# Patient Record
Sex: MALE | Race: WHITE | NOT HISPANIC OR LATINO | Employment: OTHER | URBAN - METROPOLITAN AREA
[De-identification: names, ages, dates, MRNs, and addresses within clinical notes are randomized per-mention and may not be internally consistent; named-entity substitution may affect disease eponyms.]

---

## 2018-01-22 RX ORDER — DEXTROSE 4 G
1 TABLET,CHEWABLE ORAL DAILY
Qty: 1 EACH | Refills: 0 | OUTPATIENT
Start: 2018-01-22 | End: 2019-01-22

## 2018-01-22 NOTE — TELEPHONE ENCOUNTER
----- Message from Kassy Doty sent at 1/22/2018 11:02 AM CST -----  Contact: spouse   Patient would like to request Rx for a diabetic machine and supplies. Please call back  If completed or can not issue rx at [phone].    PT USES:  [facility] [address] [place] [id number]  Phone: [phone] Fax: [phone]      Thanks,  Kassy Doty

## 2018-01-26 ENCOUNTER — OFFICE VISIT (OUTPATIENT)
Dept: INTERNAL MEDICINE | Facility: CLINIC | Age: 78
End: 2018-01-26
Payer: MEDICARE

## 2018-01-26 VITALS
OXYGEN SATURATION: 97 % | SYSTOLIC BLOOD PRESSURE: 140 MMHG | BODY MASS INDEX: 31.54 KG/M2 | RESPIRATION RATE: 16 BRPM | WEIGHT: 225.31 LBS | HEIGHT: 71 IN | TEMPERATURE: 96 F | HEART RATE: 72 BPM | DIASTOLIC BLOOD PRESSURE: 80 MMHG

## 2018-01-26 DIAGNOSIS — E11.59 HYPERTENSION ASSOCIATED WITH DIABETES: ICD-10-CM

## 2018-01-26 DIAGNOSIS — I15.2 HYPERTENSION ASSOCIATED WITH DIABETES: ICD-10-CM

## 2018-01-26 DIAGNOSIS — E78.5 HYPERLIPIDEMIA ASSOCIATED WITH TYPE 2 DIABETES MELLITUS: ICD-10-CM

## 2018-01-26 DIAGNOSIS — E11.69 HYPERLIPIDEMIA ASSOCIATED WITH TYPE 2 DIABETES MELLITUS: ICD-10-CM

## 2018-01-26 DIAGNOSIS — E11.9 DIABETES MELLITUS WITH NO COMPLICATION: Primary | ICD-10-CM

## 2018-01-26 DIAGNOSIS — N18.30 CKD (CHRONIC KIDNEY DISEASE), STAGE III: ICD-10-CM

## 2018-01-26 PROCEDURE — 99213 OFFICE O/P EST LOW 20 MIN: CPT | Mod: S$GLB,,, | Performed by: INTERNAL MEDICINE

## 2018-01-26 PROCEDURE — 99999 PR PBB SHADOW E&M-EST. PATIENT-LVL III: CPT | Mod: PBBFAC,,, | Performed by: INTERNAL MEDICINE

## 2018-01-26 NOTE — PROGRESS NOTES
"HPI:  Patient is a 77-year-old man who I occasionally intermittently see for acute care issues who comes in today for "physical."  [facility] and he tends to continue getting [facility].  He just wanted to make sure the relationship with me is time to be seen or acute care issues.  I told him that his perfectly fine with me.  I also offered that anytime that he wants to make me.  His primary doctor for all of his healthcare issues.  I would be glad to do that.  I told him that having to record faxed really doesn't work very well and if he wants to get [facility] care through the [facility], he should continue to do that.  Patient at this time is doing fine and has no acute care issues.    Current meds have been verified and updated per the EMR  Exam:BP (!) 140/80   Pulse 72   Temp 96.2 °F (35.7 °C)   Resp 16   Ht 5' 11" (1.803 m)   Wt 102.2 kg (225 lb 5 oz)   SpO2 97%   BMI 31.42 kg/m²   Exam deferred        Impression:  Multiple medical problems below, unknown status as he gets [facility]  Patient Active Problem List   Diagnosis    Diabetes mellitus with no complication    Hypertension associated with diabetes    Hyperlipidemia associated with type 2 diabetes mellitus    CKD (chronic kidney disease), stage III    GERD (gastroesophageal reflux disease)    Carotid stenosis       Plan:     Patient will see me on an as-needed basis.    "

## 2018-03-02 ENCOUNTER — PES CALL (OUTPATIENT)
Dept: ADMINISTRATIVE | Facility: CLINIC | Age: 78
End: 2018-03-02

## 2018-08-15 ENCOUNTER — TELEPHONE (OUTPATIENT)
Dept: INTERNAL MEDICINE | Facility: CLINIC | Age: 78
End: 2018-08-15

## 2018-08-15 NOTE — TELEPHONE ENCOUNTER
----- Message from Zenia Roman sent at 8/15/2018  9:39 AM CDT -----  Contact: pt  States he would like to see if Dr Uribe would write a prescription for the Lybra for testing his blood sugar. Please call pt at [phone]. Thank you

## 2019-04-12 ENCOUNTER — HOSPITAL ENCOUNTER (OUTPATIENT)
Dept: RADIOLOGY | Facility: HOSPITAL | Age: 79
Discharge: HOME | End: 2019-04-12
Attending: INTERNAL MEDICINE
Payer: MEDICARE

## 2019-04-12 ENCOUNTER — OFFICE VISIT (OUTPATIENT)
Dept: INTERNAL MEDICINE | Facility: CLINIC | Age: 79
End: 2019-04-12
Payer: MEDICARE

## 2019-04-12 VITALS
BODY MASS INDEX: 29.02 KG/M2 | SYSTOLIC BLOOD PRESSURE: 150 MMHG | HEART RATE: 70 BPM | OXYGEN SATURATION: 95 % | WEIGHT: 207.25 LBS | DIASTOLIC BLOOD PRESSURE: 70 MMHG | TEMPERATURE: 97 F | HEIGHT: 71 IN

## 2019-04-12 DIAGNOSIS — K21.9 GASTROESOPHAGEAL REFLUX DISEASE, ESOPHAGITIS PRESENCE NOT SPECIFIED: ICD-10-CM

## 2019-04-12 DIAGNOSIS — E11.59 HYPERTENSION ASSOCIATED WITH DIABETES: ICD-10-CM

## 2019-04-12 DIAGNOSIS — Z86.73 HISTORY OF CVA (CEREBROVASCULAR ACCIDENT): ICD-10-CM

## 2019-04-12 DIAGNOSIS — E11.69 HYPERLIPIDEMIA ASSOCIATED WITH TYPE 2 DIABETES MELLITUS: ICD-10-CM

## 2019-04-12 DIAGNOSIS — I15.2 HYPERTENSION ASSOCIATED WITH DIABETES: ICD-10-CM

## 2019-04-12 DIAGNOSIS — I65.23 BILATERAL CAROTID ARTERY STENOSIS: ICD-10-CM

## 2019-04-12 DIAGNOSIS — I50.9 CHRONIC CONGESTIVE HEART FAILURE, UNSPECIFIED HEART FAILURE TYPE: ICD-10-CM

## 2019-04-12 DIAGNOSIS — I25.10 CORONARY ARTERY DISEASE, ANGINA PRESENCE UNSPECIFIED, UNSPECIFIED VESSEL OR LESION TYPE, UNSPECIFIED WHETHER NATIVE OR TRANSPLANTED HEART: ICD-10-CM

## 2019-04-12 DIAGNOSIS — E11.9 DIABETES MELLITUS WITH NO COMPLICATION: ICD-10-CM

## 2019-04-12 DIAGNOSIS — E78.5 HYPERLIPIDEMIA ASSOCIATED WITH TYPE 2 DIABETES MELLITUS: ICD-10-CM

## 2019-04-12 DIAGNOSIS — Z00.00 ROUTINE GENERAL MEDICAL EXAMINATION AT A HEALTH CARE FACILITY: Primary | ICD-10-CM

## 2019-04-12 DIAGNOSIS — N18.30 CKD (CHRONIC KIDNEY DISEASE), STAGE III: ICD-10-CM

## 2019-04-12 PROCEDURE — 71046 XR CHEST PA AND LATERAL: ICD-10-PCS | Mod: 26,HCNC,, | Performed by: RADIOLOGY

## 2019-04-12 PROCEDURE — 71046 X-RAY EXAM CHEST 2 VIEWS: CPT | Mod: 26,HCNC,, | Performed by: RADIOLOGY

## 2019-04-12 PROCEDURE — 3077F PR MOST RECENT SYSTOLIC BLOOD PRESSURE >= 140 MM HG: ICD-10-PCS | Mod: HCNC,CPTII,S$GLB, | Performed by: INTERNAL MEDICINE

## 2019-04-12 PROCEDURE — 99999 PR PBB SHADOW E&M-EST. PATIENT-LVL V: CPT | Mod: PBBFAC,HCNC,, | Performed by: INTERNAL MEDICINE

## 2019-04-12 PROCEDURE — 99499 RISK ADDL DX/OHS AUDIT: ICD-10-PCS | Mod: HCNC,S$GLB,, | Performed by: INTERNAL MEDICINE

## 2019-04-12 PROCEDURE — 99397 PR PREVENTIVE VISIT,EST,65 & OVER: ICD-10-PCS | Mod: HCNC,S$GLB,, | Performed by: INTERNAL MEDICINE

## 2019-04-12 PROCEDURE — 3077F SYST BP >= 140 MM HG: CPT | Mod: HCNC,CPTII,S$GLB, | Performed by: INTERNAL MEDICINE

## 2019-04-12 PROCEDURE — 99397 PER PM REEVAL EST PAT 65+ YR: CPT | Mod: HCNC,S$GLB,, | Performed by: INTERNAL MEDICINE

## 2019-04-12 PROCEDURE — 71046 X-RAY EXAM CHEST 2 VIEWS: CPT | Mod: TC,HCNC,PO

## 2019-04-12 PROCEDURE — 3078F PR MOST RECENT DIASTOLIC BLOOD PRESSURE < 80 MM HG: ICD-10-PCS | Mod: HCNC,CPTII,S$GLB, | Performed by: INTERNAL MEDICINE

## 2019-04-12 PROCEDURE — 3078F DIAST BP <80 MM HG: CPT | Mod: HCNC,CPTII,S$GLB, | Performed by: INTERNAL MEDICINE

## 2019-04-12 PROCEDURE — 99499 UNLISTED E&M SERVICE: CPT | Mod: HCNC,S$GLB,, | Performed by: INTERNAL MEDICINE

## 2019-04-12 PROCEDURE — 99999 PR PBB SHADOW E&M-EST. PATIENT-LVL V: ICD-10-PCS | Mod: PBBFAC,HCNC,, | Performed by: INTERNAL MEDICINE

## 2019-04-12 RX ORDER — SERTRALINE HYDROCHLORIDE 50 MG/1
25 TABLET, FILM COATED ORAL
COMMUNITY
End: 2019-05-28

## 2019-04-12 NOTE — PROGRESS NOTES
HPI:  Patient is a 78-year-old man who comes today in order to transfer care.  Patient has seen me a occasionally in the past but primarily [facility].  Patient last October and November was admitted according to the wife 2 times to [facility] for strokes.  I am only able to see 1 admission in the Care everywhere section of epic.  He there is confusion as to exactly were lesion was.  The MRI and CT scan suggested a right parietal lobe infarct.  The consult from the neurologist's states he had a left parietal lobe infarct.  Patient states in both times that he had problems with speech and primarily weakness in his right leg.  The wife states now he is weak all over both legs equally.  He has been known to have bilateral carotid disease. The ultrasound and MRA angiogram suggest he has primarily 50% lesion in the right carotid and nothing was mentioned about the left.  He had a transesophageal echo that revealed no evidence of clot.  That was felt that his stroke was due to embolism.    Patient has had really no change since last November 4 months ago.  He is just wanting all of his care to be transferred to [facility].  Wife mentions that there has been some discussion about him having a stent put in an order for potential dialysis.  The last time I saw him he had stage 3 chronic kidney disease. I suspect the dye he has received last November probably made things worse.    Current MEDS: medcard review, verified and update  Allergies: Per the electronic medical record    Past Medical History:   Diagnosis Date    Carotid stenosis     50% by ultrasound[facility]    CKD (chronic kidney disease), stage III     Coronary artery disease     Diabetes mellitus with no complication     GERD (gastroesophageal reflux disease)     History of CVA (cerebrovascular accident) 11/2018    MRI  reveals right parietal lobe infarct    Hyperlipidemia associated with type  "2 diabetes mellitus     Hypertension associated with diabetes        Past Surgical History:   Procedure Laterality Date    CORONARY ARTERY BYPASS GRAFT      CORONARY STENT PLACEMENT      KIDNEY STONE SURGERY         SHx: per the electronic medical record    FHx: recorded in the electronic medical record    ROS:    denies any chest pains or shortness of breath. Denies any nausea, vomiting or diarrhea. Denies any fever, chills or sweats. Denies any change in weight, voice, stool, skin or hair. Denies any dysuria, dyspepsia or dysphagia. Denies any change in vision, hearing or headaches. Denies any swollen lymph nodes or loss of memory.    PE:  BP (!) 150/70   Pulse 70   Temp 96.5 °F (35.8 °C) (Tympanic)   Ht 5' 11" (1.803 m)   Wt 94 kg (207 lb 3.7 oz)   SpO2 95%   BMI 28.90 kg/m²   Gen: Well-developed, well-nourished, male, in no acute distress, oriented x3.  He is in a wheelchair  HEENT: neck is supple, no adenopathy, carotids 2+ equal with bilateral bruits, thyroid exam normal size without nodules.  CHEST: clear to auscultation and percussion  CVS: regular rate and rhythm without significant murmur, gallop, or rubs  ABD: soft, benign, no rebound no guarding, no distention.  Bowel sounds are normal.     nontender.  No palpable masses.  No organomegaly and no audible bruits.  RECTAL:  Deferred  EXT: no clubbing, cyanosis, he does have bilateral 1 to 2+ edema  LYMPH: no cervical, inguinal, or axillary adenopathy  FEET: no loss of sensation.  No ulcers or pressure sores.  .        Impression:  Numerous medical problems below  Patient Active Problem List   Diagnosis    Diabetes mellitus with no complication    Hypertension associated with diabetes    Hyperlipidemia associated with type 2 diabetes mellitus    CKD (chronic kidney disease), stage III    GERD (gastroesophageal reflux disease)    Carotid stenosis    History of CVA (cerebrovascular accident)       Plan:   Orders Placed This Encounter    US " Carotid Bilateral    X-Ray Chest PA And Lateral    CBC auto differential    Comprehensive metabolic panel    Lipid panel    TSH    Hemoglobin A1c    Protein / creatinine ratio, urine    Ambulatory consult to Nephrology    Ambulatory consult to Cardiology    2D echo with color flow doppler     We will need to establish the database.  He will have above lab work, echo, chest x-ray and consult.  He will have a carotid ultrasound done.  I want to see him back in 3 weeks with all the above.    This note is generated with speech recognition software and is subject to transcription error and sound alike phrases that may be missed by proofreading.

## 2019-04-30 ENCOUNTER — LAB VISIT (OUTPATIENT)
Dept: LAB | Facility: HOSPITAL | Age: 79
End: 2019-04-30
Attending: INTERNAL MEDICINE
Payer: MEDICARE

## 2019-04-30 ENCOUNTER — CLINICAL SUPPORT (OUTPATIENT)
Dept: CARDIOLOGY | Facility: CLINIC | Age: 79
End: 2019-04-30
Attending: INTERNAL MEDICINE
Payer: MEDICARE

## 2019-04-30 ENCOUNTER — TELEPHONE (OUTPATIENT)
Dept: INTERNAL MEDICINE | Facility: CLINIC | Age: 79
End: 2019-04-30

## 2019-04-30 DIAGNOSIS — E11.9 DIABETES MELLITUS WITH NO COMPLICATION: ICD-10-CM

## 2019-04-30 DIAGNOSIS — I50.9 CHRONIC CONGESTIVE HEART FAILURE, UNSPECIFIED HEART FAILURE TYPE: ICD-10-CM

## 2019-04-30 LAB
ALBUMIN SERPL BCP-MCNC: 3.1 G/DL (ref 3.5–5.2)
ALP SERPL-CCNC: 68 U/L (ref 55–135)
ALT SERPL W/O P-5'-P-CCNC: 8 U/L (ref 10–44)
ANION GAP SERPL CALC-SCNC: 10 MMOL/L (ref 8–16)
AORTIC VALVE STENOSIS: ABNORMAL
AST SERPL-CCNC: 11 U/L (ref 10–40)
BASOPHILS # BLD AUTO: 0.04 K/UL (ref 0–0.2)
BASOPHILS NFR BLD: 0.5 % (ref 0–1.9)
BILIRUB SERPL-MCNC: 0.4 MG/DL (ref 0.1–1)
BUN SERPL-MCNC: 22 MG/DL (ref 8–23)
CALCIUM SERPL-MCNC: 9.1 MG/DL (ref 8.7–10.5)
CHLORIDE SERPL-SCNC: 109 MMOL/L (ref 95–110)
CHOLEST SERPL-MCNC: 249 MG/DL (ref 120–199)
CHOLEST/HDLC SERPL: 7.3 {RATIO} (ref 2–5)
CO2 SERPL-SCNC: 22 MMOL/L (ref 23–29)
CREAT SERPL-MCNC: 2.4 MG/DL (ref 0.5–1.4)
DIFFERENTIAL METHOD: ABNORMAL
EOSINOPHIL # BLD AUTO: 0.2 K/UL (ref 0–0.5)
EOSINOPHIL NFR BLD: 2.4 % (ref 0–8)
ERYTHROCYTE [DISTWIDTH] IN BLOOD BY AUTOMATED COUNT: 15.7 % (ref 11.5–14.5)
EST. GFR  (AFRICAN AMERICAN): 28.8 ML/MIN/1.73 M^2
EST. GFR  (NON AFRICAN AMERICAN): 24.9 ML/MIN/1.73 M^2
ESTIMATED AVG GLUCOSE: 206 MG/DL (ref 68–131)
ESTIMATED PA SYSTOLIC PRESSURE: 39.48
GLUCOSE SERPL-MCNC: 140 MG/DL (ref 70–110)
HBA1C MFR BLD HPLC: 8.8 % (ref 4–5.6)
HCT VFR BLD AUTO: 41.3 % (ref 40–54)
HDLC SERPL-MCNC: 34 MG/DL (ref 40–75)
HDLC SERPL: 13.7 % (ref 20–50)
HGB BLD-MCNC: 12.6 G/DL (ref 14–18)
IMM GRANULOCYTES # BLD AUTO: 0.02 K/UL (ref 0–0.04)
IMM GRANULOCYTES NFR BLD AUTO: 0.3 % (ref 0–0.5)
LDLC SERPL CALC-MCNC: 191.4 MG/DL (ref 63–159)
LYMPHOCYTES # BLD AUTO: 0.8 K/UL (ref 1–4.8)
LYMPHOCYTES NFR BLD: 10.4 % (ref 18–48)
MCH RBC QN AUTO: 29.1 PG (ref 27–31)
MCHC RBC AUTO-ENTMCNC: 30.5 G/DL (ref 32–36)
MCV RBC AUTO: 95 FL (ref 82–98)
MITRAL VALVE REGURGITATION: ABNORMAL
MONOCYTES # BLD AUTO: 0.9 K/UL (ref 0.3–1)
MONOCYTES NFR BLD: 11.9 % (ref 4–15)
NEUTROPHILS # BLD AUTO: 5.5 K/UL (ref 1.8–7.7)
NEUTROPHILS NFR BLD: 74.5 % (ref 38–73)
NONHDLC SERPL-MCNC: 215 MG/DL
NRBC BLD-RTO: 0 /100 WBC
PLATELET # BLD AUTO: 262 K/UL (ref 150–350)
PMV BLD AUTO: 10.5 FL (ref 9.2–12.9)
POTASSIUM SERPL-SCNC: 4.6 MMOL/L (ref 3.5–5.1)
PROT SERPL-MCNC: 6.4 G/DL (ref 6–8.4)
RBC # BLD AUTO: 4.33 M/UL (ref 4.6–6.2)
RETIRED EF AND QEF - SEE NOTES: 25 (ref 55–65)
SODIUM SERPL-SCNC: 141 MMOL/L (ref 136–145)
TRICUSPID VALVE REGURGITATION: ABNORMAL
TRIGL SERPL-MCNC: 118 MG/DL (ref 30–150)
TSH SERPL DL<=0.005 MIU/L-ACNC: 1.02 UIU/ML (ref 0.4–4)
WBC # BLD AUTO: 7.42 K/UL (ref 3.9–12.7)

## 2019-04-30 PROCEDURE — 80061 LIPID PANEL: CPT | Mod: HCNC

## 2019-04-30 PROCEDURE — 93306 TTE W/DOPPLER COMPLETE: CPT | Mod: HCNC,S$GLB,, | Performed by: INTERNAL MEDICINE

## 2019-04-30 PROCEDURE — 84443 ASSAY THYROID STIM HORMONE: CPT | Mod: HCNC

## 2019-04-30 PROCEDURE — 83036 HEMOGLOBIN GLYCOSYLATED A1C: CPT | Mod: HCNC

## 2019-04-30 PROCEDURE — 93306 2D ECHO WITH COLOR FLOW DOPPLER: ICD-10-PCS | Mod: HCNC,S$GLB,, | Performed by: INTERNAL MEDICINE

## 2019-04-30 PROCEDURE — 36415 COLL VENOUS BLD VENIPUNCTURE: CPT | Mod: HCNC

## 2019-04-30 PROCEDURE — 80053 COMPREHEN METABOLIC PANEL: CPT | Mod: HCNC

## 2019-04-30 PROCEDURE — 85025 COMPLETE CBC W/AUTO DIFF WBC: CPT | Mod: HCNC

## 2019-05-01 ENCOUNTER — TELEPHONE (OUTPATIENT)
Dept: INTERNAL MEDICINE | Facility: CLINIC | Age: 79
End: 2019-05-01

## 2019-05-10 ENCOUNTER — OFFICE VISIT (OUTPATIENT)
Dept: INTERNAL MEDICINE | Facility: CLINIC | Age: 79
End: 2019-05-10
Payer: MEDICARE

## 2019-05-10 VITALS
SYSTOLIC BLOOD PRESSURE: 142 MMHG | HEIGHT: 71 IN | TEMPERATURE: 98 F | OXYGEN SATURATION: 95 % | BODY MASS INDEX: 29.32 KG/M2 | WEIGHT: 209.44 LBS | HEART RATE: 68 BPM | DIASTOLIC BLOOD PRESSURE: 90 MMHG

## 2019-05-10 DIAGNOSIS — Z79.4 TYPE 2 DIABETES MELLITUS WITH STAGE 4 CHRONIC KIDNEY DISEASE, WITH LONG-TERM CURRENT USE OF INSULIN: ICD-10-CM

## 2019-05-10 DIAGNOSIS — E11.22 TYPE 2 DIABETES MELLITUS WITH STAGE 4 CHRONIC KIDNEY DISEASE, WITH LONG-TERM CURRENT USE OF INSULIN: ICD-10-CM

## 2019-05-10 DIAGNOSIS — I50.22: ICD-10-CM

## 2019-05-10 DIAGNOSIS — N18.4 TYPE 2 DIABETES MELLITUS WITH STAGE 4 CHRONIC KIDNEY DISEASE, WITH LONG-TERM CURRENT USE OF INSULIN: ICD-10-CM

## 2019-05-10 DIAGNOSIS — N18.4 CHRONIC KIDNEY DISEASE, STAGE IV (SEVERE): ICD-10-CM

## 2019-05-10 DIAGNOSIS — I35.1 MILD AI (AORTIC INSUFFICIENCY): ICD-10-CM

## 2019-05-10 DIAGNOSIS — I34.0 MODERATE MITRAL REGURGITATION: ICD-10-CM

## 2019-05-10 PROCEDURE — 99214 PR OFFICE/OUTPT VISIT, EST, LEVL IV, 30-39 MIN: ICD-10-PCS | Mod: HCNC,S$GLB,, | Performed by: INTERNAL MEDICINE

## 2019-05-10 PROCEDURE — 3080F PR MOST RECENT DIASTOLIC BLOOD PRESSURE >= 90 MM HG: ICD-10-PCS | Mod: HCNC,CPTII,S$GLB, | Performed by: INTERNAL MEDICINE

## 2019-05-10 PROCEDURE — 1101F PT FALLS ASSESS-DOCD LE1/YR: CPT | Mod: HCNC,CPTII,S$GLB, | Performed by: INTERNAL MEDICINE

## 2019-05-10 PROCEDURE — 99214 OFFICE O/P EST MOD 30 MIN: CPT | Mod: HCNC,S$GLB,, | Performed by: INTERNAL MEDICINE

## 2019-05-10 PROCEDURE — 1101F PR PT FALLS ASSESS DOC 0-1 FALLS W/OUT INJ PAST YR: ICD-10-PCS | Mod: HCNC,CPTII,S$GLB, | Performed by: INTERNAL MEDICINE

## 2019-05-10 PROCEDURE — 3080F DIAST BP >= 90 MM HG: CPT | Mod: HCNC,CPTII,S$GLB, | Performed by: INTERNAL MEDICINE

## 2019-05-10 PROCEDURE — 99999 PR PBB SHADOW E&M-EST. PATIENT-LVL III: ICD-10-PCS | Mod: PBBFAC,HCNC,, | Performed by: INTERNAL MEDICINE

## 2019-05-10 PROCEDURE — 3077F SYST BP >= 140 MM HG: CPT | Mod: HCNC,CPTII,S$GLB, | Performed by: INTERNAL MEDICINE

## 2019-05-10 PROCEDURE — 99999 PR PBB SHADOW E&M-EST. PATIENT-LVL III: CPT | Mod: PBBFAC,HCNC,, | Performed by: INTERNAL MEDICINE

## 2019-05-10 PROCEDURE — 3077F PR MOST RECENT SYSTOLIC BLOOD PRESSURE >= 140 MM HG: ICD-10-PCS | Mod: HCNC,CPTII,S$GLB, | Performed by: INTERNAL MEDICINE

## 2019-05-10 RX ORDER — ROSUVASTATIN CALCIUM 40 MG/1
40 TABLET, COATED ORAL NIGHTLY
Qty: 90 TABLET | Refills: 3 | Status: ON HOLD | OUTPATIENT
Start: 2019-05-10 | End: 2019-08-06 | Stop reason: HOSPADM

## 2019-05-10 RX ORDER — HYDRALAZINE HYDROCHLORIDE 100 MG/1
100 TABLET, FILM COATED ORAL 2 TIMES DAILY
Qty: 180 TABLET | Refills: 3 | Status: SHIPPED | OUTPATIENT
Start: 2019-05-10

## 2019-05-10 RX ORDER — GLIPIZIDE 10 MG/1
10 TABLET ORAL
Qty: 180 TABLET | Refills: 3 | Status: ON HOLD | OUTPATIENT
Start: 2019-05-10 | End: 2019-05-28

## 2019-05-10 NOTE — PROGRESS NOTES
"HPI:  Patient is a 78-year-old man who comes today for follow-up of his lab work and studies that have been recently done.  Patient was seen for the 1st time about 2 weeks ago.  I refer you to that note.  There has been no interval change in his status.    Current meds have been verified and updated per the EMR  Exam:BP (!) 142/90   Pulse 68   Temp 98.2 °F (36.8 °C) (Tympanic)   Ht 5' 11" (1.803 m)   Wt 95 kg (209 lb 7 oz)   SpO2 95%   BMI 29.21 kg/m²   Exam deferred    Lab Results   Component Value Date    WBC 7.42 04/30/2019    HGB 12.6 (L) 04/30/2019    HCT 41.3 04/30/2019     04/30/2019    CHOL 249 (H) 04/30/2019    TRIG 118 04/30/2019    HDL 34 (L) 04/30/2019    ALT 8 (L) 04/30/2019    AST 11 04/30/2019     04/30/2019    K 4.6 04/30/2019     04/30/2019    CREATININE 2.4 (H) 04/30/2019    BUN 22 04/30/2019    CO2 22 (L) 04/30/2019    TSH 1.017 04/30/2019    HGBA1C 8.8 (H) 04/30/2019    Echo shows EF of 25-30%.  He had some mild aortic insufficiency and moderate mitral regurgitation.    Impression:  Hypertension, not to goal  Diabetes, not to goal  Chronic kidney disease, stage IV with elevated the urine for protein creatinine ratio  Congestive heart failure  Patient Active Problem List   Diagnosis    Diabetes mellitus with no complication    Hypertension associated with diabetes    Hyperlipidemia associated with type 2 diabetes mellitus    GERD (gastroesophageal reflux disease)    Carotid stenosis    History of CVA (cerebrovascular accident)    Moderate mitral regurgitation    Mild AI (aortic insufficiency)    Congestive heart failure, NYHA class 3, chronic, systolic    Chronic kidney disease, stage IV (severe)    Type II diabetes mellitus with renal manifestations       Plan:  Orders Placed This Encounter    Hemoglobin A1c    Comprehensive metabolic panel    Lipid panel    Brain natriuretic peptide    rosuvastatin (CRESTOR) 40 MG Tab    glipiZIDE (GLUCOTROL) 10 MG " tablet    hydrALAZINE (APRESOLINE) 100 MG tablet    Patient will increase his hydralazine to 100 mg twice a day.  He will start taking the Crestor.  He was started on glipizide 10 mg twice a day.  Needs to get an appointment see both Cardiology and Nephrology.  He will see me again in 3 months with above lab work.      This note is generated with speech recognition software and is subject to transcription error and sound alike phrases that may be missed by proofreading.

## 2019-05-11 ENCOUNTER — HOSPITAL ENCOUNTER (EMERGENCY)
Facility: HOSPITAL | Age: 79
Discharge: HOME | End: 2019-05-11
Attending: EMERGENCY MEDICINE
Payer: MEDICARE

## 2019-05-11 VITALS
TEMPERATURE: 98 F | DIASTOLIC BLOOD PRESSURE: 80 MMHG | RESPIRATION RATE: 18 BRPM | WEIGHT: 213.13 LBS | OXYGEN SATURATION: 95 % | SYSTOLIC BLOOD PRESSURE: 167 MMHG | BODY MASS INDEX: 29.72 KG/M2 | HEART RATE: 77 BPM

## 2019-05-11 DIAGNOSIS — R19.7 NAUSEA VOMITING AND DIARRHEA: Primary | ICD-10-CM

## 2019-05-11 DIAGNOSIS — R11.2 NAUSEA VOMITING AND DIARRHEA: Primary | ICD-10-CM

## 2019-05-11 DIAGNOSIS — M79.632 LEFT FOREARM PAIN: ICD-10-CM

## 2019-05-11 DIAGNOSIS — N28.9 RENAL INSUFFICIENCY, MILD: ICD-10-CM

## 2019-05-11 DIAGNOSIS — M79.603 ARM PAIN: ICD-10-CM

## 2019-05-11 DIAGNOSIS — R06.02 SOB (SHORTNESS OF BREATH): ICD-10-CM

## 2019-05-11 LAB
ALBUMIN SERPL BCP-MCNC: 3 G/DL (ref 3.5–5.2)
ALP SERPL-CCNC: 65 U/L (ref 55–135)
ALT SERPL W/O P-5'-P-CCNC: 5 U/L (ref 10–44)
ANION GAP SERPL CALC-SCNC: 8 MMOL/L (ref 8–16)
APTT BLDCRRT: 26.2 SEC (ref 21–32)
AST SERPL-CCNC: 10 U/L (ref 10–40)
BACTERIA #/AREA URNS HPF: NORMAL /HPF
BASOPHILS # BLD AUTO: 0.01 K/UL (ref 0–0.2)
BASOPHILS NFR BLD: 0.1 % (ref 0–1.9)
BILIRUB SERPL-MCNC: 0.5 MG/DL (ref 0.1–1)
BILIRUB UR QL STRIP: NEGATIVE
BNP SERPL-MCNC: 1809 PG/ML (ref 0–99)
BUN SERPL-MCNC: 23 MG/DL (ref 8–23)
CALCIUM SERPL-MCNC: 8.6 MG/DL (ref 8.7–10.5)
CHLORIDE SERPL-SCNC: 109 MMOL/L (ref 95–110)
CLARITY UR: CLEAR
CO2 SERPL-SCNC: 21 MMOL/L (ref 23–29)
COLOR UR: YELLOW
CREAT SERPL-MCNC: 2.3 MG/DL (ref 0.5–1.4)
DIFFERENTIAL METHOD: ABNORMAL
EOSINOPHIL # BLD AUTO: 0 K/UL (ref 0–0.5)
EOSINOPHIL NFR BLD: 0.1 % (ref 0–8)
ERYTHROCYTE [DISTWIDTH] IN BLOOD BY AUTOMATED COUNT: 16.1 % (ref 11.5–14.5)
EST. GFR  (AFRICAN AMERICAN): 30 ML/MIN/1.73 M^2
EST. GFR  (NON AFRICAN AMERICAN): 26 ML/MIN/1.73 M^2
GLUCOSE SERPL-MCNC: 90 MG/DL (ref 70–110)
GLUCOSE UR QL STRIP: ABNORMAL
HCT VFR BLD AUTO: 37.8 % (ref 40–54)
HGB BLD-MCNC: 12.3 G/DL (ref 14–18)
HGB UR QL STRIP: NEGATIVE
HYALINE CASTS #/AREA URNS LPF: 0 /LPF
INR PPP: 0.9 (ref 0.8–1.2)
KETONES UR QL STRIP: NEGATIVE
LEUKOCYTE ESTERASE UR QL STRIP: NEGATIVE
LYMPHOCYTES # BLD AUTO: 0.5 K/UL (ref 1–4.8)
LYMPHOCYTES NFR BLD: 5.2 % (ref 18–48)
MCH RBC QN AUTO: 29.1 PG (ref 27–31)
MCHC RBC AUTO-ENTMCNC: 32.5 G/DL (ref 32–36)
MCV RBC AUTO: 90 FL (ref 82–98)
MICROSCOPIC COMMENT: NORMAL
MONOCYTES # BLD AUTO: 0.5 K/UL (ref 0.3–1)
MONOCYTES NFR BLD: 4.4 % (ref 4–15)
NEUTROPHILS # BLD AUTO: 9.3 K/UL (ref 1.8–7.7)
NEUTROPHILS NFR BLD: 90.2 % (ref 38–73)
NITRITE UR QL STRIP: NEGATIVE
PH UR STRIP: 6 [PH] (ref 5–8)
PLATELET # BLD AUTO: 190 K/UL (ref 150–350)
PMV BLD AUTO: 10.1 FL (ref 9.2–12.9)
POCT GLUCOSE: 123 MG/DL (ref 70–110)
POCT GLUCOSE: 92 MG/DL (ref 70–110)
POTASSIUM SERPL-SCNC: 3.5 MMOL/L (ref 3.5–5.1)
PROT SERPL-MCNC: 6.3 G/DL (ref 6–8.4)
PROT UR QL STRIP: ABNORMAL
PROTHROMBIN TIME: 10 SEC (ref 9–12.5)
RBC # BLD AUTO: 4.22 M/UL (ref 4.6–6.2)
RBC #/AREA URNS HPF: 0 /HPF (ref 0–4)
SODIUM SERPL-SCNC: 138 MMOL/L (ref 136–145)
SP GR UR STRIP: >=1.03 (ref 1–1.03)
TROPONIN I SERPL DL<=0.01 NG/ML-MCNC: 0.05 NG/ML (ref 0–0.03)
URN SPEC COLLECT METH UR: ABNORMAL
UROBILINOGEN UR STRIP-ACNC: NEGATIVE EU/DL
WBC # BLD AUTO: 10.26 K/UL (ref 3.9–12.7)
WBC #/AREA URNS HPF: 0 /HPF (ref 0–5)

## 2019-05-11 PROCEDURE — 83880 ASSAY OF NATRIURETIC PEPTIDE: CPT | Mod: HCNC

## 2019-05-11 PROCEDURE — 25000003 PHARM REV CODE 250: Mod: HCNC | Performed by: EMERGENCY MEDICINE

## 2019-05-11 PROCEDURE — 84484 ASSAY OF TROPONIN QUANT: CPT | Mod: HCNC

## 2019-05-11 PROCEDURE — 81000 URINALYSIS NONAUTO W/SCOPE: CPT | Mod: HCNC

## 2019-05-11 PROCEDURE — 85025 COMPLETE CBC W/AUTO DIFF WBC: CPT | Mod: HCNC

## 2019-05-11 PROCEDURE — 82962 GLUCOSE BLOOD TEST: CPT | Mod: HCNC

## 2019-05-11 PROCEDURE — 85730 THROMBOPLASTIN TIME PARTIAL: CPT | Mod: HCNC

## 2019-05-11 PROCEDURE — 99285 EMERGENCY DEPT VISIT HI MDM: CPT | Mod: 25,HCNC

## 2019-05-11 PROCEDURE — 85610 PROTHROMBIN TIME: CPT | Mod: HCNC

## 2019-05-11 PROCEDURE — 80053 COMPREHEN METABOLIC PANEL: CPT | Mod: HCNC

## 2019-05-11 PROCEDURE — 96360 HYDRATION IV INFUSION INIT: CPT | Mod: HCNC

## 2019-05-11 PROCEDURE — 36415 COLL VENOUS BLD VENIPUNCTURE: CPT | Mod: HCNC

## 2019-05-11 PROCEDURE — 96361 HYDRATE IV INFUSION ADD-ON: CPT | Mod: HCNC

## 2019-05-11 RX ORDER — ONDANSETRON 4 MG/1
4 TABLET, ORALLY DISINTEGRATING ORAL EVERY 8 HOURS PRN
Qty: 5 TABLET | Refills: 0 | Status: SHIPPED | OUTPATIENT
Start: 2019-05-11

## 2019-05-11 RX ORDER — LOPERAMIDE HYDROCHLORIDE 2 MG/1
2 CAPSULE ORAL 4 TIMES DAILY PRN
Qty: 12 CAPSULE | Refills: 0 | Status: SHIPPED | OUTPATIENT
Start: 2019-05-11 | End: 2019-05-21

## 2019-05-11 RX ADMIN — SODIUM CHLORIDE 500 ML: 0.9 INJECTION, SOLUTION INTRAVENOUS at 02:05

## 2019-05-11 NOTE — ED NOTES
MD aware of patient's blood pressure, states to discharge patient.    Discharge instructions explained to patient and wife. Wife verbalizes understanding. Patient and discharge paperwork escorted to registration desk by RN at this time. Discharge paperwork given to registration for completion of discharge.

## 2019-05-11 NOTE — ED PROVIDER NOTES
SCRIBE #1 NOTE: I, Charleen Tineo, am scribing for, and in the presence of, Mahendra Tineo Jr., MD. I have scribed the entire note.       History     Chief Complaint   Patient presents with    Diarrhea     patient has a c/o of having diarrhea    Hypoglycemia     cbg 58 upon [facility] arrival. CBG 99 after d5w     Review of patient's allergies indicates:   Allergen Reactions    Influenza virus vaccines Other (See Comments)     Red streaks up arm    Penicillins Swelling         History of Present Illness     HPI    5/11/2019, 1:48 PM  History obtained from the patient      History of Present Illness: Carlitos Ellington is a 78 y.o. male patient with a PMHx of HTN, DM, CHF who presents to the Emergency Department for evaluation of diarrhea which onset suddenly a few days ago. He has had excessive amounts of diarrhea. Sxs are episodic and moderate in severity. No mitigating or exacerbating factor reported. Patient reports that he fell yesterday and has L arm pain s/p the fall. He denies any head trauma. Patient was hypoglycemic (58) but his CBG increased to 99 after d50. Patient did not eat breakfast and did not take his insulin this morning. He denies any fever, chills, CP, SOB, palpitations, blood in stool, dizziness, lightheadedness, syncope, and all other sxs at this time.      Arrival mode: AASI    PCP: Raza Uribe MD        Past Medical History:  Past Medical History:   Diagnosis Date    Carotid stenosis     50% [facility]    Chronic kidney disease, stage IV (severe)     Congestive heart failure, NYHA class 3, chronic, systolic     Coronary artery disease     Diabetes mellitus with no complication     GERD (gastroesophageal reflux disease)     History of CVA (cerebrovascular accident) 11/2018    MRI  reveals right parietal lobe infarct    Hyperlipidemia associated with type 2 diabetes mellitus     Hypertension associated with diabetes     Mild AI (aortic insufficiency)      Moderate mitral regurgitation     Type II diabetes mellitus with renal manifestations        Past Surgical History:  Past Surgical History:   Procedure Laterality Date    CORONARY ARTERY BYPASS GRAFT      CORONARY STENT PLACEMENT      KIDNEY STONE SURGERY           Family History:  History reviewed. No pertinent family history.    Social History:  Social History     Tobacco Use    Smoking status: Former Smoker    Smokeless tobacco: Current User     Types: Snuff   Substance and Sexual Activity    Alcohol use: No    Drug use: No    Sexual activity: Unknown        Review of Systems     Review of Systems   Constitutional: Negative for chills and fever.   HENT: Negative for sore throat.    Respiratory: Negative for cough and shortness of breath.    Cardiovascular: Negative for chest pain, palpitations and leg swelling.   Gastrointestinal: Positive for diarrhea. Negative for abdominal distention, abdominal pain, anal bleeding, blood in stool, constipation and vomiting.   Genitourinary: Negative for dysuria.   Musculoskeletal: Negative for back pain.        (+) L arm pain s/p fall, (-) head trauma   Skin: Negative for rash.   Neurological: Negative for dizziness, syncope, weakness and light-headedness.   Hematological: Does not bruise/bleed easily.   All other systems reviewed and are negative.       Physical Exam     Initial Vitals [05/11/19 1347]   BP Pulse Resp Temp SpO2   (!) 155/74 80 (!) 118 99.9 °F (37.7 °C) 97 %      MAP       --          Physical Exam  Nursing Notes and Vital Signs Reviewed.  Constitutional: Patient is in no acute distress. Elderly. Well-nourished.  Head: Atraumatic. Normocephalic.  Eyes: PERRL. EOM intact. Conjunctivae are not pale. No scleral icterus.  ENT: Mucous membranes are moist. Oropharynx is clear and symmetric.    Neck: Supple. Full ROM. No lymphadenopathy.  Cardiovascular: Regular rate. Regular rhythm. No murmurs, rubs, or gallops. Distal pulses are 2+ and  symmetric.  Pulmonary/Chest: No respiratory distress. Clear to auscultation bilaterally. No wheezing or rales.  Abdominal: Soft. Abd is bloated. There is no tenderness.  No rebound, guarding, or rigidity. Good bowel sounds.  Musculoskeletal: Moves all extremities. No obvious deformities. No edema. No calf tenderness.  Bilateral elbows: Bilateral elbow pain. has no evident deformity. Negative for swelling. ROM is normal. Cap refill distally is <2 seconds. Radial pulses are equal and 2+ bilaterally. No motor deficit. No distal sensory deficit.  Skin: Warm and dry.  Neurological:  Alert, awake, and appropriate.  Normal speech.  No acute focal neurological deficits are appreciated.  Psychiatric: Normal affect. Good eye contact. Appropriate in content.     ED Course   Procedures  ED Vital Signs:  Vitals:    05/11/19 1347 05/11/19 1415 05/11/19 1416 05/11/19 1430   BP: (!) 155/74 (!) 173/72     Pulse: 80  75 72   Resp: (!) 118   17   Temp: 99.9 °F (37.7 °C)      TempSrc: Oral      SpO2: 97%   96%   Weight:        05/11/19 1515 05/11/19 1517   BP: (!) 188/84    Pulse: 83    Resp: 15    Temp:     TempSrc:     SpO2: 97%    Weight:  96.7 kg (213 lb 1.6 oz)       Abnormal Lab Results:  Labs Reviewed   CBC W/ AUTO DIFFERENTIAL - Abnormal; Notable for the following components:       Result Value    RBC 4.22 (*)     Hemoglobin 12.3 (*)     Hematocrit 37.8 (*)     RDW 16.1 (*)     Gran # (ANC) 9.3 (*)     Lymph # 0.5 (*)     Gran% 90.2 (*)     Lymph% 5.2 (*)     All other components within normal limits   COMPREHENSIVE METABOLIC PANEL - Abnormal; Notable for the following components:    CO2 21 (*)     Creatinine 2.3 (*)     Calcium 8.6 (*)     Albumin 3.0 (*)     ALT 5 (*)     eGFR if  30 (*)     eGFR if non  26 (*)     All other components within normal limits   B-TYPE NATRIURETIC PEPTIDE - Abnormal; Notable for the following components:    BNP 1,809 (*)     All other components within normal  limits   POCT GLUCOSE - Abnormal; Notable for the following components:    POCT Glucose 123 (*)     All other components within normal limits   PROTIME-INR   APTT   TROPONIN I   URINALYSIS   POCT GLUCOSE   POCT GLUCOSE MONITORING CONTINUOUS   POCT GLUCOSE MONITORING CONTINUOUS        All Lab Results:  Results for orders placed or performed during the hospital encounter of 05/11/19   CBC auto differential   Result Value Ref Range    WBC 10.26 3.90 - 12.70 K/uL    RBC 4.22 (L) 4.60 - 6.20 M/uL    Hemoglobin 12.3 (L) 14.0 - 18.0 g/dL    Hematocrit 37.8 (L) 40.0 - 54.0 %    Mean Corpuscular Volume 90 82 - 98 fL    Mean Corpuscular Hemoglobin 29.1 27.0 - 31.0 pg    Mean Corpuscular Hemoglobin Conc 32.5 32.0 - 36.0 g/dL    RDW 16.1 (H) 11.5 - 14.5 %    Platelets 190 150 - 350 K/uL    MPV 10.1 9.2 - 12.9 fL    Gran # (ANC) 9.3 (H) 1.8 - 7.7 K/uL    Lymph # 0.5 (L) 1.0 - 4.8 K/uL    Mono # 0.5 0.3 - 1.0 K/uL    Eos # 0.0 0.0 - 0.5 K/uL    Baso # 0.01 0.00 - 0.20 K/uL    Gran% 90.2 (H) 38.0 - 73.0 %    Lymph% 5.2 (L) 18.0 - 48.0 %    Mono% 4.4 4.0 - 15.0 %    Eosinophil% 0.1 0.0 - 8.0 %    Basophil% 0.1 0.0 - 1.9 %    Differential Method Automated    Comprehensive metabolic panel   Result Value Ref Range    Sodium 138 136 - 145 mmol/L    Potassium 3.5 3.5 - 5.1 mmol/L    Chloride 109 95 - 110 mmol/L    CO2 21 (L) 23 - 29 mmol/L    Glucose 90 70 - 110 mg/dL    BUN, Bld 23 8 - 23 mg/dL    Creatinine 2.3 (H) 0.5 - 1.4 mg/dL    Calcium 8.6 (L) 8.7 - 10.5 mg/dL    Total Protein 6.3 6.0 - 8.4 g/dL    Albumin 3.0 (L) 3.5 - 5.2 g/dL    Total Bilirubin 0.5 0.1 - 1.0 mg/dL    Alkaline Phosphatase 65 55 - 135 U/L    AST 10 10 - 40 U/L    ALT 5 (L) 10 - 44 U/L    Anion Gap 8 8 - 16 mmol/L    eGFR if African American 30 (A) >60 mL/min/1.73 m^2    eGFR if non African American 26 (A) >60 mL/min/1.73 m^2   Brain natriuretic peptide   Result Value Ref Range    BNP 1,809 (H) 0 - 99 pg/mL   POCT glucose   Result Value Ref Range    POCT Glucose  92 70 - 110 mg/dL   POCT glucose   Result Value Ref Range    POCT Glucose 123 (H) 70 - 110 mg/dL       Imaging Results:  Imaging Results          X-Ray Elbow 2 View Bilateral (Final result)  Result time 05/11/19 15:14:36   Procedure changed from X-Ray Humerus 2 View Left     Final result by Elmer Dhillon MD (05/11/19 15:14:36)                 Impression:      No evidence of fracture or dislocation.      Electronically signed by: Elmer Dhillon  Date:    05/11/2019  Time:    15:14             Narrative:    EXAMINATION:  XR ELBOW 2 VIEW BILATERAL    CLINICAL HISTORY:  arm pain ;  Pain in arm, unspecified    TECHNIQUE:  Four views of the bilateral elbows    COMPARISON:  None    FINDINGS:  Left elbow:    No evidence of fracture or dislocation.  Atherosclerotic calcifications noted.    Right elbow:    No evidence of fracture or dislocation.  Atherosclerotic calcifications noted.    Questionable mild olecranon bursal enlargement.  Please correlate for bursitis.                               X-Ray Chest AP Portable (Final result)  Result time 05/11/19 15:08:38    Final result by Elmer Dhillon MD (05/11/19 15:08:38)                 Impression:      Pulmonary vascular congestion with trace pulmonary edema. Unchanged cardiomegaly. No appreciable pleural effusions.      Electronically signed by: Elmer Dhillon  Date:    05/11/2019  Time:    15:08             Narrative:    EXAMINATION:  XR CHEST AP PORTABLE    CLINICAL HISTORY:  . Shortness of breath    TECHNIQUE:  Single frontal portable view of the chest was performed.    COMPARISON:  04/12/2019    FINDINGS:  Support devices: Telemetry leads    Pulmonary vascular congestion with trace pulmonary edema.  Unchanged cardiomegaly.  No appreciable pleural effusions.  Median sternotomy wires intact.    Bones are intact.                               CT Head Without Contrast (Final result)  Result time 05/11/19 14:53:06    Final result by Elmer Dhillon MD (05/11/19 14:53:06)                  Impression:      No acute abnormality.    All CT scans at this facility use dose modulation, iterative reconstruction, and/or weight based dosing when appropriate to reduce radiation dose to as low as reasonably achievable.      Electronically signed by: Elmer Dhillon  Date:    05/11/2019  Time:    14:53             Narrative:    EXAMINATION:  CT HEAD WITHOUT CONTRAST    CLINICAL HISTORY:  Headache, post trauma;    TECHNIQUE:  Low dose axial CT images obtained throughout the head without intravenous contrast. Sagittal and coronal reconstructions were performed.    COMPARISON:  None.    FINDINGS:  Intracranial compartment:    Bilateral moderate severity periventricular white matter hypoattenuation as can be seen with chronic microangiopathic small-vessel disease. Sulcal widening and moderate enlargement of ventricles suggesting age-related white matter volume loss. No extra-axial blood or fluid collections.    5 cm region of encephalomalacia right posterior parietal lobe, and left occipital lobe related to a remote infarcts.  Atherosclerotic calcification noted.  No parenchymal mass, hemorrhage, edema or acute major vascular distribution infarct.    Skull/extracranial contents (limited evaluation): No fracture. Mastoid air cells and paranasal sinuses are essentially clear.                                      ED Discussion   3:57 PM: Discussed with pt all pertinent ED information and results. Discussed pt dx and plan of tx. Gave pt all f/u and return to the ED instructions. All questions and concerns were addressed at this time. Pt expresses understanding of information and instructions, and is comfortable with plan to discharge. Pt is stable for discharge.    I discussed with patient and/or family/caretaker that evaluation in the ED does not suggest any emergent or life threatening medical conditions requiring immediate intervention beyond what was provided in the ED, and I believe patient is safe for  discharge.  Regardless, an unremarkable evaluation in the ED does not preclude the development or presence of a serious of life threatening condition. As such, patient was instructed to return immediately for any worsening or change in current symptoms.     ED Medication(s):  Medications   sodium chloride 0.9% bolus 500 mL (0 mLs Intravenous Stopped 5/11/19 1553)       New Prescriptions    LOPERAMIDE (IMODIUM) 2 MG CAPSULE    Take 1 capsule (2 mg total) by mouth 4 (four) times daily as needed for Diarrhea.    ONDANSETRON (ZOFRAN-ODT) 4 MG TBDL    Take 1 tablet (4 mg total) by mouth every 8 (eight) hours as needed (prn nausea/vomiting).       Follow-up Information     Raza Uribe MD. Call in 2 days.    Specialty:  Internal Medicine  Why:  to schedule appt for recheck espeially if your symptoms have not resolved  Contact information:  [address]  [address]  [facility] [address]  [phone]             [facility]    Specialty:  Emergency Medicine  Why:  If symptoms worsen  Contact information:  [address]  [facility] [address] [phone]                       Medical Decision Making:   Clinical Tests:   Lab Tests: Ordered and Reviewed  Radiological Study: Ordered and Reviewed             Scribe Attestation:   Scribe #1: I performed the above scribed service and the documentation accurately describes the services I performed. I attest to the accuracy of the note.     Attending:   Physician Attestation Statement for Scribe #1: I, Mahendra Tineo Jr., MD, personally performed the services described in this documentation, as scribed by Charleen Tineo, in my presence, and it is both accurate and complete.           Clinical Impression       ICD-10-CM ICD-9-CM   1. Nausea vomiting and diarrhea R11.2 787.91    R19.7 787.01   2. Arm pain M79.603 729.5   3. Left forearm pain M79.632 729.5   4. SOB (shortness of breath) R06.02 786.05   5. Renal insufficiency, mild N28.9  593.9       Disposition:   Disposition: Discharged  Condition: Stable         Mahendra Tineo Jr., MD  05/11/19 1755       Mahendra Tineo Jr., MD  05/11/19 1829

## 2019-05-28 ENCOUNTER — HOSPITAL ENCOUNTER (INPATIENT)
Facility: HOSPITAL | Age: 79
LOS: 2 days | Discharge: HOME | DRG: 291 | End: 2019-05-30
Attending: EMERGENCY MEDICINE | Admitting: INTERNAL MEDICINE
Payer: MEDICARE

## 2019-05-28 ENCOUNTER — TELEPHONE (OUTPATIENT)
Dept: NEPHROLOGY | Facility: CLINIC | Age: 79
End: 2019-05-28

## 2019-05-28 DIAGNOSIS — E16.2 HYPOGLYCEMIA: Primary | ICD-10-CM

## 2019-05-28 DIAGNOSIS — I50.9 ACUTE ON CHRONIC CONGESTIVE HEART FAILURE, UNSPECIFIED HEART FAILURE TYPE: ICD-10-CM

## 2019-05-28 DIAGNOSIS — I50.9 ACC/AHA STAGE C CONGESTIVE HEART FAILURE DUE TO ISCHEMIC CARDIOMYOPATHY: ICD-10-CM

## 2019-05-28 DIAGNOSIS — N18.4 CHRONIC KIDNEY DISEASE, STAGE IV (SEVERE): ICD-10-CM

## 2019-05-28 DIAGNOSIS — J96.01 ACUTE HYPOXEMIC RESPIRATORY FAILURE: ICD-10-CM

## 2019-05-28 DIAGNOSIS — N18.4 CHRONIC KIDNEY DISEASE, STAGE IV (SEVERE): Primary | ICD-10-CM

## 2019-05-28 DIAGNOSIS — I50.9 CHF (CONGESTIVE HEART FAILURE): ICD-10-CM

## 2019-05-28 DIAGNOSIS — J96.11 CHRONIC HYPOXEMIC RESPIRATORY FAILURE: ICD-10-CM

## 2019-05-28 DIAGNOSIS — I25.5 ACC/AHA STAGE C CONGESTIVE HEART FAILURE DUE TO ISCHEMIC CARDIOMYOPATHY: ICD-10-CM

## 2019-05-28 DIAGNOSIS — R06.02 SOB (SHORTNESS OF BREATH): ICD-10-CM

## 2019-05-28 DIAGNOSIS — I50.43 ACUTE ON CHRONIC COMBINED SYSTOLIC AND DIASTOLIC HEART FAILURE: ICD-10-CM

## 2019-05-28 PROBLEM — D64.9 ANEMIA, UNSPECIFIED: Status: ACTIVE | Noted: 2019-05-28

## 2019-05-28 PROBLEM — D63.1 ANEMIA DUE TO STAGE 5 CHRONIC KIDNEY DISEASE, NOT ON CHRONIC DIALYSIS: Chronic | Status: ACTIVE | Noted: 2019-05-28

## 2019-05-28 PROBLEM — R94.31 ABNORMAL ECG: Status: ACTIVE | Noted: 2019-05-28

## 2019-05-28 PROBLEM — Z95.1 HX OF CABG: Status: ACTIVE | Noted: 2019-05-28

## 2019-05-28 PROBLEM — I25.10 CAD, MULTIPLE VESSEL: Status: ACTIVE | Noted: 2019-05-28

## 2019-05-28 PROBLEM — N18.5 ANEMIA DUE TO STAGE 5 CHRONIC KIDNEY DISEASE, NOT ON CHRONIC DIALYSIS: Chronic | Status: ACTIVE | Noted: 2019-05-28

## 2019-05-28 PROBLEM — R79.89 ELEVATED TROPONIN: Status: ACTIVE | Noted: 2019-05-28

## 2019-05-28 LAB
ALBUMIN SERPL BCP-MCNC: 3.2 G/DL (ref 3.5–5.2)
ALLENS TEST: ABNORMAL
ALP SERPL-CCNC: 78 U/L (ref 55–135)
ALT SERPL W/O P-5'-P-CCNC: 13 U/L (ref 10–44)
ANION GAP SERPL CALC-SCNC: 10 MMOL/L (ref 8–16)
AORTIC VALVE STENOSIS: ABNORMAL
AST SERPL-CCNC: 11 U/L (ref 10–40)
BASOPHILS # BLD AUTO: 0.01 K/UL (ref 0–0.2)
BASOPHILS NFR BLD: 0.1 % (ref 0–1.9)
BILIRUB SERPL-MCNC: 0.4 MG/DL (ref 0.1–1)
BNP SERPL-MCNC: 1406 PG/ML (ref 0–99)
BUN SERPL-MCNC: 22 MG/DL (ref 8–23)
CALCIUM SERPL-MCNC: 9 MG/DL (ref 8.7–10.5)
CHLORIDE SERPL-SCNC: 111 MMOL/L (ref 95–110)
CO2 SERPL-SCNC: 23 MMOL/L (ref 23–29)
CREAT SERPL-MCNC: 2.7 MG/DL (ref 0.5–1.4)
DELSYS: ABNORMAL
DIFFERENTIAL METHOD: ABNORMAL
EOSINOPHIL # BLD AUTO: 0.1 K/UL (ref 0–0.5)
EOSINOPHIL NFR BLD: 1.4 % (ref 0–8)
EP: 6
ERYTHROCYTE [DISTWIDTH] IN BLOOD BY AUTOMATED COUNT: 15.9 % (ref 11.5–14.5)
ERYTHROCYTE [SEDIMENTATION RATE] IN BLOOD BY WESTERGREN METHOD: 14 MM/H
EST. GFR  (AFRICAN AMERICAN): 25 ML/MIN/1.73 M^2
EST. GFR  (NON AFRICAN AMERICAN): 22 ML/MIN/1.73 M^2
FIO2: 30
GLUCOSE SERPL-MCNC: 41 MG/DL (ref 70–110)
HCO3 UR-SCNC: 23.7 MMOL/L (ref 24–28)
HCT VFR BLD AUTO: 37.8 % (ref 40–54)
HGB BLD-MCNC: 12.5 G/DL (ref 14–18)
IP: 12
LYMPHOCYTES # BLD AUTO: 1.1 K/UL (ref 1–4.8)
LYMPHOCYTES NFR BLD: 12.1 % (ref 18–48)
MCH RBC QN AUTO: 29.4 PG (ref 27–31)
MCHC RBC AUTO-ENTMCNC: 33.1 G/DL (ref 32–36)
MCV RBC AUTO: 89 FL (ref 82–98)
MITRAL VALVE REGURGITATION: ABNORMAL
MODE: ABNORMAL
MONOCYTES # BLD AUTO: 1.2 K/UL (ref 0.3–1)
MONOCYTES NFR BLD: 12.9 % (ref 4–15)
NEUTROPHILS # BLD AUTO: 6.8 K/UL (ref 1.8–7.7)
NEUTROPHILS NFR BLD: 73.5 % (ref 38–73)
PCO2 BLDA: 39.9 MMHG (ref 35–45)
PH SMN: 7.38 [PH] (ref 7.35–7.45)
PLATELET # BLD AUTO: 332 K/UL (ref 150–350)
PMV BLD AUTO: 9.4 FL (ref 9.2–12.9)
PO2 BLDA: 83 MMHG (ref 80–100)
POC BE: -1 MMOL/L
POC SATURATED O2: 96 % (ref 95–100)
POCT GLUCOSE: 101 MG/DL (ref 70–110)
POCT GLUCOSE: 162 MG/DL (ref 70–110)
POCT GLUCOSE: 175 MG/DL (ref 70–110)
POCT GLUCOSE: 186 MG/DL (ref 70–110)
POCT GLUCOSE: 206 MG/DL (ref 70–110)
POCT GLUCOSE: 39 MG/DL (ref 70–110)
POCT GLUCOSE: 70 MG/DL (ref 70–110)
POCT GLUCOSE: 72 MG/DL (ref 70–110)
POCT GLUCOSE: 75 MG/DL (ref 70–110)
POCT GLUCOSE: 76 MG/DL (ref 70–110)
POTASSIUM SERPL-SCNC: 3.6 MMOL/L (ref 3.5–5.1)
PROT SERPL-MCNC: 6.6 G/DL (ref 6–8.4)
RBC # BLD AUTO: 4.25 M/UL (ref 4.6–6.2)
RETIRED EF AND QEF - SEE NOTES: 25 (ref 55–65)
SAMPLE: ABNORMAL
SITE: ABNORMAL
SODIUM SERPL-SCNC: 144 MMOL/L (ref 136–145)
TRICUSPID VALVE REGURGITATION: ABNORMAL
TROPONIN I SERPL DL<=0.01 NG/ML-MCNC: 0.06 NG/ML (ref 0–0.03)
WBC # BLD AUTO: 9.19 K/UL (ref 3.9–12.7)

## 2019-05-28 PROCEDURE — 63600175 PHARM REV CODE 636 W HCPCS: Mod: HCNC | Performed by: INTERNAL MEDICINE

## 2019-05-28 PROCEDURE — 99222 1ST HOSP IP/OBS MODERATE 55: CPT | Mod: HCNC,,, | Performed by: INTERNAL MEDICINE

## 2019-05-28 PROCEDURE — 96374 THER/PROPH/DIAG INJ IV PUSH: CPT | Mod: HCNC

## 2019-05-28 PROCEDURE — 94660 CPAP INITIATION&MGMT: CPT | Mod: HCNC

## 2019-05-28 PROCEDURE — C8929 TTE W OR WO FOL WCON,DOPPLER: HCPCS | Mod: HCNC

## 2019-05-28 PROCEDURE — 99900035 HC TECH TIME PER 15 MIN (STAT): Mod: HCNC

## 2019-05-28 PROCEDURE — 82962 GLUCOSE BLOOD TEST: CPT | Mod: HCNC

## 2019-05-28 PROCEDURE — 25000003 PHARM REV CODE 250: Mod: HCNC | Performed by: NURSE PRACTITIONER

## 2019-05-28 PROCEDURE — 25000003 PHARM REV CODE 250: Mod: HCNC | Performed by: INTERNAL MEDICINE

## 2019-05-28 PROCEDURE — 99291 CRITICAL CARE FIRST HOUR: CPT | Mod: HCNC,,, | Performed by: INTERNAL MEDICINE

## 2019-05-28 PROCEDURE — 99292 PR CRITICAL CARE, ADDL 30 MIN: ICD-10-PCS | Mod: HCNC,,, | Performed by: INTERNAL MEDICINE

## 2019-05-28 PROCEDURE — 83880 ASSAY OF NATRIURETIC PEPTIDE: CPT | Mod: HCNC

## 2019-05-28 PROCEDURE — 27000190 HC CPAP FULL FACE MASK W/VALVE: Mod: HCNC

## 2019-05-28 PROCEDURE — 93010 ELECTROCARDIOGRAM REPORT: CPT | Mod: HCNC,,, | Performed by: INTERNAL MEDICINE

## 2019-05-28 PROCEDURE — 99223 1ST HOSP IP/OBS HIGH 75: CPT | Mod: HCNC,,, | Performed by: INTERNAL MEDICINE

## 2019-05-28 PROCEDURE — 63600175 PHARM REV CODE 636 W HCPCS: Mod: HCNC | Performed by: NURSE PRACTITIONER

## 2019-05-28 PROCEDURE — 93010 EKG 12-LEAD: ICD-10-PCS | Mod: HCNC,,, | Performed by: INTERNAL MEDICINE

## 2019-05-28 PROCEDURE — 25000003 PHARM REV CODE 250: Mod: HCNC | Performed by: EMERGENCY MEDICINE

## 2019-05-28 PROCEDURE — 80053 COMPREHEN METABOLIC PANEL: CPT | Mod: HCNC

## 2019-05-28 PROCEDURE — 93005 ELECTROCARDIOGRAM TRACING: CPT | Mod: HCNC

## 2019-05-28 PROCEDURE — 99223 PR INITIAL HOSPITAL CARE,LEVL III: ICD-10-PCS | Mod: HCNC,,, | Performed by: INTERNAL MEDICINE

## 2019-05-28 PROCEDURE — 85025 COMPLETE CBC W/AUTO DIFF WBC: CPT | Mod: HCNC

## 2019-05-28 PROCEDURE — 93306 2D ECHO WITH COLOR FLOW DOPPLER: ICD-10-PCS | Mod: 26,HCNC,, | Performed by: INTERNAL MEDICINE

## 2019-05-28 PROCEDURE — 93306 TTE W/DOPPLER COMPLETE: CPT | Mod: 26,HCNC,, | Performed by: INTERNAL MEDICINE

## 2019-05-28 PROCEDURE — 99291 PR CRITICAL CARE, E/M 30-74 MINUTES: ICD-10-PCS | Mod: HCNC,,, | Performed by: INTERNAL MEDICINE

## 2019-05-28 PROCEDURE — 99292 CRITICAL CARE ADDL 30 MIN: CPT | Mod: HCNC,,, | Performed by: INTERNAL MEDICINE

## 2019-05-28 PROCEDURE — 99222 PR INITIAL HOSPITAL CARE,LEVL II: ICD-10-PCS | Mod: HCNC,,, | Performed by: INTERNAL MEDICINE

## 2019-05-28 PROCEDURE — 84484 ASSAY OF TROPONIN QUANT: CPT | Mod: HCNC

## 2019-05-28 PROCEDURE — 99291 CRITICAL CARE FIRST HOUR: CPT | Mod: 25,HCNC

## 2019-05-28 PROCEDURE — 21400001 HC TELEMETRY ROOM: Mod: HCNC

## 2019-05-28 RX ORDER — IBUPROFEN 200 MG
16 TABLET ORAL
Status: DISCONTINUED | OUTPATIENT
Start: 2019-05-28 | End: 2019-05-30 | Stop reason: HOSPADM

## 2019-05-28 RX ORDER — OCTREOTIDE ACETATE 50 UG/ML
50 INJECTION, SOLUTION INTRAVENOUS; SUBCUTANEOUS EVERY 6 HOURS
Status: DISCONTINUED | OUTPATIENT
Start: 2019-05-28 | End: 2019-05-30

## 2019-05-28 RX ORDER — HYDRALAZINE HYDROCHLORIDE 50 MG/1
100 TABLET, FILM COATED ORAL EVERY 12 HOURS
Status: DISCONTINUED | OUTPATIENT
Start: 2019-05-28 | End: 2019-05-30 | Stop reason: HOSPADM

## 2019-05-28 RX ORDER — FUROSEMIDE 10 MG/ML
80 INJECTION INTRAMUSCULAR; INTRAVENOUS ONCE
Status: DISCONTINUED | OUTPATIENT
Start: 2019-05-28 | End: 2019-05-28

## 2019-05-28 RX ORDER — CLOPIDOGREL BISULFATE 75 MG/1
75 TABLET ORAL DAILY
Status: DISCONTINUED | OUTPATIENT
Start: 2019-05-28 | End: 2019-05-30 | Stop reason: HOSPADM

## 2019-05-28 RX ORDER — POTASSIUM CHLORIDE 20 MEQ/15ML
40 SOLUTION ORAL ONCE
Status: COMPLETED | OUTPATIENT
Start: 2019-05-28 | End: 2019-05-28

## 2019-05-28 RX ORDER — ISOSORBIDE MONONITRATE 30 MG/1
30 TABLET, EXTENDED RELEASE ORAL DAILY
Status: DISCONTINUED | OUTPATIENT
Start: 2019-05-28 | End: 2019-05-29

## 2019-05-28 RX ORDER — GLUCAGON 1 MG
1 KIT INJECTION
Status: DISCONTINUED | OUTPATIENT
Start: 2019-05-28 | End: 2019-05-30 | Stop reason: HOSPADM

## 2019-05-28 RX ORDER — AMLODIPINE BESYLATE 10 MG/1
10 TABLET ORAL DAILY
Status: DISCONTINUED | OUTPATIENT
Start: 2019-05-28 | End: 2019-05-30 | Stop reason: HOSPADM

## 2019-05-28 RX ORDER — DEXTROSE MONOHYDRATE 25 G/50ML
25 INJECTION, SOLUTION INTRAVENOUS
Status: COMPLETED | OUTPATIENT
Start: 2019-05-28 | End: 2019-05-28

## 2019-05-28 RX ORDER — HEPARIN SODIUM 5000 [USP'U]/ML
5000 INJECTION, SOLUTION INTRAVENOUS; SUBCUTANEOUS EVERY 8 HOURS
Status: DISCONTINUED | OUTPATIENT
Start: 2019-05-28 | End: 2019-05-30 | Stop reason: HOSPADM

## 2019-05-28 RX ORDER — PANTOPRAZOLE SODIUM 40 MG/1
40 TABLET, DELAYED RELEASE ORAL DAILY
Status: DISCONTINUED | OUTPATIENT
Start: 2019-05-28 | End: 2019-05-30 | Stop reason: HOSPADM

## 2019-05-28 RX ORDER — FUROSEMIDE 10 MG/ML
80 INJECTION INTRAMUSCULAR; INTRAVENOUS ONCE
Status: COMPLETED | OUTPATIENT
Start: 2019-05-28 | End: 2019-05-28

## 2019-05-28 RX ORDER — IBUPROFEN 200 MG
24 TABLET ORAL
Status: DISCONTINUED | OUTPATIENT
Start: 2019-05-28 | End: 2019-05-30 | Stop reason: HOSPADM

## 2019-05-28 RX ADMIN — PANTOPRAZOLE SODIUM 40 MG: 40 TABLET, DELAYED RELEASE ORAL at 08:05

## 2019-05-28 RX ADMIN — FUROSEMIDE 80 MG: 10 INJECTION, SOLUTION INTRAVENOUS at 06:05

## 2019-05-28 RX ADMIN — ISOSORBIDE MONONITRATE 30 MG: 30 TABLET, EXTENDED RELEASE ORAL at 11:05

## 2019-05-28 RX ADMIN — OCTREOTIDE ACETATE 50 MCG: 50 INJECTION, SOLUTION INTRAVENOUS; SUBCUTANEOUS at 06:05

## 2019-05-28 RX ADMIN — HYDRALAZINE HYDROCHLORIDE 100 MG: 50 TABLET ORAL at 08:05

## 2019-05-28 RX ADMIN — OCTREOTIDE ACETATE 50 MCG: 50 INJECTION, SOLUTION INTRAVENOUS; SUBCUTANEOUS at 09:05

## 2019-05-28 RX ADMIN — HYDRALAZINE HYDROCHLORIDE 100 MG: 50 TABLET ORAL at 09:05

## 2019-05-28 RX ADMIN — HEPARIN SODIUM 5000 UNITS: 5000 INJECTION, SOLUTION INTRAVENOUS; SUBCUTANEOUS at 09:05

## 2019-05-28 RX ADMIN — DEXTROSE MONOHYDRATE 25 G: 500 INJECTION PARENTERAL at 05:05

## 2019-05-28 RX ADMIN — AMLODIPINE BESYLATE 10 MG: 10 TABLET ORAL at 08:05

## 2019-05-28 RX ADMIN — POTASSIUM CHLORIDE 40 MEQ: 20 SOLUTION ORAL at 06:05

## 2019-05-28 RX ADMIN — NITROGLYCERIN 1 INCH: 20 OINTMENT TOPICAL at 04:05

## 2019-05-28 RX ADMIN — HEPARIN SODIUM 5000 UNITS: 5000 INJECTION, SOLUTION INTRAVENOUS; SUBCUTANEOUS at 02:05

## 2019-05-28 RX ADMIN — CLOPIDOGREL BISULFATE 75 MG: 75 TABLET ORAL at 11:05

## 2019-05-28 NOTE — ASSESSMENT & PLAN NOTE
5/28/19 5/11/19 4/30/19 5/8/14   Chem Profile   Creatinine  2.7High   2.3High   2.4High   1.4    eGFR if   25Abnormal   30Abnormal   28.8Abnormal   57.2Abnormal     eGFR if non   22Abnormal   26Abnormal   24.9Abnormal   49.5Abnormal              Monitor BUN / Cr. Montor urine output. Monitor and replete electrolytes as needed.   Nephrology consultation.

## 2019-05-28 NOTE — ASSESSMENT & PLAN NOTE
Supplement oxygenation. Keep SAO2 >= 92%. BIPAP 10/5 QHS and PRN. Bronchodilators per orders. Wean BIPAP as tolerated.

## 2019-05-28 NOTE — HPI
Pt admitted with CHF exacerbation.  Pt c/o increased dyspnea on exertion and at rest over the last several days (wife states has been longer duration).  Denies chest pain sxs.    He has h/o 4 v CABG, followed by outside Cardiology group.  Echo 4/19 showed EF 25%, mild AS, mild-mod MR.  Admit labs shows troponin leak 0.064, BNP 1406 and renal failure with creatinine 2.7  He feels better at time of consult after receiving IV lasix.  Has h/o multiple CVA.

## 2019-05-28 NOTE — ASSESSMENT & PLAN NOTE
SERA = 1.6 cm2, AVAi = 0.75 cm2/m2, mean gradient = 8 mmHg.    Medical management.    [x]        Treatment of High Blood Pressure  (goal of < 140/90 mm Hg // < 130/80 mm Hg in patients with DM or CKD)  [x]        Beta-blocker therapy.

## 2019-05-28 NOTE — NURSING
Pt rec'd to room on RA; AAAOx4; able to transfer from stretcher to bed without distress; placed on cardiac monitor

## 2019-05-28 NOTE — HPI
Came to the Emergency Room for shortness of breath worsening over the last couple days.  Worse at rest and with exertion.  He endorses orthopnea at baseline.  Denies any other recent illness.  He was recently started on Glipizide and was hypoglycemic in the ED.                   History of Present Illness: Carlitos Ellington is a 78 y.o. male patient with a PMHx of carotid stenosis, CKD, CHF, DM, CBA, HLD, HTN, PSHx of CABGx4 who presents to the Emergency Department for orthopnea which onset gradually 2 months ago and worsened 2-3 days ago. Symptoms are worsening and severe. Pt takes lasix every 3 days. Associated sxs include diaphoresis. Patient denies any fever, chills, cough, congestion, CP, BLE edema/pain, palpitations, n/v, extremity weakness/numbness, dizziness, recent travel/long fransico rides, and all other sxs at this time. No further complaints or concerns at this time.

## 2019-05-28 NOTE — TELEPHONE ENCOUNTER
----- Message from Mauri Ron MD sent at 5/28/2019  1:13 PM CDT -----  I have seen the patient in consultation for chronic kidney disease stage 4.  Please follow-up the patient in my clinic whenever next available clinic is    I have ordered my routine follow-up labs should be ordered 1 week prior to the visit

## 2019-05-28 NOTE — HPI
78 year old male who  has a past medical history of Carotid stenosis, Chronic kidney disease, stage IV (severe), Congestive heart failure, NYHA class 3, chronic, systolic, Coronary artery disease, Diabetes mellitus with no complication, GERD (gastroesophageal reflux disease), History of CVA (cerebrovascular accident) (11/2018), Hyperlipidemia associated with type 2 diabetes mellitus, Hypertension associated with diabetes, Mild AI (aortic insufficiency), Moderate mitral regurgitation, and Type II diabetes mellitus with renal manifestations admitted with complaints of progressive dyspnea. He was recently started on PO glipizide on 05/10/19. Noted to be hypoglycemic in the ED with BG in the 50's. Responded with IV D50. Admitted to the MICU on NIPPV BIPAP and on q 1 hour blood sugar monitoring.     Of note he was seen, treated and discharged from [facility] ED on 05/11/19 for a prolonged diarrheal illness. He reports that diarrhea has resolved but he has remained persistently weak. He is on chroninc diuretic therapy for his CHF.

## 2019-05-28 NOTE — H&P
[facility] Hospital Medicine  History & Physical    Patient Name: Carlitos Ellington  MRN: [id number]  Admission Date: 5/28/2019  Attending Physician: Car Odonnell MD   Primary Care Provider: Raza Uribe MD         Patient information was obtained from patient, spouse/SO, past medical records and ER records.     Subjective:     Principal Problem:Acute hypoxemic respiratory failure    Chief Complaint:   Chief Complaint   Patient presents with    Shortness of Breath     WILLIS        HPI: Came to the Emergency Room for shortness of breath worsening over the last couple days.  Worse at rest and with exertion.  He endorses orthopnea at baseline.  Denies any other recent illness.  He was recently started on Glipizide and was hypoglycemic in the ED.                   History of Present Illness: Carlitos Ellington is a 78 y.o. male patient with a PMHx of carotid stenosis, CKD, CHF, DM, CBA, HLD, HTN, PSHx of CABGx4 who presents to the Emergency Department for orthopnea which onset gradually 2 months ago and worsened 2-3 days ago. Symptoms are worsening and severe. Pt takes lasix every 3 days. Associated sxs include diaphoresis. Patient denies any fever, chills, cough, congestion, CP, BLE edema/pain, palpitations, n/v, extremity weakness/numbness, dizziness, recent travel/long fransico rides, and all other sxs at this time. No further complaints or concerns at this time.        Past Medical History:   Diagnosis Date    Carotid [facility]     50% [facility]    Chronic kidney disease, stage IV (severe)     Congestive heart failure, NYHA class 3, chronic, systolic     Coronary artery disease     Diabetes mellitus with no complication     GERD (gastroesophageal reflux disease)     History of CVA (cerebrovascular accident) 11/2018    MRI  reveals right parietal lobe infarct    Hx of CABG 5/28/2019    Hyperlipidemia associated with type 2 diabetes mellitus     Hypertension  associated with diabetes     Mild AI (aortic insufficiency)     Moderate mitral regurgitation     Type II diabetes mellitus with renal manifestations        Past Surgical History:   Procedure Laterality Date    CORONARY ARTERY BYPASS GRAFT      CORONARY STENT PLACEMENT      KIDNEY STONE SURGERY         Review of patient's allergies indicates:   Allergen Reactions    Influenza virus vaccines Other (See Comments)     Red streaks up arm    Penicillins Swelling       No current facility-administered medications on file prior to encounter.      Current Outpatient Medications on File Prior to Encounter   Medication Sig    amlodipine (NORVASC) 10 MG tablet Take 10 mg by mouth once daily.    aspirin 325 MG tablet Take 81 mg by mouth once daily.     clopidogrel (PLAVIX) 75 mg tablet Take 1 tablet (75 mg total) by mouth once daily.    escitalopram (LEXAPRO) 10 MG tablet Take 1 tablet (10 mg total) by mouth once daily.    furosemide (LASIX) 40 MG tablet Take 40 mg by mouth once daily.    hydrALAZINE (APRESOLINE) 100 MG tablet Take 1 tablet (100 mg total) by mouth 2 (two) times daily.    insulin glargine (LANTUS) 100 unit/mL injection Inject 50 Units into the skin every evening.     isosorbide dinitrate (ISORDIL) 20 MG tablet Take 1 tablet (20 mg total) by mouth 3 (three) times daily.    metoprolol succinate (TOPROL-XL) 50 MG 24 hr tablet Take 50 mg by mouth once daily.    prazosin (MINIPRESS) 2 MG Cap Take 1 mg by mouth 2 (two) times daily.    prednisoLONE acetate (PRED FORTE) 1 % DrpS     rosuvastatin (CRESTOR) 40 MG Tab Take 1 tablet (40 mg total) by mouth every evening.    [DISCONTINUED] glipiZIDE (GLUCOTROL) 10 MG tablet Take 1 tablet (10 mg total) by mouth 2 (two) times daily before meals.    ACCU-CHEK MASHA PLUS Strp     omeprazole (PRILOSEC) 20 MG capsule Take 20 mg by mouth once daily.    ondansetron (ZOFRAN-ODT) 4 MG TbDL Take 1 tablet (4 mg total) by mouth every 8 (eight) hours as needed (prn  nausea/vomiting).    [DISCONTINUED] sertraline (ZOLOFT) 50 MG tablet Take 25 mg by mouth.     Family History     None        Tobacco Use    Smoking status: Former Smoker    Smokeless tobacco: Current User     Types: Snuff   Substance and Sexual Activity    Alcohol use: No    Drug use: No    Sexual activity: Not on file     Review of Systems   Constitutional: Negative.  Negative for chills and fever.   HENT: Negative.  Negative for congestion and sore throat.    Eyes: Negative.  Negative for visual disturbance.   Respiratory: Positive for cough and shortness of breath. Negative for wheezing.    Cardiovascular: Positive for leg swelling. Negative for chest pain.   Gastrointestinal: Negative for abdominal pain, diarrhea, nausea and vomiting.   Endocrine: Negative.    Genitourinary: Negative.    Musculoskeletal: Negative.  Negative for myalgias and neck stiffness.   Skin: Negative.  Negative for color change and pallor.   Allergic/Immunologic: Negative.    Neurological: Positive for weakness.   Hematological: Negative.    Psychiatric/Behavioral: Negative.    All other systems reviewed and are negative.    Objective:     Vital Signs (Most Recent):  Temp: 98.5 °F (36.9 °C) (05/28/19 1500)  Pulse: 70 (05/28/19 1700)  Resp: 18 (05/28/19 1700)  BP: (!) 169/70 (05/28/19 1700)  SpO2: 96 % (05/28/19 1700) Vital Signs (24h Range):  Temp:  [97.6 °F (36.4 °C)-98.5 °F (36.9 °C)] 98.5 °F (36.9 °C)  Pulse:  [54-78] 70  Resp:  [10-22] 18  SpO2:  [89 %-97 %] 96 %  BP: (140-209)/(35-78) 169/70     Weight: 99.3 kg (219 lb)  Body mass index is 31.42 kg/m².    Physical Exam   Constitutional: He is oriented to person, place, and time. He appears well-developed and well-nourished. No distress.   HENT:   Head: Normocephalic and atraumatic.   Mouth/Throat: Oropharynx is clear and moist.   Eyes: Pupils are equal, round, and reactive to light. Conjunctivae and EOM are normal.   Neck: No JVD present. No thyromegaly present.    Cardiovascular: Normal rate, regular rhythm and normal heart sounds. Exam reveals no gallop and no friction rub.   No murmur heard.  Pulmonary/Chest: Effort normal. No respiratory distress. He has no wheezes. He has no rales.   Mild crackles bilaterally   Abdominal: Soft. Bowel sounds are normal. He exhibits no distension. There is no tenderness. There is no rebound and no guarding.   Musculoskeletal: Normal range of motion. He exhibits edema. He exhibits no tenderness.   Lymphadenopathy:     He has no cervical adenopathy.   Neurological: He is alert and oriented to person, place, and time. He has normal reflexes. He displays normal reflexes. No cranial nerve deficit.   Skin: Skin is warm and dry. No rash noted. He is not diaphoretic. No erythema.   Psychiatric: He has a normal mood and affect. His behavior is normal. Judgment and thought content normal.         CRANIAL NERVES     CN III, IV, VI   Pupils are equal, round, and reactive to light.  Extraocular motions are normal.        Significant Labs: All pertinent labs within the past 24 hours have been reviewed.    Significant Imaging: I have reviewed all pertinent imaging results/findings within the past 24 hours.    Assessment/Plan:     * Acute hypoxemic respiratory failure  Due to acute heart failure exacerbation.  Responding to diuresis.  Weaned off BiPAP.  Continue supplemental oxygen.    Acute on chronic combined systolic and diastolic heart failure  Continuing diuresis and fluid/Sodium restriction.  Daily weights and strict I&O.  Cardiology to evaluate.  Troponin slightly elevated.  No acute EKG changes.    Hypoglycemia  Possibly due to recent addition of Glipizide.  Hourly blood sugar checks.  Also gave Octreotide.  Resume Diabetic diet.  Holding home medications.    Chronic kidney disease, stage IV (severe)  Careful fluid management.  Monitor urine output and renal function chemistries.  Nephrology to evaluate.    History of CVA (cerebrovascular  accident)  PT and OT evaluation.      VTE Risk Mitigation (From admission, onward)        Ordered     heparin (porcine) injection 5,000 Units  Every 8 hours      05/28/19 0808        Critical care time spent on the evaluation and treatment of severe organ dysfunction, review of pertinent labs and imaging studies, discussions with consulting providers and discussions with patient/family: 30 minutes.     Car Odonnell MD  Department of Hospital Medicine   [facility]

## 2019-05-28 NOTE — ASSESSMENT & PLAN NOTE
Careful fluid management.  Monitor urine output and renal function chemistries.  Nephrology to evaluate.

## 2019-05-28 NOTE — ASSESSMENT & PLAN NOTE
Continuing diuresis and fluid/Sodium restriction.  Daily weights and strict I&O.  Cardiology to evaluate.  Troponin slightly elevated.  No acute EKG changes.

## 2019-05-28 NOTE — ASSESSMENT & PLAN NOTE
Patient has multiorgan failure including acute on chronic congestive heart failure with ischemic cardiomyopathy.  Also has valvular heart disease.  Also has type 2 diabetes and hypertension.  Patient also has chronic kidney disease which is getting worse with time.  Approximate GFR is about 20-25%.  Will check cystatin C for accurate GFR.  Will follow the patient as an outpatient.  It is my understanding that the patient is comfort care only and is DNR.    Will arrange a routine follow-up in the clinic in 1 month

## 2019-05-28 NOTE — ED PROVIDER NOTES
SCRIBE #1 NOTE: I, Charlene Lombardi, am scribing for, and in the presence of, Stanley David MD. I have scribed the entire note.      History      Chief Complaint   Patient presents with    Shortness of Breath     WILLIS       Review of patient's allergies indicates:   Allergen Reactions    Influenza virus vaccines Other (See Comments)     Red streaks up arm    Penicillins Swelling        HPI   HPI    5/28/2019, 4:33 AM   History obtained from the patient and wife      History of Present Illness: Carlitos Ellington is a 78 y.o. male patient with a PMHx of carotid stenosis, CKD, CHF, DM, CBA, HLD, HTN, PSHx of CABGx4 who presents to the Emergency Department for orthopnea which onset gradually 2 months ago and worsened 2-3 days ago. Symptoms are worsening and severe. Pt takes lasix every 3 days. Associated sxs include diaphoresis. Patient denies any fever, chills, cough, congestion, CP, BLE edema/pain, palpitations, n/v, extremity weakness/numbness, dizziness, recent travel/long fransico rides, and all other sxs at this time. No further complaints or concerns at this time.       Arrival mode: Personal vehicle      PCP: Raza Uribe MD       Past Medical History:  Past Medical History:   Diagnosis Date    Carotid stenosis     50% [facility]    Chronic kidney disease, stage IV (severe)     Congestive heart failure, NYHA class 3, chronic, systolic     Coronary artery disease     Diabetes mellitus with no complication     GERD (gastroesophageal reflux disease)     History of CVA (cerebrovascular accident) 11/2018    MRI  reveals right parietal lobe infarct    Hx of CABG 5/28/2019    Hyperlipidemia associated with type 2 diabetes mellitus     Hypertension associated with diabetes     Mild AI (aortic insufficiency)     Moderate mitral regurgitation     Type II diabetes mellitus with renal manifestations        Past Surgical History:  Past Surgical History:   Procedure Laterality Date     CORONARY ARTERY BYPASS GRAFT      CORONARY STENT PLACEMENT      KIDNEY STONE SURGERY           Family History:  History reviewed. No pertinent family history.      Social History:  Social History     Tobacco Use    Smoking status: Former Smoker    Smokeless tobacco: Current User     Types: Snuff   Substance and Sexual Activity    Alcohol use: No    Drug use: No    Sexual activity: unknown       ROS   Review of Systems   Constitutional: Positive for diaphoresis. Negative for chills and fever.   HENT: Negative for congestion and sore throat.    Eyes: Negative for visual disturbance.   Respiratory: Positive for shortness of breath. Negative for cough.    Cardiovascular: Negative for chest pain, palpitations and leg swelling.   Gastrointestinal: Negative for nausea and vomiting.   Genitourinary: Negative for dysuria.   Musculoskeletal: Negative for back pain and myalgias.   Skin: Negative for rash.   Neurological: Negative for dizziness, weakness and numbness.   Hematological: Does not bruise/bleed easily.   All other systems reviewed and are negative.    Physical Exam      Initial Vitals [05/28/19 0418]   BP Pulse Resp Temp SpO2   (!) 183/73 72 (!) 22 97.6 °F (36.4 °C) (!) 91 %      MAP       --          Physical Exam  Nursing Notes and Vital Signs Reviewed.  Constitutional: Patient is in moderate distress. Well-developed and well-nourished.  Head: Atraumatic. Normocephalic.  Eyes: PERRL. EOM intact. Conjunctivae are not pale. No scleral icterus.  ENT: Mucous membranes are moist. Oropharynx is clear and symmetric.    Neck: Supple. Full ROM. No lymphadenopathy.  Cardiovascular: Regular rate. Regular rhythm. No murmurs, rubs, or gallops. Distal pulses are 2+ and symmetric.  Pulmonary/Chest: Tachypnea. Respiratory tiring. Crackles bilaterally, worse on the L lower base.   Abdominal: Soft and non-distended.  There is no tenderness.  No rebound, guarding, or rigidity.  Musculoskeletal: Moves all extremities. No  obvious deformities. No edema. No calf tenderness.  Skin: Warm. Diaphoretic.   Neurological:  Alert, awake, and appropriate.  Normal speech.  No acute focal neurological deficits are appreciated.  Psychiatric: Normal affect. Good eye contact. Appropriate in content.    ED Course    Critical Care  Date/Time: 5/28/2019 5:54 AM  Performed by: Stanley David MD  Authorized by: Stanley David MD   Direct patient critical care time: 10 minutes  Additional history critical care time: 5 minutes  Ordering / reviewing critical care time: 10 minutes  Documentation critical care time: 5 minutes  Consulting other physicians critical care time: 5 minutes  Total critical care time (exclusive of procedural time) : 35 minutes  Critical care time was exclusive of teaching time and separately billable procedures and treating other patients.  Critical care was necessary to treat or prevent imminent or life-threatening deterioration of the following conditions: CHF, Hypoglycemia.  Critical care was time spent personally by me on the following activities: blood draw for specimens, development of treatment plan with patient or surrogate, discussions with consultants, interpretation of cardiac output measurements, evaluation of patient's response to treatment, examination of patient, obtaining history from patient or surrogate, ordering and performing treatments and interventions, ordering and review of laboratory studies, ordering and review of radiographic studies, pulse oximetry, re-evaluation of patient's condition and review of old charts.        ED Vital Signs:  Vitals:    05/28/19 0900 05/28/19 0915 05/28/19 0930 05/28/19 0945   BP: (!) 161/61 (!) 170/42 (!) 157/35 (!) 160/49   Pulse: 61 (!) 59 62 63   Resp: 16 15 17 13   Temp:       TempSrc:       SpO2: 96% 96% 96% (!) 93%   Weight:       Height:        05/28/19 1000 05/28/19 1030 05/28/19 1100 05/28/19 1130   BP: (!) 140/41 (!) 161/50 (!) 161/60 (!) 159/58   Pulse: (!)  55 65 63 70   Resp: 15 12 10 14   Temp:   98 °F (36.7 °C)    TempSrc:   Oral    SpO2: (!) 89% 95% (!) 92% (!) 94%   Weight:       Height:        05/28/19 1200 05/28/19 1230 05/28/19 1300 05/28/19 1400   BP: (!) 158/59 (!) 151/47 (!) 141/67 (!) 161/44   Pulse: 75 67 64 64   Resp: 17 16 16 16   Temp:       TempSrc:       SpO2: (!) 92% 96% 96% 96%   Weight:       Height:        05/28/19 1500 05/28/19 1600 05/28/19 1700   BP: (!) 146/66 (!) 155/67 (!) 169/70   Pulse: 76 78 70   Resp: 16 19 18   Temp: 98.5 °F (36.9 °C)     TempSrc: Oral     SpO2: 95% 95% 96%   Weight:      Height:          Abnormal Lab Results:  Labs Reviewed   CBC W/ AUTO DIFFERENTIAL - Abnormal; Notable for the following components:       Result Value    RBC 4.25 (*)     Hemoglobin 12.5 (*)     Hematocrit 37.8 (*)     RDW 15.9 (*)     Mono # 1.2 (*)     Gran% 73.5 (*)     Lymph% 12.1 (*)     All other components within normal limits   COMPREHENSIVE METABOLIC PANEL - Abnormal; Notable for the following components:    Chloride 111 (*)     Glucose 41 (*)     Creatinine 2.7 (*)     Albumin 3.2 (*)     eGFR if  25 (*)     eGFR if non  22 (*)     All other components within normal limits    Narrative:     GLUC critical result(s) called and verbal readback obtained from   Miriam Iverson RN, 05/28/2019 05:04   B-TYPE NATRIURETIC PEPTIDE - Abnormal; Notable for the following components:    BNP 1,406 (*)     All other components within normal limits   TROPONIN I - Abnormal; Notable for the following components:    Troponin I 0.064 (*)     All other components within normal limits   POCT GLUCOSE - Abnormal; Notable for the following components:    POCT Glucose 39 (*)     All other components within normal limits   ISTAT PROCEDURE - Abnormal; Notable for the following components:    POC HCO3 23.7 (*)     All other components within normal limits   POCT GLUCOSE - Abnormal; Notable for the following components:    POCT Glucose 162  (*)     All other components within normal limits   POCT GLUCOSE   POCT GLUCOSE MONITORING CONTINUOUS        All Lab Results:  Results for orders placed or performed during the hospital encounter of 05/28/19   CBC auto differential   Result Value Ref Range    WBC 9.19 3.90 - 12.70 K/uL    RBC 4.25 (L) 4.60 - 6.20 M/uL    Hemoglobin 12.5 (L) 14.0 - 18.0 g/dL    Hematocrit 37.8 (L) 40.0 - 54.0 %    Mean Corpuscular Volume 89 82 - 98 fL    Mean Corpuscular Hemoglobin 29.4 27.0 - 31.0 pg    Mean Corpuscular Hemoglobin Conc 33.1 32.0 - 36.0 g/dL    RDW 15.9 (H) 11.5 - 14.5 %    Platelets 332 150 - 350 K/uL    MPV 9.4 9.2 - 12.9 fL    Gran # (ANC) 6.8 1.8 - 7.7 K/uL    Lymph # 1.1 1.0 - 4.8 K/uL    Mono # 1.2 (H) 0.3 - 1.0 K/uL    Eos # 0.1 0.0 - 0.5 K/uL    Baso # 0.01 0.00 - 0.20 K/uL    Gran% 73.5 (H) 38.0 - 73.0 %    Lymph% 12.1 (L) 18.0 - 48.0 %    Mono% 12.9 4.0 - 15.0 %    Eosinophil% 1.4 0.0 - 8.0 %    Basophil% 0.1 0.0 - 1.9 %    Differential Method Automated    Comprehensive metabolic panel   Result Value Ref Range    Sodium 144 136 - 145 mmol/L    Potassium 3.6 3.5 - 5.1 mmol/L    Chloride 111 (H) 95 - 110 mmol/L    CO2 23 23 - 29 mmol/L    Glucose 41 (LL) 70 - 110 mg/dL    BUN, Bld 22 8 - 23 mg/dL    Creatinine 2.7 (H) 0.5 - 1.4 mg/dL    Calcium 9.0 8.7 - 10.5 mg/dL    Total Protein 6.6 6.0 - 8.4 g/dL    Albumin 3.2 (L) 3.5 - 5.2 g/dL    Total Bilirubin 0.4 0.1 - 1.0 mg/dL    Alkaline Phosphatase 78 55 - 135 U/L    AST 11 10 - 40 U/L    ALT 13 10 - 44 U/L    Anion Gap 10 8 - 16 mmol/L    eGFR if African American 25 (A) >60 mL/min/1.73 m^2    eGFR if non African American 22 (A) >60 mL/min/1.73 m^2   Brain natriuretic peptide   Result Value Ref Range    BNP 1,406 (H) 0 - 99 pg/mL   Troponin I   Result Value Ref Range    Troponin I 0.064 (H) 0.000 - 0.026 ng/mL   2D echo with color flow doppler   Result Value Ref Range    QEF 25 (A) 55 - 65    Mitral Valve Regurgitation MILD     Aortic Valve Stenosis MILD (A)      Tricuspid Valve Regurgitation MILD    POCT glucose   Result Value Ref Range    POCT Glucose 39 (LL) 70 - 110 mg/dL   ISTAT PROCEDURE   Result Value Ref Range    POC PH 7.382 7.35 - 7.45    POC PCO2 39.9 35 - 45 mmHg    POC PO2 83 80 - 100 mmHg    POC HCO3 23.7 (L) 24 - 28 mmol/L    POC BE -1 -2 to 2 mmol/L    POC SATURATED O2 96 95 - 100 %    Rate 14     Sample ARTERIAL     Site LR     Allens Test Pass     DelSys CPAP/BiPAP     Mode BiPAP     FiO2 30     IP 12     EP 6    POCT glucose   Result Value Ref Range    POCT Glucose 162 (H) 70 - 110 mg/dL   POCT glucose   Result Value Ref Range    POCT Glucose 101 70 - 110 mg/dL   POCT glucose   Result Value Ref Range    POCT Glucose 75 70 - 110 mg/dL   POCT glucose   Result Value Ref Range    POCT Glucose 70 70 - 110 mg/dL   POCT glucose   Result Value Ref Range    POCT Glucose 72 70 - 110 mg/dL   POCT glucose   Result Value Ref Range    POCT Glucose 76 70 - 110 mg/dL   POCT glucose   Result Value Ref Range    POCT Glucose 206 (H) 70 - 110 mg/dL   POCT glucose   Result Value Ref Range    POCT Glucose 186 (H) 70 - 110 mg/dL       Imaging Results:  Imaging Results          X-Ray Chest AP Portable (Final result)  Result time 05/28/19 08:35:09    Final result by MELISSA Stack Sr., MD (05/28/19 08:35:09)                 Impression:      1. The borders of the heart are not well seen. There is a mild amount of interstitial opacities in both lungs with Kerley B-lines visualized bilaterally.  This represents a slight interval improvement in the appearance of the lungs.  This is characteristic of pulmonary edema.  2. The right costophrenic angle is not completely included on the film. There is blunting of the right costophrenic angle.  This is characteristic of a tiny pleural effusion.  3. The left costophrenic angle is not well seen.  4. The current examination is limited secondary to patient rotation to the left.  5. There is a moderate amount of atherosclerosis.  6. Surgical  changes  .      Electronically signed by: Jose L Stack MD  Date:    05/28/2019  Time:    08:35             Narrative:    EXAMINATION:  XR CHEST AP PORTABLE    CLINICAL HISTORY:  sob;    COMPARISON:  05/11/2019    FINDINGS:  The current examination is limited secondary to patient rotation to the left.  There are multiple sternotomy wires in place.  There are multiple surgical clips projected over the mediastinum.  The borders of the heart are not well seen.  There is a moderate amount of atherosclerosis.  There is a mild amount of interstitial opacities in both lungs with Kerley B-lines visualized bilaterally.  This represents a slight interval improvement in the appearance of the lungs.  The right costophrenic angle is not completely included on the film.  There is blunting of the right costophrenic angle.  The left costophrenic angle is not well seen.  There is no pneumothorax.                               5:07 AM: Per ED physician, pt's CXR results show: vascular congestion and L pleural effusion. Cannot r/o retrocardiac infiltrate.     The EKG was ordered, reviewed, and independently interpreted by the ED provider.  Interpretation time: 0428  Rate: 79 BPM  Rhythm: sinus rhythm with frequent PVC's and PAC's  Interpretation: nonspecific intraventricular block. T wave abnormality. No STEMI.         The Emergency Provider reviewed the vital signs and test results, which are outlined above.    ED Discussion     6:05 Discussed case with Dr. Blancas (Hospital Medicine). Dr. Blancas agrees with current care and management of pt and accepts admission.   Admitting Service: Hospital medicine   Admitting Physician: Dr. Odonnell  Admit to: ICU    6:10: Re-evaluated pt. I have discussed test results, shared treatment plan, and the need for admission with patient and family at bedside. Pt and family express understanding at this time and agree with all information. All questions answered. Pt and family have no further questions  or concerns at this time. Pt is ready for admit.    ED Medication(s):  Medications   glucose chewable tablet 16 g (has no administration in time range)   glucose chewable tablet 24 g (has no administration in time range)   dextrose 50% injection 12.5 g (has no administration in time range)   dextrose 50% injection 25 g (has no administration in time range)   glucagon (human recombinant) injection 1 mg (has no administration in time range)   pantoprazole EC tablet 40 mg (40 mg Oral Given 5/28/19 0851)   heparin (porcine) injection 5,000 Units (5,000 Units Subcutaneous Given 5/28/19 1459)   amLODIPine tablet 10 mg (10 mg Oral Given 5/28/19 0851)   hydrALAZINE tablet 100 mg (100 mg Oral Given 5/28/19 0851)   octreotide injection 50 mcg (50 mcg Subcutaneous Given 5/28/19 1810)   isosorbide mononitrate 24 hr tablet 30 mg (30 mg Oral Given 5/28/19 1124)   clopidogrel tablet 75 mg (75 mg Oral Given 5/28/19 1124)   nitroGLYCERIN 2% TD oint ointment 1 inch (1 inch Topical (Top) Given 5/28/19 0451)   dextrose 50% (D50W) solution 25 g (0 g Intravenous Stopped 5/28/19 0521)   potassium chloride 10% oral solution 40 mEq (40 mEq Oral Given 5/28/19 0633)   furosemide injection 80 mg (80 mg Intravenous Given 5/28/19 0633)           Medical Decision Making    Medical Decision Making:   Clinical Tests:   Lab Tests: Ordered and Reviewed  Radiological Study: Ordered and Reviewed  Medical Tests: Ordered and Reviewed           Scribe Attestation:   Scribe #1: I performed the above scribed service and the documentation accurately describes the services I performed. I attest to the accuracy of the note.    Attending:   Physician Attestation Statement for Scribe #1: I, Stanley David MD, personally performed the services described in this documentation, as scribed by Charlene Lombardi, in my presence, and it is both accurate and complete.          Clinical Impression       ICD-10-CM ICD-9-CM   1. Hypoglycemia E16.2 251.2   2. SOB (shortness  of breath) R06.02 786.05   3. Acute on chronic congestive heart failure, unspecified heart failure type I50.9 428.0   4. CHF (congestive heart failure) I50.9 428.0               Stanley David MD  05/28/19 8414

## 2019-05-28 NOTE — HOSPITAL COURSE
05/28: Seen and examined at bedside in the MICU. Hospital chart reviewed. No acute interval detrimental events noted. He reports that he  has moderately improved.

## 2019-05-28 NOTE — ASSESSMENT & PLAN NOTE
Cardiology Consulted.  2D ECHO  Optimize CHF regimen.  Recommend referral to CHF clinic.  Preload and afterload reduction [FUROSEMIDE + HYDRALLAZINE + IMDUR].  Daily weighing.  Dietary salt restriction.    METOPROLOL ON HOLD DUE TO ACUTE DECOMPENSATION.

## 2019-05-28 NOTE — ASSESSMENT & PLAN NOTE
Possibly due to recent addition of Glipizide.  Hourly blood sugar checks.  Also gave Octreotide.  Resume Diabetic diet.  Holding home medications.

## 2019-05-28 NOTE — CONSULTS
[facility]  Critical Care Medicine  Consult Note    Patient Name: Carlitos Ellington  MRN: [id number]  Admission Date: 5/28/2019  Hospital Length of Stay: 0 days  Code Status: DNR  Attending Physician: Car Odonnell MD   Primary Care Provider: Raza Uribe MD   Principal Problem: Acute hypoxemic respiratory failure      Subjective:     HPI:  78 year old male who  has a past medical history of Carotid stenosis, Chronic kidney disease, stage IV (severe), Congestive heart failure, NYHA class 3, chronic, systolic, Coronary artery disease, Diabetes mellitus with no complication, GERD (gastroesophageal reflux disease), History of CVA (cerebrovascular accident) (11/2018), Hyperlipidemia associated with type 2 diabetes mellitus, Hypertension associated with diabetes, Mild AI (aortic insufficiency), Moderate mitral regurgitation, and Type II diabetes mellitus with renal manifestations admitted with complaints of progressive dyspnea. He was recently started on PO glipizide on 05/10/19. Noted to be hypoglycemic in the ED with BG in the 50's. Responded with IV D50. Admitted to the MICU on NIPPV BIPAP and on q 1 hour blood sugar monitoring.     Of note he was seen, treated and discharged from [facility] ED on 05/11/19 for a prolonged diarrheal illness. He reports that diarrhea has resolved but he has remained persistently weak. He is on chroninc diuretic therapy for his CHF.     Family meeting at bedside:  In attendance were the patients spouse who is his power of .   After assessing their knowlege of the patients current status, family members were apprised of the patients clinical situation in detail  Living will  is not available. Care and comfort measures discussed. Code status reviewed - DNR , no chest compression. They family expressed and voiced understanding. All questions were answered and concerns addressed.       Hospital/ICU Course:  05/28: Seen and examined at bedside in the MICU. Hospital  chart reviewed. No acute interval detrimental events noted. He reports that he  has moderately improved.       Past Medical History:   Diagnosis Date    [facility]     [facility]    Chronic kidney disease, stage IV (severe)     Congestive heart failure, NYHA class 3, chronic, systolic     Coronary artery disease     Diabetes mellitus with no complication     GERD (gastroesophageal reflux disease)     History of CVA (cerebrovascular accident) 11/2018    MRI  reveals right parietal lobe infarct    Hyperlipidemia associated with type 2 diabetes mellitus     Hypertension associated with diabetes     Mild AI (aortic insufficiency)     Moderate mitral regurgitation     Type II diabetes mellitus with renal manifestations        Past Surgical History:   Procedure Laterality Date    CORONARY ARTERY BYPASS GRAFT      CORONARY STENT PLACEMENT      KIDNEY STONE SURGERY         Review of patient's allergies indicates:   Allergen Reactions    Influenza virus vaccines Other (See Comments)     Red streaks up arm    Penicillins Swelling       Scheduled Meds:   amLODIPine  10 mg Oral Daily    heparin (porcine)  5,000 Units Subcutaneous Q8H    hydrALAZINE  100 mg Oral Q12H    octreotide  50 mcg Subcutaneous Q6H    pantoprazole  40 mg Oral Daily     Continuous Infusions:  PRN Meds:.dextrose 50%, dextrose 50%, glucagon (human recombinant), glucose, glucose       Family History     None        Tobacco Use    Smoking status: Former Smoker    Smokeless tobacco: Current User     Types: Snuff   Substance and Sexual Activity    Alcohol use: No    Drug use: No    Sexual activity: Not on file         Review of Systems   Constitutional: Positive for fatigue. Negative for chills and fever.   HENT: Negative for congestion, facial swelling and sneezing.    Eyes: Negative for discharge and itching.   Respiratory: Positive for shortness of breath.    Gastrointestinal: Negative for  abdominal pain, blood in stool and diarrhea.   Endocrine: Negative for cold intolerance and heat intolerance.   Genitourinary: Negative for difficulty urinating and flank pain.   Musculoskeletal: Negative for joint swelling, myalgias and neck pain.   Allergic/Immunologic: Negative for food allergies.   Neurological: Negative for seizures and speech difficulty.   Psychiatric/Behavioral: Negative for behavioral problems, confusion and decreased concentration.   All other systems reviewed and are negative.    Objective:     Vital Signs (Most Recent):  Temp: 97.6 °F (36.4 °C) (05/28/19 0701)  Pulse: 61 (05/28/19 0701)  Resp: 13 (05/28/19 0701)  BP: (!) 202/76 (05/28/19 0851)  SpO2: (!) 94 % (05/28/19 0739) Vital Signs (24h Range):  Temp:  [97.6 °F (36.4 °C)-97.8 °F (36.6 °C)] 97.6 °F (36.4 °C)  Pulse:  [54-72] 61  Resp:  [12-22] 13  SpO2:  [91 %-96 %] 94 %  BP: (161-202)/(71-78) 202/76     Weight: 99.3 kg (219 lb)  Body mass index is 31.42 kg/m².    No intake or output data in the 24 hours ending 05/28/19 0930    Physical Exam   Constitutional: He is oriented to person, place, and time. He appears well-developed and well-nourished.   HENT:   Head: Normocephalic and atraumatic.   Eyes: Pupils are equal, round, and reactive to light. EOM are normal.   Neck: Normal range of motion. Neck supple.   Cardiovascular: Normal rate and regular rhythm.   Pulmonary/Chest: No stridor. No respiratory distress. He has no wheezes.   Abdominal: Soft. Bowel sounds are normal.   Musculoskeletal: Normal range of motion. He exhibits no edema.   Neurological: He is alert and oriented to person, place, and time.   Skin: Skin is warm and dry. Capillary refill takes less than 2 seconds.       Vents:  Oxygen Concentration (%): 21 (05/28/19 0700)    Lines/Drains/Airways     Peripheral Intravenous Line                 Peripheral IV - Single Lumen 05/28/19 0432 20 G Left Antecubital less than 1 day                Significant Labs:    Results for  orders placed or performed during the hospital encounter of 05/28/19   CBC auto differential   Result Value Ref Range    WBC 9.19 3.90 - 12.70 K/uL    RBC 4.25 (L) 4.60 - 6.20 M/uL    Hemoglobin 12.5 (L) 14.0 - 18.0 g/dL    Hematocrit 37.8 (L) 40.0 - 54.0 %    Mean Corpuscular Volume 89 82 - 98 fL    Mean Corpuscular Hemoglobin 29.4 27.0 - 31.0 pg    Mean Corpuscular Hemoglobin Conc 33.1 32.0 - 36.0 g/dL    RDW 15.9 (H) 11.5 - 14.5 %    Platelets 332 150 - 350 K/uL    MPV 9.4 9.2 - 12.9 fL    Gran # (ANC) 6.8 1.8 - 7.7 K/uL    Lymph # 1.1 1.0 - 4.8 K/uL    Mono # 1.2 (H) 0.3 - 1.0 K/uL    Eos # 0.1 0.0 - 0.5 K/uL    Baso # 0.01 0.00 - 0.20 K/uL    Gran% 73.5 (H) 38.0 - 73.0 %    Lymph% 12.1 (L) 18.0 - 48.0 %    Mono% 12.9 4.0 - 15.0 %    Eosinophil% 1.4 0.0 - 8.0 %    Basophil% 0.1 0.0 - 1.9 %    Differential Method Automated    Comprehensive metabolic panel   Result Value Ref Range    Sodium 144 136 - 145 mmol/L    Potassium 3.6 3.5 - 5.1 mmol/L    Chloride 111 (H) 95 - 110 mmol/L    CO2 23 23 - 29 mmol/L    Glucose 41 (LL) 70 - 110 mg/dL    BUN, Bld 22 8 - 23 mg/dL    Creatinine 2.7 (H) 0.5 - 1.4 mg/dL    Calcium 9.0 8.7 - 10.5 mg/dL    Total Protein 6.6 6.0 - 8.4 g/dL    Albumin 3.2 (L) 3.5 - 5.2 g/dL    Total Bilirubin 0.4 0.1 - 1.0 mg/dL    Alkaline Phosphatase 78 55 - 135 U/L    AST 11 10 - 40 U/L    ALT 13 10 - 44 U/L    Anion Gap 10 8 - 16 mmol/L    eGFR if African American 25 (A) >60 mL/min/1.73 m^2    eGFR if non African American 22 (A) >60 mL/min/1.73 m^2   Brain natriuretic peptide   Result Value Ref Range    BNP 1,406 (H) 0 - 99 pg/mL   Troponin I   Result Value Ref Range    Troponin I 0.064 (H) 0.000 - 0.026 ng/mL   POCT glucose   Result Value Ref Range    POCT Glucose 39 (LL) 70 - 110 mg/dL   ISTAT PROCEDURE   Result Value Ref Range    POC PH 7.382 7.35 - 7.45    POC PCO2 39.9 35 - 45 mmHg    POC PO2 83 80 - 100 mmHg    POC HCO3 23.7 (L) 24 - 28 mmol/L    POC BE -1 -2 to 2 mmol/L    POC SATURATED O2  96 95 - 100 %    Rate 14     Sample ARTERIAL     Site LR     Allens Test Pass     DelSys CPAP/BiPAP     Mode BiPAP     FiO2 30     IP 12     EP 6    POCT glucose   Result Value Ref Range    POCT Glucose 162 (H) 70 - 110 mg/dL   POCT glucose   Result Value Ref Range    POCT Glucose 101 70 - 110 mg/dL   POCT glucose   Result Value Ref Range    POCT Glucose 75 70 - 110 mg/dL   POCT glucose   Result Value Ref Range    POCT Glucose 70 70 - 110 mg/dL          Significant Imaging:     Imaging Results          X-Ray Chest AP Portable (Final result)  Result time 05/28/19 08:35:09    Final result by MELISSA Stack Sr., MD (05/28/19 08:35:09)                 Impression:      1. The borders of the heart are not well seen. There is a mild amount of interstitial opacities in both lungs with Kerley B-lines visualized bilaterally.  This represents a slight interval improvement in the appearance of the lungs.  This is characteristic of pulmonary edema.  2. The right costophrenic angle is not completely included on the film. There is blunting of the right costophrenic angle.  This is characteristic of a tiny pleural effusion.  3. The left costophrenic angle is not well seen.  4. The current examination is limited secondary to patient rotation to the left.  5. There is a moderate amount of atherosclerosis.  6. Surgical changes  .      Electronically signed by: Jose L Stack MD  Date:    05/28/2019  Time:    08:35             Narrative:    EXAMINATION:  XR CHEST AP PORTABLE    CLINICAL HISTORY:  sob;    COMPARISON:  05/11/2019    FINDINGS:  The current examination is limited secondary to patient rotation to the left.  There are multiple sternotomy wires in place.  There are multiple surgical clips projected over the mediastinum.  The borders of the heart are not well seen.  There is a moderate amount of atherosclerosis.  There is a mild amount of interstitial opacities in both lungs with Kerley B-lines visualized bilaterally.  This  represents a slight interval improvement in the appearance of the lungs.  The right costophrenic angle is not completely included on the film.  There is blunting of the right costophrenic angle.  The left costophrenic angle is not well seen.  There is no pneumothorax.                                    ABG  Recent Labs   Lab 05/28/19  0503   PH 7.382   PO2 83   PCO2 39.9   HCO3 23.7*   BE -1     Assessment/Plan:       I have reviewed all labs and imaging studies and compared to previous results. I have also discussed labs with all the teams in the medical care of the patient and my plan is outlined below     Neuro  History of CVA (cerebrovascular accident)  Neurochecks per routine.    Pulmonary  * Acute hypoxemic respiratory failure  Supplement oxygenation. Keep SAO2 >= 92%. BIPAP 10/5 QHS and PRN. Bronchodilators per orders. Wean BIPAP as tolerated.     Cardiac/Vascular  Elevated troponin  Likely demand ischemia:   Continue to monitor and trend.     ACC/AHA stage C congestive heart failure due to ischemic cardiomyopathy  Cardiology Consulted.  2D ECHO  Optimize CHF regimen.  Recommend referral to CHF clinic.  Preload and afterload reduction [FUROSEMIDE + HYDRALLAZINE + IMDUR].  Daily weighing.  Dietary salt restriction.    METOPROLOL ON HOLD DUE TO ACUTE DECOMPENSATION.       Aortic stenosis, mild  SERA = 1.6 cm2, AVAi = 0.75 cm2/m2, mean gradient = 8 mmHg.    Medical management.    [x]        Treatment of High Blood Pressure  (goal of < 140/90 mm Hg // < 130/80 mm Hg in patients with DM or CKD)  [x]        Beta-blocker therapy.      Moderate mitral regurgitation  Cardiology consulted. Await input.   Optimize CHF regimen.      Carotid stenosis  50% [facility].    Continue PLAVIX.     Hypertension associated with diabetes  Continue AMLODIPINE.    Renal/  Chronic kidney disease, stage IV (severe)    5/28/19 5/11/19 4/30/19 5/8/14   Chem Profile   Creatinine  2.7High   2.3High   2.4High    1.4    eGFR if   25Abnormal   30Abnormal   28.8Abnormal   57.2Abnormal     eGFR if non   22Abnormal   26Abnormal   24.9Abnormal   49.5Abnormal              Monitor BUN / Cr. Montor urine output. Monitor and replete electrolytes as needed.   Nephrology consultation.      ID  Monitor fever curve. panculture for temperatures greater than 101 degrees F. Source control: n/a    Oncology  Anemia, unspecified  Monitor hemogram. Transfuse as needed.    Endocrine  Hypoglycemia  Secondary to sulfonylurea.  Continue Octreotide.   SBGM per protocol.     Type II diabetes mellitus with renal manifestations  Hypoglycemia likely iatrogenic.     Discontinue GLIPIZIDE.  Hold Insulin.        Serum blood glucose monitoring.  Blood glucose target 100 - 180 mg/dl    GI  GERD (gastroesophageal reflux disease)  Continue PANTOPRAZOLE.      Critical Care Daily Checklist:    A: Awake: RASS Goal/Actual Goal:  N/A  Actual:  N/A   B: Spontaneous Breathing Trial Performed?  N/A   C: SAT & SBT Coordinated?  N/A                    D: Delirium: CAM-ICU  Negative   E: Early Mobility Performed? Yes   F: Feeding Goal:    Status:     Current Diet Order   Procedures    Diet consistent carbohydrate [facility]; Low Sodium,2gm     1500 ADA     Order Specific Question:   Indicate patient location for additional diet options:     Answer[facility]     Order Specific Question:   Additional Diet Options:     Answer:   Low Sodium,2gm      AS: Analgesia/Sedation PRN   T: Thromboembolic Prophylaxis LDUFH   H: HOB > 300 Yes   U: Stress Ulcer Prophylaxis (if needed) PANTOPRAZOLE   G: Glucose Control SBGM   B: Bowel Function  N/A   I: Indwelling Catheter (Lines & Romero) Necessity YES   D: De-escalation of Antimicrobials/Pharmacotherapies N/A    Plan for the day/ETD Continue Current: May transfer out of the ICU    Code Status:  Family/Goals of Care: DNR  Home wit hfamily     Critical Care Time: 90 minutes  Critical  secondary to Acte CHF, Acute hypoxemic respiratory failure       Critical care was time spent personally by me on the following activities: development of treatment plan with patient or surrogate and bedside caregivers, discussions with consultants, evaluation of patient's response to treatment, examination of patient, ordering and performing treatments and interventions, ordering and review of laboratory studies, ordering and review of radiographic studies, pulse oximetry, re-evaluation of patient's condition. This critical care time did not overlap with that of any other provider or involve time for any procedures.    Thank you for your consult. I will follow-up with patient. Please contact us if you have any additional questions.     Tyler Rice MD  Critical Care Medicine  [facility]

## 2019-05-28 NOTE — HPI
Patient is a 78-year-old male with history of acute on chronic congestive heart failure with valvular heart disease and ischemic cardiomyopathy.  Patient also has chronic kidney disease for at least 5 years.  Patient had CABG followed by multiple procedures which resulted in chronic kidney disease according to the wife.  Patient's baseline kidney function in 2018 was 25% with a creatinine of 2.3.  Patient's current admission also is acute pulmonary edema and acute congestive heart failure with a creatinine of 2.7.  His proteinuria has been ranging between 1.3 and 2.5 g per 24 hr.  Nephrology consultation is requested for evaluation and management of chronic kidney disease stage 4.  Patient has responded very well to the diuretics given.  Seems to have much improved shortness of breath and PND orthopnea and all the other symptoms for which she came foreign admitted for.  Denies any urinary obstructive symptoms.  Denies any BPH symptoms.  Denies any fevers and chills.  Denies any flank pain. Patient seen and evaluated in ICU at the bedside for management of chronic kidney disease stage 4.

## 2019-05-28 NOTE — PLAN OF CARE
Met with patient and family initial assessment completed. Patient is independent with adls and iadls with his assistive device.Patient lives with his spouse and stated that she will provide his transportation upon discharge. CM will follow.  Patient denies any post hospital needs or services at this time.  Updated white board with 's name and number. Transitional Care Folder, Discharge Planning Begins on Admission pamphlet, [facility] Bedside Delivery pamphlet, Advance Directive information given to patient along with the contact information given.Instructed patient or family to call with any questions or concerns.    Discussed accessing the [employer] via the [employer] Instant Activation. Patient declined.          [facility] [address] [place] [address] [id number]  [address] [facility] [address]  Phone: [phone] Fax: [phone]    Raza Uribe MD  Payor: [employer] MANAGED MEDICARE / Plan: [employer] MEDICARE HMO / Product Type: Capitation /             05/28/19 1539   Discharge Assessment   Assessment Type Discharge Planning Assessment   Confirmed/corrected address and phone number on facesheet? Yes   Assessment information obtained from? Patient;Caregiver;Medical Record   Expected Length of Stay (days)   (tbd)   Communicated expected length of stay with patient/caregiver yes   Prior to hospitilization cognitive status: Alert/Oriented   Prior to hospitalization functional status: Independent   Current cognitive status: Alert/Oriented   Current Functional Status: Assistive Equipment;Independent   Facility Arrived From: home   Lives With spouse   Able to Return to Prior Arrangements yes   Is patient able to care for self after discharge? Unable to determine at this time (comments)   Who are your caregiver(s) and their phone number(s)? Yodit Ellington ( spouse ) [phone]   Patient's perception of discharge disposition home or selfcare;home health   Readmission Within the Last 30  Days no previous admission in last 30 days   Patient currently being followed by outpatient case management? No   Patient currently receives any other outside agency services? No   Equipment Currently Used at Home walker, rolling   Do you have any problems affording any of your prescribed medications? No   Is the patient taking medications as prescribed? yes   Does the patient have transportation home? Yes   Transportation Anticipated family or friend will provide   Does the patient receive services at the [facility] Clinic? No   Discharge Plan A Home;Home with family   Discharge Plan B Home;Home with family;Home Health   DME Needed Upon Discharge  glucometer   Patient/Family in Agreement with Plan yes

## 2019-05-28 NOTE — PLAN OF CARE
Problem: Adult Inpatient Plan of Care  Goal: Plan of Care Review  Outcome: Ongoing (interventions implemented as appropriate)  Pt admitted to ICU 0735 on RA; denies SOB or CP; VSS; now tele status; BG 70s for most part of day; now [phone]; wife at bedside; NADN.

## 2019-05-28 NOTE — TELEPHONE ENCOUNTER
Called to schedule with Dr. Ron. Notice patient also have an appointment with Dr. Escalante. Need to find out which appointment patient will keep.

## 2019-05-28 NOTE — SUBJECTIVE & OBJECTIVE
Past Medical History:   Diagnosis Date    [facility]     [facility]% [facility]    Chronic kidney disease, stage IV (severe)     Congestive heart failure, NYHA class 3, chronic, systolic     Coronary artery disease     Diabetes mellitus with no complication     GERD (gastroesophageal reflux disease)     History of CVA (cerebrovascular accident) 11/2018    MRI  reveals right parietal lobe infarct    Hx of CABG 5/28/2019    Hyperlipidemia associated with type 2 diabetes mellitus     Hypertension associated with diabetes     Mild AI (aortic insufficiency)     Moderate mitral regurgitation     Type II diabetes mellitus with renal manifestations        Past Surgical History:   Procedure Laterality Date    CORONARY ARTERY BYPASS GRAFT      CORONARY STENT PLACEMENT      KIDNEY STONE SURGERY         Review of patient's allergies indicates:   Allergen Reactions    Influenza virus vaccines Other (See Comments)     Red streaks up arm    Penicillins Swelling       No current facility-administered medications on file prior to encounter.      Current Outpatient Medications on File Prior to Encounter   Medication Sig    amlodipine (NORVASC) 10 MG tablet Take 10 mg by mouth once daily.    aspirin 325 MG tablet Take 81 mg by mouth once daily.     clopidogrel (PLAVIX) 75 mg tablet Take 1 tablet (75 mg total) by mouth once daily.    escitalopram (LEXAPRO) 10 MG tablet Take 1 tablet (10 mg total) by mouth once daily.    furosemide (LASIX) 40 MG tablet Take 40 mg by mouth once daily.    hydrALAZINE (APRESOLINE) 100 MG tablet Take 1 tablet (100 mg total) by mouth 2 (two) times daily.    insulin glargine (LANTUS) 100 unit/mL injection Inject 50 Units into the skin every evening.     isosorbide dinitrate (ISORDIL) 20 MG tablet Take 1 tablet (20 mg total) by mouth 3 (three) times daily.    metoprolol succinate (TOPROL-XL) 50 MG 24 hr tablet Take 50 mg by mouth once daily.    prazosin  (MINIPRESS) 2 MG Cap Take 1 mg by mouth 2 (two) times daily.    prednisoLONE acetate (PRED FORTE) 1 % DrpS     rosuvastatin (CRESTOR) 40 MG Tab Take 1 tablet (40 mg total) by mouth every evening.    [DISCONTINUED] glipiZIDE (GLUCOTROL) 10 MG tablet Take 1 tablet (10 mg total) by mouth 2 (two) times daily before meals.    ACCU-CHEK MSAHA PLUS Strp     omeprazole (PRILOSEC) 20 MG capsule Take 20 mg by mouth once daily.    ondansetron (ZOFRAN-ODT) 4 MG TbDL Take 1 tablet (4 mg total) by mouth every 8 (eight) hours as needed (prn nausea/vomiting).    [DISCONTINUED] sertraline (ZOLOFT) 50 MG tablet Take 25 mg by mouth.     Family History     None        Tobacco Use    Smoking status: Former Smoker    Smokeless tobacco: Current User     Types: Snuff   Substance and Sexual Activity    Alcohol use: No    Drug use: No    Sexual activity: Not on file     Review of Systems   Constitutional: Negative.  Negative for chills and fever.   HENT: Negative.  Negative for congestion and sore throat.    Eyes: Negative.  Negative for visual disturbance.   Respiratory: Positive for cough and shortness of breath. Negative for wheezing.    Cardiovascular: Positive for leg swelling. Negative for chest pain.   Gastrointestinal: Negative for abdominal pain, diarrhea, nausea and vomiting.   Endocrine: Negative.    Genitourinary: Negative.    Musculoskeletal: Negative.  Negative for myalgias and neck stiffness.   Skin: Negative.  Negative for color change and pallor.   Allergic/Immunologic: Negative.    Neurological: Positive for weakness.   Hematological: Negative.    Psychiatric/Behavioral: Negative.    All other systems reviewed and are negative.    Objective:     Vital Signs (Most Recent):  Temp: 98.5 °F (36.9 °C) (05/28/19 1500)  Pulse: 70 (05/28/19 1700)  Resp: 18 (05/28/19 1700)  BP: (!) 169/70 (05/28/19 1700)  SpO2: 96 % (05/28/19 1700) Vital Signs (24h Range):  Temp:  [97.6 °F (36.4 °C)-98.5 °F (36.9 °C)] 98.5 °F (36.9  °C)  Pulse:  [54-78] 70  Resp:  [10-22] 18  SpO2:  [89 %-97 %] 96 %  BP: (140-209)/(35-78) 169/70     Weight: 99.3 kg (219 lb)  Body mass index is 31.42 kg/m².    Physical Exam   Constitutional: He is oriented to person, place, and time. He appears well-developed and well-nourished. No distress.   HENT:   Head: Normocephalic and atraumatic.   Mouth/Throat: Oropharynx is clear and moist.   Eyes: Pupils are equal, round, and reactive to light. Conjunctivae and EOM are normal.   Neck: No JVD present. No thyromegaly present.   Cardiovascular: Normal rate, regular rhythm and normal heart sounds. Exam reveals no gallop and no friction rub.   No murmur heard.  Pulmonary/Chest: Effort normal. No respiratory distress. He has no wheezes. He has no rales.   Mild crackles bilaterally   Abdominal: Soft. Bowel sounds are normal. He exhibits no distension. There is no tenderness. There is no rebound and no guarding.   Musculoskeletal: Normal range of motion. He exhibits edema. He exhibits no tenderness.   Lymphadenopathy:     He has no cervical adenopathy.   Neurological: He is alert and oriented to person, place, and time. He has normal reflexes. He displays normal reflexes. No cranial nerve deficit.   Skin: Skin is warm and dry. No rash noted. He is not diaphoretic. No erythema.   Psychiatric: He has a normal mood and affect. His behavior is normal. Judgment and thought content normal.         CRANIAL NERVES     CN III, IV, VI   Pupils are equal, round, and reactive to light.  Extraocular motions are normal.        Significant Labs: All pertinent labs within the past 24 hours have been reviewed.    Significant Imaging: I have reviewed all pertinent imaging results/findings within the past 24 hours.

## 2019-05-28 NOTE — SUBJECTIVE & OBJECTIVE
Past Medical History:   Diagnosis Date    [facility]     [facility]% [facility]    Chronic kidney disease, stage IV (severe)     Congestive heart failure, NYHA class 3, chronic, systolic     Coronary artery disease     Diabetes mellitus with no complication     GERD (gastroesophageal reflux disease)     History of CVA (cerebrovascular accident) 11/2018    MRI  reveals right parietal lobe infarct    Hyperlipidemia associated with type 2 diabetes mellitus     Hypertension associated with diabetes     Mild AI (aortic insufficiency)     Moderate mitral regurgitation     Type II diabetes mellitus with renal manifestations        Past Surgical History:   Procedure Laterality Date    CORONARY ARTERY BYPASS GRAFT      CORONARY STENT PLACEMENT      KIDNEY STONE SURGERY         Review of patient's allergies indicates:   Allergen Reactions    Influenza virus vaccines Other (See Comments)     Red streaks up arm    Penicillins Swelling       Scheduled Meds:   amLODIPine  10 mg Oral Daily    heparin (porcine)  5,000 Units Subcutaneous Q8H    hydrALAZINE  100 mg Oral Q12H    octreotide  50 mcg Subcutaneous Q6H    pantoprazole  40 mg Oral Daily     Continuous Infusions:  PRN Meds:.dextrose 50%, dextrose 50%, glucagon (human recombinant), glucose, glucose       Family History     None        Tobacco Use    Smoking status: Former Smoker    Smokeless tobacco: Current User     Types: Snuff   Substance and Sexual Activity    Alcohol use: No    Drug use: No    Sexual activity: Not on file         Review of Systems   Constitutional: Positive for fatigue. Negative for chills and fever.   HENT: Negative for congestion, facial swelling and sneezing.    Eyes: Negative for discharge and itching.   Respiratory: Positive for shortness of breath.    Gastrointestinal: Negative for abdominal pain, blood in stool and diarrhea.   Endocrine: Negative for cold intolerance and heat intolerance.    Genitourinary: Negative for difficulty urinating and flank pain.   Musculoskeletal: Negative for joint swelling, myalgias and neck pain.   Allergic/Immunologic: Negative for food allergies.   Neurological: Negative for seizures and speech difficulty.   Psychiatric/Behavioral: Negative for behavioral problems, confusion and decreased concentration.   All other systems reviewed and are negative.    Objective:     Vital Signs (Most Recent):  Temp: 97.6 °F (36.4 °C) (05/28/19 0701)  Pulse: 61 (05/28/19 0701)  Resp: 13 (05/28/19 0701)  BP: (!) 202/76 (05/28/19 0851)  SpO2: (!) 94 % (05/28/19 0739) Vital Signs (24h Range):  Temp:  [97.6 °F (36.4 °C)-97.8 °F (36.6 °C)] 97.6 °F (36.4 °C)  Pulse:  [54-72] 61  Resp:  [12-22] 13  SpO2:  [91 %-96 %] 94 %  BP: (161-202)/(71-78) 202/76     Weight: 99.3 kg (219 lb)  Body mass index is 31.42 kg/m².    No intake or output data in the 24 hours ending 05/28/19 0930    Physical Exam   Constitutional: He is oriented to person, place, and time. He appears well-developed and well-nourished.   HENT:   Head: Normocephalic and atraumatic.   Eyes: Pupils are equal, round, and reactive to light. EOM are normal.   Neck: Normal range of motion. Neck supple.   Cardiovascular: Normal rate and regular rhythm.   Pulmonary/Chest: No stridor. No respiratory distress. He has no wheezes.   Abdominal: Soft. Bowel sounds are normal.   Musculoskeletal: Normal range of motion. He exhibits no edema.   Neurological: He is alert and oriented to person, place, and time.   Skin: Skin is warm and dry. Capillary refill takes less than 2 seconds.       Vents:  Oxygen Concentration (%): 21 (05/28/19 0700)    Lines/Drains/Airways     Peripheral Intravenous Line                 Peripheral IV - Single Lumen 05/28/19 0432 20 G Left Antecubital less than 1 day                Significant Labs:    Results for orders placed or performed during the hospital encounter of 05/28/19   CBC auto differential   Result Value Ref  Range    WBC 9.19 3.90 - 12.70 K/uL    RBC 4.25 (L) 4.60 - 6.20 M/uL    Hemoglobin 12.5 (L) 14.0 - 18.0 g/dL    Hematocrit 37.8 (L) 40.0 - 54.0 %    Mean Corpuscular Volume 89 82 - 98 fL    Mean Corpuscular Hemoglobin 29.4 27.0 - 31.0 pg    Mean Corpuscular Hemoglobin Conc 33.1 32.0 - 36.0 g/dL    RDW 15.9 (H) 11.5 - 14.5 %    Platelets 332 150 - 350 K/uL    MPV 9.4 9.2 - 12.9 fL    Gran # (ANC) 6.8 1.8 - 7.7 K/uL    Lymph # 1.1 1.0 - 4.8 K/uL    Mono # 1.2 (H) 0.3 - 1.0 K/uL    Eos # 0.1 0.0 - 0.5 K/uL    Baso # 0.01 0.00 - 0.20 K/uL    Gran% 73.5 (H) 38.0 - 73.0 %    Lymph% 12.1 (L) 18.0 - 48.0 %    Mono% 12.9 4.0 - 15.0 %    Eosinophil% 1.4 0.0 - 8.0 %    Basophil% 0.1 0.0 - 1.9 %    Differential Method Automated    Comprehensive metabolic panel   Result Value Ref Range    Sodium 144 136 - 145 mmol/L    Potassium 3.6 3.5 - 5.1 mmol/L    Chloride 111 (H) 95 - 110 mmol/L    CO2 23 23 - 29 mmol/L    Glucose 41 (LL) 70 - 110 mg/dL    BUN, Bld 22 8 - 23 mg/dL    Creatinine 2.7 (H) 0.5 - 1.4 mg/dL    Calcium 9.0 8.7 - 10.5 mg/dL    Total Protein 6.6 6.0 - 8.4 g/dL    Albumin 3.2 (L) 3.5 - 5.2 g/dL    Total Bilirubin 0.4 0.1 - 1.0 mg/dL    Alkaline Phosphatase 78 55 - 135 U/L    AST 11 10 - 40 U/L    ALT 13 10 - 44 U/L    Anion Gap 10 8 - 16 mmol/L    eGFR if African American 25 (A) >60 mL/min/1.73 m^2    eGFR if non African American 22 (A) >60 mL/min/1.73 m^2   Brain natriuretic peptide   Result Value Ref Range    BNP 1,406 (H) 0 - 99 pg/mL   Troponin I   Result Value Ref Range    Troponin I 0.064 (H) 0.000 - 0.026 ng/mL   POCT glucose   Result Value Ref Range    POCT Glucose 39 (LL) 70 - 110 mg/dL   ISTAT PROCEDURE   Result Value Ref Range    POC PH 7.382 7.35 - 7.45    POC PCO2 39.9 35 - 45 mmHg    POC PO2 83 80 - 100 mmHg    POC HCO3 23.7 (L) 24 - 28 mmol/L    POC BE -1 -2 to 2 mmol/L    POC SATURATED O2 96 95 - 100 %    Rate 14     Sample ARTERIAL     Site LR     Allens Test Pass     DelSys CPAP/BiPAP     Mode  BiPAP     FiO2 30     IP 12     EP 6    POCT glucose   Result Value Ref Range    POCT Glucose 162 (H) 70 - 110 mg/dL   POCT glucose   Result Value Ref Range    POCT Glucose 101 70 - 110 mg/dL   POCT glucose   Result Value Ref Range    POCT Glucose 75 70 - 110 mg/dL   POCT glucose   Result Value Ref Range    POCT Glucose 70 70 - 110 mg/dL          Significant Imaging:     Imaging Results          X-Ray Chest AP Portable (Final result)  Result time 05/28/19 08:35:09    Final result by MELISSA Stack Sr., MD (05/28/19 08:35:09)                 Impression:      1. The borders of the heart are not well seen. There is a mild amount of interstitial opacities in both lungs with Kerley B-lines visualized bilaterally.  This represents a slight interval improvement in the appearance of the lungs.  This is characteristic of pulmonary edema.  2. The right costophrenic angle is not completely included on the film. There is blunting of the right costophrenic angle.  This is characteristic of a tiny pleural effusion.  3. The left costophrenic angle is not well seen.  4. The current examination is limited secondary to patient rotation to the left.  5. There is a moderate amount of atherosclerosis.  6. Surgical changes  .      Electronically signed by: Jose L Stack MD  Date:    05/28/2019  Time:    08:35             Narrative:    EXAMINATION:  XR CHEST AP PORTABLE    CLINICAL HISTORY:  sob;    COMPARISON:  05/11/2019    FINDINGS:  The current examination is limited secondary to patient rotation to the left.  There are multiple sternotomy wires in place.  There are multiple surgical clips projected over the mediastinum.  The borders of the heart are not well seen.  There is a moderate amount of atherosclerosis.  There is a mild amount of interstitial opacities in both lungs with Kerley B-lines visualized bilaterally.  This represents a slight interval improvement in the appearance of the lungs.  The right costophrenic angle is  not completely included on the film.  There is blunting of the right costophrenic angle.  The left costophrenic angle is not well seen.  There is no pneumothorax.

## 2019-05-28 NOTE — PROGRESS NOTES
Patient noted resting well on NIPPV settings 10/5 and 21% FIO2. No resp distress noted at this time. Will continue to monitor.

## 2019-05-28 NOTE — ASSESSMENT & PLAN NOTE
Hypoglycemia likely iatrogenic.     Discontinue GLIPIZIDE.  Hold Insulin.        Serum blood glucose monitoring.  Blood glucose target 100 - 180 mg/dl

## 2019-05-28 NOTE — SUBJECTIVE & OBJECTIVE
Past Medical History:   Diagnosis Date    [facility]id [facility]     [facility]% [facility]    Chronic kidney disease, stage IV (severe)     Congestive heart failure, NYHA class 3, chronic, systolic     Coronary artery disease     Diabetes mellitus with no complication     GERD (gastroesophageal reflux disease)     History of CVA (cerebrovascular accident) 11/2018    MRI  reveals right parietal lobe infarct    Hx of CABG 5/28/2019    Hyperlipidemia associated with type 2 diabetes mellitus     Hypertension associated with diabetes     Mild AI (aortic insufficiency)     Moderate mitral regurgitation     Type II diabetes mellitus with renal manifestations        Past Surgical History:   Procedure Laterality Date    CORONARY ARTERY BYPASS GRAFT      CORONARY STENT PLACEMENT      KIDNEY STONE SURGERY         Review of patient's allergies indicates:   Allergen Reactions    Influenza virus vaccines Other (See Comments)     Red streaks up arm    Penicillins Swelling     Current Facility-Administered Medications   Medication Frequency    amLODIPine tablet 10 mg Daily    clopidogrel tablet 75 mg Daily    dextrose 50% injection 12.5 g PRN    dextrose 50% injection 25 g PRN    glucagon (human recombinant) injection 1 mg PRN    glucose chewable tablet 16 g PRN    glucose chewable tablet 24 g PRN    heparin (porcine) injection 5,000 Units Q8H    hydrALAZINE tablet 100 mg Q12H    isosorbide mononitrate 24 hr tablet 30 mg Daily    octreotide injection 50 mcg Q6H    pantoprazole EC tablet 40 mg Daily     Family History     None        Tobacco Use    Smoking status: Former Smoker    Smokeless tobacco: Current User     Types: Snuff   Substance and Sexual Activity    Alcohol use: No    Drug use: No    Sexual activity: Not on file     Review of Systems   Constitutional: Positive for activity change, appetite change, diaphoresis, fatigue and unexpected weight change. Negative for chills  and fever.   HENT: Negative.  Negative for congestion and trouble swallowing.    Eyes: Negative.    Respiratory: Positive for cough, chest tightness, shortness of breath and wheezing.    Cardiovascular: Positive for leg swelling. Negative for chest pain and palpitations.   Gastrointestinal: Negative.  Negative for abdominal distention, abdominal pain, nausea and vomiting.   Genitourinary: Negative.  Negative for decreased urine volume, difficulty urinating, dysuria, enuresis, flank pain, frequency, hematuria, penile swelling, scrotal swelling and urgency.   Musculoskeletal: Negative.  Negative for arthralgias, back pain, joint swelling and neck pain.   Skin: Negative for rash.   Neurological: Negative.  Negative for tremors, seizures and headaches.   Psychiatric/Behavioral: Negative.  Negative for confusion and sleep disturbance. The patient is not nervous/anxious.    All other systems reviewed and are negative.    Objective:     Vital Signs (Most Recent):  Temp: 98 °F (36.7 °C) (05/28/19 1100)  Pulse: 67 (05/28/19 1230)  Resp: 16 (05/28/19 1230)  BP: (!) 151/47 (05/28/19 1230)  SpO2: 96 % (05/28/19 1230)  O2 Device (Oxygen Therapy): nasal cannula (05/28/19 1100) Vital Signs (24h Range):  Temp:  [97.6 °F (36.4 °C)-98.1 °F (36.7 °C)] 98 °F (36.7 °C)  Pulse:  [54-75] 67  Resp:  [10-22] 16  SpO2:  [89 %-97 %] 96 %  BP: (140-209)/(35-78) 151/47     Weight: 99.3 kg (219 lb) (05/28/19 0718)  Body mass index is 31.42 kg/m².  Body surface area is 2.21 meters squared.    No intake/output data recorded.    Physical Exam   Constitutional: He appears well-developed and well-nourished. No distress.   HENT:   Head: Normocephalic.   Eyes: Pupils are equal, round, and reactive to light. EOM are normal.   Neck: Normal range of motion. Neck supple. No JVD present. No thyromegaly present.   Cardiovascular: S1 normal and S2 normal. PMI is not displaced. Exam reveals no gallop and no friction rub.   No murmur heard.  Pulses:        Carotid pulses are 1+ on the right side, and 1+ on the left side.       Radial pulses are 1+ on the right side, and 1+ on the left side.        Femoral pulses are 1+ on the right side, and 1+ on the left side.       Popliteal pulses are 1+ on the right side, and 1+ on the left side.        Dorsalis pedis pulses are 1+ on the right side, and 1+ on the left side.        Posterior tibial pulses are 1+ on the right side, and 1+ on the left side.   PMI is displaced laterally, right ventricular heave loud P2 consistent with signs of pulmonary hypertension.   Pulmonary/Chest: He is in respiratory distress. He has decreased breath sounds in the right lower field and the left middle field. He has wheezes in the right lower field and the left lower field. He has rhonchi in the right lower field and the left lower field. He has rales in the right lower field and the left lower field. He exhibits no tenderness.   Abdominal: He exhibits no distension and no mass. There is no hepatosplenomegaly. There is no tenderness. There is no rebound and no CVA tenderness. No hernia.   Musculoskeletal: Normal range of motion. He exhibits edema. He exhibits no tenderness.   Lymphadenopathy:     He has no cervical adenopathy.   Neurological: He is alert. He has normal reflexes. He is not disoriented. He displays normal reflexes. No cranial nerve deficit. He exhibits normal muscle tone. Coordination normal.   Skin: Skin is warm and dry. Capillary refill takes 2 to 3 seconds. No rash noted. No erythema.   Nursing note and vitals reviewed.      Significant Labs:  All labs within the past 24 hours have been reviewed.    Significant Imaging:  Labs: Reviewed  ECG: Reviewed  X-Ray: Reviewed  Echo: Reviewed

## 2019-05-28 NOTE — CONSULTS
[facility] -   Nephrology  Consult Note        Patient Name: Carlitos Ellington  MRN: [id number]  Admission Date: 5/28/2019  Hospital Length of Stay: 0 days  Attending Provider: Car Odonnell MD   Primary Care Physician: Raza Uribe MD  Principal Problem:Acute hypoxemic respiratory failure    Consults  Subjective:     HPI: Patient is a 78-year-old male with history of acute on chronic congestive heart failure with valvular heart disease and ischemic cardiomyopathy.  Patient also has chronic kidney disease for at least 5 years.  Patient had CABG followed by multiple procedures which resulted in chronic kidney disease according to the wife.  Patient's baseline kidney function in 2018 was 25% with a creatinine of 2.3.  Patient's current admission also is acute pulmonary edema and acute congestive heart failure with a creatinine of 2.7.  His proteinuria has been ranging between 1.3 and 2.5 g per 24 hr.  Nephrology consultation is requested for evaluation and management of chronic kidney disease stage 4.  Patient has responded very well to the diuretics given.  Seems to have much improved shortness of breath and PND orthopnea and all the other symptoms for which she came foreign admitted for.  Denies any urinary obstructive symptoms.  Denies any BPH symptoms.  Denies any fevers and chills.  Denies any flank pain. Patient seen and evaluated in ICU at the bedside for management of chronic kidney disease stage 4.    Past Medical History:   Diagnosis Date    [facility]otid [facility]     [facility]    Chronic kidney disease, stage IV (severe)     Congestive heart failure, NYHA class 3, chronic, systolic     Coronary artery disease     Diabetes mellitus with no complication     GERD (gastroesophageal reflux disease)     History of CVA (cerebrovascular accident) 11/2018    MRI  reveals right parietal lobe infarct    Hx of CABG 5/28/2019    Hyperlipidemia associated with  type 2 diabetes mellitus     Hypertension associated with diabetes     Mild AI (aortic insufficiency)     Moderate mitral regurgitation     Type II diabetes mellitus with renal manifestations        Past Surgical History:   Procedure Laterality Date    CORONARY ARTERY BYPASS GRAFT      CORONARY STENT PLACEMENT      KIDNEY STONE SURGERY         Review of patient's allergies indicates:   Allergen Reactions    Influenza virus vaccines Other (See Comments)     Red streaks up arm    Penicillins Swelling     Current Facility-Administered Medications   Medication Frequency    amLODIPine tablet 10 mg Daily    clopidogrel tablet 75 mg Daily    dextrose 50% injection 12.5 g PRN    dextrose 50% injection 25 g PRN    glucagon (human recombinant) injection 1 mg PRN    glucose chewable tablet 16 g PRN    glucose chewable tablet 24 g PRN    heparin (porcine) injection 5,000 Units Q8H    hydrALAZINE tablet 100 mg Q12H    isosorbide mononitrate 24 hr tablet 30 mg Daily    octreotide injection 50 mcg Q6H    pantoprazole EC tablet 40 mg Daily     Family History     None        Tobacco Use    Smoking status: Former Smoker    Smokeless tobacco: Current User     Types: Snuff   Substance and Sexual Activity    Alcohol use: No    Drug use: No    Sexual activity: Not on file     Review of Systems   Constitutional: Positive for activity change, appetite change, diaphoresis, fatigue and unexpected weight change. Negative for chills and fever.   HENT: Negative.  Negative for congestion and trouble swallowing.    Eyes: Negative.    Respiratory: Positive for cough, chest tightness, shortness of breath and wheezing.    Cardiovascular: Positive for leg swelling. Negative for chest pain and palpitations.   Gastrointestinal: Negative.  Negative for abdominal distention, abdominal pain, nausea and vomiting.   Genitourinary: Negative.  Negative for decreased urine volume, difficulty urinating, dysuria, enuresis, flank pain,  frequency, hematuria, penile swelling, scrotal swelling and urgency.   Musculoskeletal: Negative.  Negative for arthralgias, back pain, joint swelling and neck pain.   Skin: Negative for rash.   Neurological: Negative.  Negative for tremors, seizures and headaches.   Psychiatric/Behavioral: Negative.  Negative for confusion and sleep disturbance. The patient is not nervous/anxious.    All other systems reviewed and are negative.    Objective:     Vital Signs (Most Recent):  Temp: 98 °F (36.7 °C) (05/28/19 1100)  Pulse: 67 (05/28/19 1230)  Resp: 16 (05/28/19 1230)  BP: (!) 151/47 (05/28/19 1230)  SpO2: 96 % (05/28/19 1230)  O2 Device (Oxygen Therapy): nasal cannula (05/28/19 1100) Vital Signs (24h Range):  Temp:  [97.6 °F (36.4 °C)-98.1 °F (36.7 °C)] 98 °F (36.7 °C)  Pulse:  [54-75] 67  Resp:  [10-22] 16  SpO2:  [89 %-97 %] 96 %  BP: (140-209)/(35-78) 151/47     Weight: 99.3 kg (219 lb) (05/28/19 0718)  Body mass index is 31.42 kg/m².  Body surface area is 2.21 meters squared.    No intake/output data recorded.    Physical Exam   Constitutional: He appears well-developed and well-nourished. No distress.   HENT:   Head: Normocephalic.   Eyes: Pupils are equal, round, and reactive to light. EOM are normal.   Neck: Normal range of motion. Neck supple. No JVD present. No thyromegaly present.   Cardiovascular: S1 normal and S2 normal. PMI is not displaced. Exam reveals no gallop and no friction rub.   No murmur heard.  Pulses:       Carotid pulses are 1+ on the right side, and 1+ on the left side.       Radial pulses are 1+ on the right side, and 1+ on the left side.        Femoral pulses are 1+ on the right side, and 1+ on the left side.       Popliteal pulses are 1+ on the right side, and 1+ on the left side.        Dorsalis pedis pulses are 1+ on the right side, and 1+ on the left side.        Posterior tibial pulses are 1+ on the right side, and 1+ on the left side.   PMI is displaced laterally, right ventricular  heave loud P2 consistent with signs of pulmonary hypertension.   Pulmonary/Chest: He is in respiratory distress. He has decreased breath sounds in the right lower field and the left middle field. He has wheezes in the right lower field and the left lower field. He has rhonchi in the right lower field and the left lower field. He has rales in the right lower field and the left lower field. He exhibits no tenderness.   Abdominal: He exhibits no distension and no mass. There is no hepatosplenomegaly. There is no tenderness. There is no rebound and no CVA tenderness. No hernia.   Musculoskeletal: Normal range of motion. He exhibits edema. He exhibits no tenderness.   Lymphadenopathy:     He has no cervical adenopathy.   Neurological: He is alert. He has normal reflexes. He is not disoriented. He displays normal reflexes. No cranial nerve deficit. He exhibits normal muscle tone. Coordination normal.   Skin: Skin is warm and dry. Capillary refill takes 2 to 3 seconds. No rash noted. No erythema.   Nursing note and vitals reviewed.      Significant Labs:  All labs within the past 24 hours have been reviewed.    Significant Imaging:  Labs: Reviewed  ECG: Reviewed  X-Ray: Reviewed  Echo: Reviewed    Assessment/Plan:     Chronic kidney disease, stage IV (severe)  Patient has multiorgan failure including acute on chronic congestive heart failure with ischemic cardiomyopathy.  Also has valvular heart disease.  Also has type 2 diabetes and hypertension.  Patient also has chronic kidney disease which is getting worse with time.  Approximate GFR is about 20-25%.  Will check cystatin C for accurate GFR.  Will follow the patient as an outpatient.  It is my understanding that the patient is comfort care only and is DNR.    Will arrange a routine follow-up in the clinic in 1 month        Thank you for your consult.     Mauri Ron MD   Nephrology  [facility] -

## 2019-05-28 NOTE — CONSULTS
[facility] -   Cardiology  Consult Note    Patient Name: Carlitos Ellington  MRN: [id number]  Admission Date: 5/28/2019  Hospital Length of Stay: 0 days  Code Status: DNR   Attending Provider: Car Odonnell MD   Consulting Provider: Yandel Raman MD  Primary Care Physician: Raza Uribe MD  Principal Problem:Acute hypoxemic respiratory failure    Patient information was obtained from patient, spouse/SO, past medical records and ER records.     Inpatient consult to Cardiology  Consult performed by: Yandel Raman MD  Consult ordered by: Car Odonnell MD  Reason for consult: CHF        Subjective:     Chief Complaint:  DYSPNEA     HPI:   Pt admitted with CHF exacerbation.  Pt c/o increased dyspnea on exertion and at rest over the last several days (wife states has been longer duration).  Denies chest pain sxs.    He has h/o 4 v CABG, followed by outside Cardiology group.  Echo 4/19 showed EF 25%, mild AS, mild-mod MR.  Admit labs shows troponin leak 0.064, BNP 1406 and renal failure with creatinine 2.7  He feels better at time of consult after receiving IV lasix.  Has h/o multiple CVA.     Past Medical History:   Diagnosis Date    [facility]otid [facility]     [facility]% [facility]    Chronic kidney disease, stage IV (severe)     Congestive heart failure, NYHA class 3, chronic, systolic     Coronary artery disease     Diabetes mellitus with no complication     GERD (gastroesophageal reflux disease)     History of CVA (cerebrovascular accident) 11/2018    MRI  reveals right parietal lobe infarct    Hx of CABG 5/28/2019    Hyperlipidemia associated with type 2 diabetes mellitus     Hypertension associated with diabetes     Mild AI (aortic insufficiency)     Moderate mitral regurgitation     Type II diabetes mellitus with renal manifestations        Past Surgical History:   Procedure Laterality Date    CORONARY ARTERY BYPASS GRAFT      CORONARY STENT PLACEMENT       KIDNEY STONE SURGERY         Review of patient's allergies indicates:   Allergen Reactions    Influenza virus vaccines Other (See Comments)     Red streaks up arm    Penicillins Swelling       No current facility-administered medications on file prior to encounter.      Current Outpatient Medications on File Prior to Encounter   Medication Sig    amlodipine (NORVASC) 10 MG tablet Take 10 mg by mouth once daily.    aspirin 325 MG tablet Take 81 mg by mouth once daily.     clopidogrel (PLAVIX) 75 mg tablet Take 1 tablet (75 mg total) by mouth once daily.    escitalopram (LEXAPRO) 10 MG tablet Take 1 tablet (10 mg total) by mouth once daily.    furosemide (LASIX) 40 MG tablet Take 40 mg by mouth once daily.    hydrALAZINE (APRESOLINE) 100 MG tablet Take 1 tablet (100 mg total) by mouth 2 (two) times daily.    insulin glargine (LANTUS) 100 unit/mL injection Inject 50 Units into the skin every evening.     isosorbide dinitrate (ISORDIL) 20 MG tablet Take 1 tablet (20 mg total) by mouth 3 (three) times daily.    metoprolol succinate (TOPROL-XL) 50 MG 24 hr tablet Take 50 mg by mouth once daily.    prazosin (MINIPRESS) 2 MG Cap Take 1 mg by mouth 2 (two) times daily.    prednisoLONE acetate (PRED FORTE) 1 % DrpS     rosuvastatin (CRESTOR) 40 MG Tab Take 1 tablet (40 mg total) by mouth every evening.    [DISCONTINUED] glipiZIDE (GLUCOTROL) 10 MG tablet Take 1 tablet (10 mg total) by mouth 2 (two) times daily before meals.    ACCU-CHEK MASHA PLUS Strp     omeprazole (PRILOSEC) 20 MG capsule Take 20 mg by mouth once daily.    ondansetron (ZOFRAN-ODT) 4 MG TbDL Take 1 tablet (4 mg total) by mouth every 8 (eight) hours as needed (prn nausea/vomiting).    [DISCONTINUED] sertraline (ZOLOFT) 50 MG tablet Take 25 mg by mouth.     Family History     None        Tobacco Use    Smoking status: Former Smoker    Smokeless tobacco: Current User     Types: Snuff   Substance and Sexual Activity    Alcohol use: No     Drug use: No    Sexual activity: Not on file     Review of Systems   Constitution: Positive for malaise/fatigue.   HENT: Negative.    Eyes: Negative.    Cardiovascular: Positive for dyspnea on exertion and leg swelling.   Respiratory: Positive for shortness of breath.    Endocrine: Negative.    Hematologic/Lymphatic: Negative.    Skin: Negative.    Musculoskeletal: Negative.    Gastrointestinal: Negative.    Genitourinary: Negative.    Neurological: Negative.    Psychiatric/Behavioral: Negative.    Allergic/Immunologic: Negative.      Objective:     Vital Signs (Most Recent):  Temp: (P) 98 °F (36.7 °C) (05/28/19 1100)  Pulse: 63 (05/28/19 0945)  Resp: (P) 15 (05/28/19 1000)  BP: (!) 160/49 (05/28/19 0945)  SpO2: (!) 93 % (05/28/19 0945) Vital Signs (24h Range):  Temp:  [97.6 °F (36.4 °C)-98.1 °F (36.7 °C)] (P) 98 °F (36.7 °C)  Pulse:  [54-72] 63  Resp:  [12-22] (P) 15  SpO2:  [91 %-97 %] 93 %  BP: (154-209)/(35-78) 160/49     Weight: 99.3 kg (219 lb)  Body mass index is 31.42 kg/m².    SpO2: (!) 93 %  O2 Device (Oxygen Therapy): nasal cannula    No intake or output data in the 24 hours ending 05/28/19 1247    Lines/Drains/Airways     Peripheral Intravenous Line                 Peripheral IV - Single Lumen 05/28/19 0432 20 G Left Antecubital less than 1 day                Physical Exam   Constitutional: He is oriented to person, place, and time. He appears well-developed and well-nourished.   HENT:   Head: Normocephalic.   Neck: Normal range of motion. Neck supple. Normal carotid pulses, no hepatojugular reflux and no JVD present. Carotid bruit is not present. No thyromegaly present.   Cardiovascular: Normal rate, regular rhythm, S1 normal and S2 normal. PMI is not displaced. Exam reveals no S3, no S4, no distant heart sounds, no friction rub, no midsystolic click and no opening snap.   No murmur heard.  Pulses:       Radial pulses are 2+ on the right side, and 2+ on the left side.   Pulmonary/Chest: Effort  normal. He has decreased breath sounds in the right lower field and the left lower field. He has no wheezes. He has no rales.   Abdominal: Soft. Bowel sounds are normal. He exhibits no distension, no abdominal bruit, no ascites and no mass. There is no tenderness.   Musculoskeletal: He exhibits no edema.   Neurological: He is alert and oriented to person, place, and time.   Skin: Skin is warm.   Psychiatric: He has a normal mood and affect. His behavior is normal.   Nursing note and vitals reviewed.      Significant Labs:   CMP   Recent Labs   Lab 05/28/19  0432      K 3.6   *   CO2 23   GLU 41*   BUN 22   CREATININE 2.7*   CALCIUM 9.0   PROT 6.6   ALBUMIN 3.2*   BILITOT 0.4   ALKPHOS 78   AST 11   ALT 13   ANIONGAP 10   ESTGFRAFRICA 25*   EGFRNONAA 22*   , CBC   Recent Labs   Lab 05/28/19  0432   WBC 9.19   HGB 12.5*   HCT 37.8*      , INR No results for input(s): INR, PROTIME in the last 48 hours. and Troponin   Recent Labs   Lab 05/28/19  0432   TROPONINI 0.064*       Significant Imaging: Echocardiogram:   2D echo with color flow doppler:   Results for orders placed or performed during the hospital encounter of 05/28/19   2D echo with color flow doppler   Result Value Ref Range    QEF 25 (A) 55 - 65    Mitral Valve Regurgitation MILD     Aortic Valve Stenosis MILD (A)     Tricuspid Valve Regurgitation MILD      Assessment and Plan:     ACUTE ON CHRONIC COMBINED CHF EXACERBATION.  ELEVATED TROPONIN DUE TO SUPPLY/DEMAND ISCHEMIA/RENAL FAILURE AND CHF.  ECHO TODAY SHOWS SIMILAR FINDINGS TO LAST MONTH -- EF 25%, STABLE VALVULAR CONDITIONS.  PT COUNSELED ON CHF MGT.  LOW SALT DIET.  OPTIMIZE MEDICAL TX FOR CAD/CHF.  IV LASIX DIURESIS.  NEPHROLOGY CONSULT PENDING.  F/U LABS IN AM.    CAD, multiple vessel  See management plan detailed above.     Abnormal ECG  See management plan detailed above.     Acute on chronic combined systolic and diastolic heart failure  See management plan detailed above.      Elevated troponin  See management plan detailed above.     Chronic kidney disease, stage IV (severe)  See management plan detailed above.     Aortic stenosis, mild  See management plan detailed above.     Moderate mitral regurgitation  See management plan detailed above.     History of CVA (cerebrovascular accident)  See management plan detailed above.         VTE Risk Mitigation (From admission, onward)        Ordered     heparin (porcine) injection 5,000 Units  Every 8 hours      05/28/19 0808          Thank you for your consult. I will follow-up with patient. Please contact us if you have any additional questions.    Yandel Raman MD  Cardiology   [facility]

## 2019-05-29 ENCOUNTER — TELEPHONE (OUTPATIENT)
Dept: NEPHROLOGY | Facility: CLINIC | Age: 79
End: 2019-05-29

## 2019-05-29 LAB
ALBUMIN SERPL BCP-MCNC: 3 G/DL (ref 3.5–5.2)
ANION GAP SERPL CALC-SCNC: 9 MMOL/L (ref 8–16)
BASOPHILS # BLD AUTO: 0.02 K/UL (ref 0–0.2)
BASOPHILS NFR BLD: 0.2 % (ref 0–1.9)
BUN SERPL-MCNC: 24 MG/DL (ref 8–23)
CALCIUM SERPL-MCNC: 9.1 MG/DL (ref 8.7–10.5)
CHLORIDE SERPL-SCNC: 108 MMOL/L (ref 95–110)
CO2 SERPL-SCNC: 24 MMOL/L (ref 23–29)
CREAT SERPL-MCNC: 2.7 MG/DL (ref 0.5–1.4)
DIFFERENTIAL METHOD: ABNORMAL
EOSINOPHIL # BLD AUTO: 0.2 K/UL (ref 0–0.5)
EOSINOPHIL NFR BLD: 2 % (ref 0–8)
ERYTHROCYTE [DISTWIDTH] IN BLOOD BY AUTOMATED COUNT: 15.7 % (ref 11.5–14.5)
EST. GFR  (AFRICAN AMERICAN): 25 ML/MIN/1.73 M^2
EST. GFR  (NON AFRICAN AMERICAN): 22 ML/MIN/1.73 M^2
GLUCOSE SERPL-MCNC: 83 MG/DL (ref 70–110)
HCT VFR BLD AUTO: 36.1 % (ref 40–54)
HGB BLD-MCNC: 11.7 G/DL (ref 14–18)
LYMPHOCYTES # BLD AUTO: 0.8 K/UL (ref 1–4.8)
LYMPHOCYTES NFR BLD: 9 % (ref 18–48)
MCH RBC QN AUTO: 29.1 PG (ref 27–31)
MCHC RBC AUTO-ENTMCNC: 32.4 G/DL (ref 32–36)
MCV RBC AUTO: 90 FL (ref 82–98)
MONOCYTES # BLD AUTO: 1 K/UL (ref 0.3–1)
MONOCYTES NFR BLD: 11.2 % (ref 4–15)
NEUTROPHILS # BLD AUTO: 6.6 K/UL (ref 1.8–7.7)
NEUTROPHILS NFR BLD: 77.6 % (ref 38–73)
PHOSPHATE SERPL-MCNC: 3.9 MG/DL (ref 2.7–4.5)
PLATELET # BLD AUTO: 299 K/UL (ref 150–350)
PMV BLD AUTO: 9.3 FL (ref 9.2–12.9)
POCT GLUCOSE: 118 MG/DL (ref 70–110)
POCT GLUCOSE: 135 MG/DL (ref 70–110)
POCT GLUCOSE: 139 MG/DL (ref 70–110)
POCT GLUCOSE: 87 MG/DL (ref 70–110)
POTASSIUM SERPL-SCNC: 3.8 MMOL/L (ref 3.5–5.1)
RBC # BLD AUTO: 4.02 M/UL (ref 4.6–6.2)
SODIUM SERPL-SCNC: 141 MMOL/L (ref 136–145)
WBC # BLD AUTO: 8.48 K/UL (ref 3.9–12.7)

## 2019-05-29 PROCEDURE — 21400001 HC TELEMETRY ROOM: Mod: HCNC

## 2019-05-29 PROCEDURE — 27000221 HC OXYGEN, UP TO 24 HOURS: Mod: HCNC

## 2019-05-29 PROCEDURE — 36415 COLL VENOUS BLD VENIPUNCTURE: CPT | Mod: HCNC

## 2019-05-29 PROCEDURE — 25000003 PHARM REV CODE 250: Mod: HCNC | Performed by: INTERNAL MEDICINE

## 2019-05-29 PROCEDURE — 63600175 PHARM REV CODE 636 W HCPCS: Mod: HCNC | Performed by: INTERNAL MEDICINE

## 2019-05-29 PROCEDURE — 94761 N-INVAS EAR/PLS OXIMETRY MLT: CPT | Mod: HCNC

## 2019-05-29 PROCEDURE — 99232 PR SUBSEQUENT HOSPITAL CARE,LEVL II: ICD-10-PCS | Mod: HCNC,,, | Performed by: INTERNAL MEDICINE

## 2019-05-29 PROCEDURE — 80069 RENAL FUNCTION PANEL: CPT | Mod: HCNC

## 2019-05-29 PROCEDURE — 99232 SBSQ HOSP IP/OBS MODERATE 35: CPT | Mod: HCNC,,, | Performed by: INTERNAL MEDICINE

## 2019-05-29 PROCEDURE — 63600175 PHARM REV CODE 636 W HCPCS: Mod: HCNC

## 2019-05-29 PROCEDURE — 85025 COMPLETE CBC W/AUTO DIFF WBC: CPT | Mod: HCNC

## 2019-05-29 RX ORDER — FUROSEMIDE 40 MG/1
40 TABLET ORAL DAILY
Status: DISCONTINUED | OUTPATIENT
Start: 2019-05-29 | End: 2019-05-30 | Stop reason: HOSPADM

## 2019-05-29 RX ORDER — METOPROLOL SUCCINATE 50 MG/1
50 TABLET, EXTENDED RELEASE ORAL DAILY
Status: DISCONTINUED | OUTPATIENT
Start: 2019-05-29 | End: 2019-05-30 | Stop reason: HOSPADM

## 2019-05-29 RX ORDER — HYDRALAZINE HYDROCHLORIDE 20 MG/ML
INJECTION INTRAMUSCULAR; INTRAVENOUS
Status: COMPLETED
Start: 2019-05-29 | End: 2019-05-29

## 2019-05-29 RX ORDER — ROSUVASTATIN CALCIUM 10 MG/1
10 TABLET, COATED ORAL NIGHTLY
Status: DISCONTINUED | OUTPATIENT
Start: 2019-05-29 | End: 2019-05-30 | Stop reason: HOSPADM

## 2019-05-29 RX ORDER — ISOSORBIDE DINITRATE 10 MG/1
20 TABLET ORAL 3 TIMES DAILY
Status: DISCONTINUED | OUTPATIENT
Start: 2019-05-29 | End: 2019-05-30 | Stop reason: HOSPADM

## 2019-05-29 RX ORDER — HYDRALAZINE HYDROCHLORIDE 20 MG/ML
10 INJECTION INTRAMUSCULAR; INTRAVENOUS EVERY 8 HOURS PRN
Status: DISCONTINUED | OUTPATIENT
Start: 2019-05-29 | End: 2019-05-30 | Stop reason: HOSPADM

## 2019-05-29 RX ORDER — DOXYLAMINE SUCCINATE 25 MG
TABLET ORAL 2 TIMES DAILY
Status: DISCONTINUED | OUTPATIENT
Start: 2019-05-29 | End: 2019-05-30 | Stop reason: HOSPADM

## 2019-05-29 RX ADMIN — HYDRALAZINE HYDROCHLORIDE 10 MG: 20 INJECTION, SOLUTION INTRAMUSCULAR; INTRAVENOUS at 04:05

## 2019-05-29 RX ADMIN — CLOPIDOGREL BISULFATE 75 MG: 75 TABLET ORAL at 09:05

## 2019-05-29 RX ADMIN — HEPARIN SODIUM 5000 UNITS: 5000 INJECTION, SOLUTION INTRAVENOUS; SUBCUTANEOUS at 01:05

## 2019-05-29 RX ADMIN — FUROSEMIDE 40 MG: 40 TABLET ORAL at 09:05

## 2019-05-29 RX ADMIN — OCTREOTIDE ACETATE 50 MCG: 50 INJECTION, SOLUTION INTRAVENOUS; SUBCUTANEOUS at 05:05

## 2019-05-29 RX ADMIN — METOPROLOL SUCCINATE 50 MG: 50 TABLET, EXTENDED RELEASE ORAL at 09:05

## 2019-05-29 RX ADMIN — HEPARIN SODIUM 5000 UNITS: 5000 INJECTION, SOLUTION INTRAVENOUS; SUBCUTANEOUS at 06:05

## 2019-05-29 RX ADMIN — OCTREOTIDE ACETATE 50 MCG: 50 INJECTION, SOLUTION INTRAVENOUS; SUBCUTANEOUS at 11:05

## 2019-05-29 RX ADMIN — ISOSORBIDE DINITRATE 20 MG: 10 TABLET ORAL at 10:05

## 2019-05-29 RX ADMIN — HYDRALAZINE HYDROCHLORIDE 100 MG: 50 TABLET ORAL at 10:05

## 2019-05-29 RX ADMIN — ISOSORBIDE DINITRATE 20 MG: 10 TABLET ORAL at 03:05

## 2019-05-29 RX ADMIN — OCTREOTIDE ACETATE 50 MCG: 50 INJECTION, SOLUTION INTRAVENOUS; SUBCUTANEOUS at 06:05

## 2019-05-29 RX ADMIN — MICONAZOLE NITRATE: 20 CREAM TOPICAL at 11:05

## 2019-05-29 RX ADMIN — PANTOPRAZOLE SODIUM 40 MG: 40 TABLET, DELAYED RELEASE ORAL at 09:05

## 2019-05-29 RX ADMIN — HYDRALAZINE HYDROCHLORIDE 10 MG: 20 INJECTION INTRAMUSCULAR; INTRAVENOUS at 04:05

## 2019-05-29 RX ADMIN — OCTREOTIDE ACETATE 50 MCG: 50 INJECTION, SOLUTION INTRAVENOUS; SUBCUTANEOUS at 12:05

## 2019-05-29 RX ADMIN — HYDRALAZINE HYDROCHLORIDE 100 MG: 50 TABLET ORAL at 07:05

## 2019-05-29 RX ADMIN — HEPARIN SODIUM 5000 UNITS: 5000 INJECTION, SOLUTION INTRAVENOUS; SUBCUTANEOUS at 10:05

## 2019-05-29 RX ADMIN — AMLODIPINE BESYLATE 10 MG: 10 TABLET ORAL at 09:05

## 2019-05-29 RX ADMIN — ROSUVASTATIN CALCIUM 10 MG: 10 TABLET, COATED ORAL at 10:05

## 2019-05-29 RX ADMIN — ISOSORBIDE DINITRATE 20 MG: 10 TABLET ORAL at 09:05

## 2019-05-29 NOTE — TELEPHONE ENCOUNTER
----- Message from Mauri Ron MD sent at 5/29/2019  2:34 PM CDT -----  Patient was in the hospital.  Please arrange and change his appointment from Dr. Escalante on 05/30 to another appointment in a month's time with routine labs

## 2019-05-29 NOTE — PROGRESS NOTES
[facility] -   Cardiology  Progress Note    Patient Name: Carlitos Ellington  MRN: [id number]  Admission Date: 5/28/2019  Hospital Length of Stay: 1 days  Code Status: DNR   Attending Physician: Car Odonnell MD   Primary Care Physician: Raza Uribe MD  Expected Discharge Date: 6/1/2019  Principal Problem:Acute hypoxemic respiratory failure    Subjective:   HPI    Pt admitted with CHF exacerbation.  Pt c/o increased dyspnea on exertion and at rest over the last several days (wife states has been longer duration).  Denies chest pain sxs.    He has h/o 4 v CABG, followed by outside Cardiology group.  Echo 4/19 showed EF 25%, mild AS, mild-mod MR.  Admit labs shows troponin leak 0.064, BNP 1406 and renal failure with creatinine 2.7  He feels better at time of consult after receiving IV lasix.  Has h/o multiple CVA.     Hospital Course:   5/29/19--patient seen and examined in room. No chest pain or SOB today. Feels good today. Much improved since admission. Labs reviewed, Cr 2.7, K+ 3.8.     Interval History: no acute issues noted o/n. Feels much better today on exam. Will need close cardiology follow up after discharge.     Review of Systems   Constitution: Positive for malaise/fatigue.   HENT: Negative for hearing loss and hoarse voice.    Eyes: Negative for blurred vision and visual disturbance.   Cardiovascular: Positive for dyspnea on exertion, leg swelling and orthopnea. Negative for chest pain, claudication, irregular heartbeat, near-syncope, palpitations, paroxysmal nocturnal dyspnea and syncope.   Respiratory: Positive for shortness of breath. Negative for cough, hemoptysis, sleep disturbances due to breathing, snoring and wheezing.    Endocrine: Negative for cold intolerance and heat intolerance.   Hematologic/Lymphatic: Bruises/bleeds easily.   Skin: Negative for color change, dry skin and nail changes.   Musculoskeletal: Positive for arthritis, back pain and joint pain. Negative for  myalgias.   Gastrointestinal: Negative for bloating, abdominal pain, constipation, nausea and vomiting.   Genitourinary: Negative for dysuria, flank pain, hematuria and hesitancy.   Neurological: Positive for weakness. Negative for headaches, light-headedness, loss of balance, numbness and paresthesias.   Psychiatric/Behavioral: Negative for altered mental status.   Allergic/Immunologic: Negative for environmental allergies.     Objective:     Vital Signs (Most Recent):  Temp: 97.4 °F (36.3 °C) (05/29/19 1119)  Pulse: 72 (05/29/19 1133)  Resp: 18 (05/29/19 1119)  BP: (!) 175/79 (05/29/19 1119)  SpO2: 95 % (05/29/19 1119) Vital Signs (24h Range):  Temp:  [97.4 °F (36.3 °C)-98.5 °F (36.9 °C)] 97.4 °F (36.3 °C)  Pulse:  [64-90] 72  Resp:  [16-19] 18  SpO2:  [90 %-96 %] 95 %  BP: (141-197)/(44-86) 175/79     Weight: 99.3 kg (219 lb)  Body mass index is 31.42 kg/m².     SpO2: 95 %  O2 Device (Oxygen Therapy): nasal cannula      Intake/Output Summary (Last 24 hours) at 5/29/2019 1207  Last data filed at 5/29/2019 0900  Gross per 24 hour   Intake 390 ml   Output 1575 ml   Net -1185 ml       Lines/Drains/Airways     Peripheral Intravenous Line                 Peripheral IV - Single Lumen 05/28/19 0432 20 G Left Antecubital 1 day                Physical Exam   Constitutional: He is oriented to person, place, and time. He appears well-developed and well-nourished. No distress.   HENT:   Head: Normocephalic and atraumatic.   Eyes: Pupils are equal, round, and reactive to light.   Neck: Normal range of motion and full passive range of motion without pain. Neck supple. No JVD present.   Cardiovascular: Normal rate, regular rhythm, S1 normal, S2 normal and intact distal pulses. PMI is not displaced.   No murmur heard.  Pulses:       Radial pulses are 2+ on the right side, and 2+ on the left side.        Dorsalis pedis pulses are 2+ on the right side, and 2+ on the left side.   Bp remains elevated today, medications adjusted    Pulmonary/Chest: Effort normal and breath sounds normal. No accessory muscle usage. No respiratory distress. He has no decreased breath sounds. He has no wheezes. He has no rales.   SOB improved significantly today   Abdominal: Soft. Bowel sounds are normal. He exhibits no distension. There is no tenderness.   +obese   Musculoskeletal: Normal range of motion. He exhibits no edema.        Right ankle: He exhibits no swelling.        Left ankle: He exhibits no swelling.   Neurological: He is alert and oriented to person, place, and time.   Skin: Skin is warm and dry. He is not diaphoretic. No cyanosis. Nails show no clubbing.   Psychiatric: He has a normal mood and affect. His speech is normal and behavior is normal. Judgment and thought content normal. Cognition and memory are normal.   Nursing note and vitals reviewed.      Significant Labs:   BMP:   Recent Labs   Lab 05/28/19  0432 05/29/19  0433   GLU 41* 83    141   K 3.6 3.8   * 108   CO2 23 24   BUN 22 24*   CREATININE 2.7* 2.7*   CALCIUM 9.0 9.1   , CBC   Recent Labs   Lab 05/28/19  0432 05/29/19  0433   WBC 9.19 8.48   HGB 12.5* 11.7*   HCT 37.8* 36.1*    299    and All pertinent lab results from the last 24 hours have been reviewed.    Significant Imaging: Echocardiogram:   2D echo with color flow doppler:   Results for orders placed or performed during the hospital encounter of 05/28/19   2D echo with color flow doppler   Result Value Ref Range    QEF 25 (A) 55 - 65    Mitral Valve Regurgitation MILD     Aortic Valve Stenosis MILD (A)     Tricuspid Valve Regurgitation MILD     and X-Ray: CXR: X-Ray Chest 1 View (CXR): No results found for this visit on 05/28/19.    Assessment and Plan:       CAD, multiple vessel  Continue current medical management for now    Abnormal ECG  See management plan detailed above.     Acute on chronic combined systolic and diastolic heart failure  Echo revealed EF 25-30%, mild AS, similar to previous ECHO  findings  Continue BB, Hydralazine, Isordil, Norvasc, Lasix  NO ACEi/ARB given renal function  Dash diet, 2 gm sodium restriction  55-60 ozs fluid restriction  Daily weights  Strict intake and output  Seems much improved today on exam after IV lasix doses.  Close cardiology follow up after discharge      Elevated troponin  Likely secondary to demand ischemia from decompensated CHF and CKD issues  Continue current medical management for now    Chronic kidney disease, stage IV (severe)  Monitor closely, stable    Aortic stenosis, mild  Avoid hypotensive episodes  Continue BB, Norvasc, Lasix daily, Hydralazine, Isordil    Moderate mitral regurgitation  See management plan detailed above.     History of CVA (cerebrovascular accident)  Continue Plavix and statin    Hypertension associated with diabetes  On medical therapy  Continue BB, Norvasc  No ACEi/ARB given renal function        VTE Risk Mitigation (From admission, onward)        Ordered     heparin (porcine) injection 5,000 Units  Every 8 hours      05/28/19 0808          Cailin Coker, [id number]  Cardiology  [facility] [place]

## 2019-05-29 NOTE — PLAN OF CARE
Problem: Adult Inpatient Plan of Care  Goal: Patient-Specific Goal (Individualization)  Outcome: Ongoing (interventions implemented as appropriate)  Patient awake and alert, in NAD. Patient had uneventful shift. BP elevated, monitoring closely. Patient denies pain or SOB. Patient on telemetry, NSR. Patient ambulates with assistance. Fall precautions in place. Bed locked and in lowest position. Yellow fall risk band and non-skid socks on patient. Patient free from fall/injury. Plan of care reviewed with patient. All questions answered. Will continue to monitor.

## 2019-05-29 NOTE — HOSPITAL COURSE
5/29/19--patient seen and examined in room. No chest pain or SOB today. Feels good today. Much improved since admission. Labs reviewed, Cr 2.7, K+ 3.8.     5/30/19--Patient seen and examined in room, lying in bed. No chest pain or SOB today. Feels ready for discharge today. Labs reviewed, stable.

## 2019-05-29 NOTE — ASSESSMENT & PLAN NOTE
Patient has multiorgan failure including acute on chronic congestive heart failure with ischemic cardiomyopathy.  Also has valvular heart disease.  Also has type 2 diabetes and hypertension.  Patient also has chronic kidney disease which is getting worse with time.  Approximate GFR is about 20-25%.  Will check cystatin C for accurate GFR.    Will arrange a routine follow-up in the clinic in 1 month

## 2019-05-29 NOTE — NURSING
"Patient assessed for diabetes educational needs following chart review  Wife at bedside for info  He reports was diagnosed over 20 yeas ago and has attended diabetes education class in the past  He reports lost his home glucose monitor in a move and it was "packed away somewhere"  Provided with a Contour next glucose meter and reviewed meter operation    He normally checks 1-2 times daily; and will resume  He is consistent with administering his insulin using the vial/syringe method of insulin delivery  Reports proper insulin storage and site selection/rotation  No further diabetes educational needs identified at this time  "

## 2019-05-29 NOTE — PROGRESS NOTES
[facility] -   Nephrology  Progress Note    Patient Name: Carlitos Ellington  MRN: [id number]  Admission Date: 5/28/2019  Hospital Length of Stay: 1 days  Attending Provider: Car Odonnell MD   Primary Care Physician: Raza Uribe MD  Principal Problem:Acute hypoxemic respiratory failure    Subjective:     HPI: Patient is a 78-year-old male with history of acute on chronic congestive heart failure with valvular heart disease and ischemic cardiomyopathy.  Patient also has chronic kidney disease for at least 5 years.  Patient had CABG followed by multiple procedures which resulted in chronic kidney disease according to the wife.  Patient's baseline kidney function in 2018 was 25% with a creatinine of 2.3.  Patient's current admission also is acute pulmonary edema and acute congestive heart failure with a creatinine of 2.7.  His proteinuria has been ranging between 1.3 and 2.5 g per 24 hr.  Nephrology consultation is requested for evaluation and management of chronic kidney disease stage 4.  Patient has responded very well to the diuretics given.  Seems to have much improved shortness of breath and PND orthopnea and all the other symptoms for which she came foreign admitted for.  Denies any urinary obstructive symptoms.  Denies any BPH symptoms.  Denies any fevers and chills.  Denies any flank pain. Patient seen and evaluated in ICU at the bedside for management of chronic kidney disease stage 4.    Interval History:  Good urine output on IV fluid Lasix.  Acute congestive heart failure has improved.  Chronic kidney disease stage 4 stabilized    Review of patient's allergies indicates:   Allergen Reactions    Influenza virus vaccines Other (See Comments)     Red streaks up arm    Penicillins Swelling     Current Facility-Administered Medications   Medication Frequency    amLODIPine tablet 10 mg Daily    clopidogrel tablet 75 mg Daily    dextrose 50% injection 12.5 g PRN    dextrose 50% injection  25 g PRN    furosemide tablet 40 mg Daily    glucagon (human recombinant) injection 1 mg PRN    glucose chewable tablet 16 g PRN    glucose chewable tablet 24 g PRN    heparin (porcine) injection 5,000 Units Q8H    hydrALAZINE injection 10 mg Q8H PRN    hydrALAZINE tablet 100 mg Q12H    isosorbide dinitrate tablet 20 mg TID    metoprolol succinate (TOPROL-XL) 24 hr tablet 50 mg Daily    octreotide injection 50 mcg Q6H    pantoprazole EC tablet 40 mg Daily    rosuvastatin tablet 10 mg QHS       Objective:     Vital Signs (Most Recent):  Temp: 97.4 °F (36.3 °C) (05/29/19 1119)  Pulse: 68 (05/29/19 1305)  Resp: 18 (05/29/19 1119)  BP: (!) 162/70 (05/29/19 1248)  SpO2: 95 % (05/29/19 1119)  O2 Device (Oxygen Therapy): nasal cannula (05/29/19 1119) Vital Signs (24h Range):  Temp:  [97.4 °F (36.3 °C)-98.5 °F (36.9 °C)] 97.4 °F (36.3 °C)  Pulse:  [67-90] 68  Resp:  [16-19] 18  SpO2:  [90 %-96 %] 95 %  BP: (146-197)/(66-86) 162/70     Weight: 99.3 kg (219 lb) (05/28/19 0718)  Body mass index is 31.42 kg/m².  Body surface area is 2.21 meters squared.    I/O last 3 completed shifts:  In: 870 [P.O.:870]  Out: 3400 [Urine:3400]    Physical Exam   Constitutional: He appears well-developed and well-nourished. No distress.   HENT:   Head: Normocephalic.   Eyes: Pupils are equal, round, and reactive to light. EOM are normal.   Neck: Normal range of motion. Neck supple. No JVD present. No thyromegaly present.   Cardiovascular: S1 normal and S2 normal. PMI is not displaced. Exam reveals no gallop and no friction rub.   No murmur heard.  Pulses:       Carotid pulses are 1+ on the right side, and 1+ on the left side.       Radial pulses are 1+ on the right side, and 1+ on the left side.        Femoral pulses are 1+ on the right side, and 1+ on the left side.       Popliteal pulses are 1+ on the right side, and 1+ on the left side.        Dorsalis pedis pulses are 1+ on the right side, and 1+ on the left side.         Posterior tibial pulses are 1+ on the right side, and 1+ on the left side.   PMI is displaced laterally, right ventricular heave loud P2 consistent with signs of pulmonary hypertension.   Pulmonary/Chest: He is in respiratory distress. He has decreased breath sounds in the right lower field and the left middle field. He has wheezes in the right lower field and the left lower field. He has rhonchi in the right lower field and the left lower field. He has rales in the right lower field and the left lower field. He exhibits no tenderness.   Abdominal: He exhibits no distension and no mass. There is no hepatosplenomegaly. There is no tenderness. There is no rebound and no CVA tenderness. No hernia.   Musculoskeletal: Normal range of motion. He exhibits edema. He exhibits no tenderness.   Lymphadenopathy:     He has no cervical adenopathy.   Neurological: He is alert. He has normal reflexes. He is not disoriented. He displays normal reflexes. No cranial nerve deficit. He exhibits normal muscle tone. Coordination normal.   Skin: Skin is warm and dry. Capillary refill takes 2 to 3 seconds. No rash noted. No erythema.   Nursing note and vitals reviewed.      Significant Labs:  All labs within the past 24 hours have been reviewed.     Significant Imaging:      Assessment/Plan:     Chronic kidney disease, stage IV (severe)  Patient has multiorgan failure including acute on chronic congestive heart failure with ischemic cardiomyopathy.  Also has valvular heart disease.  Also has type 2 diabetes and hypertension.  Patient also has chronic kidney disease which is getting worse with time.  Approximate GFR is about 20-25%.  Will check cystatin C for accurate GFR.    Will arrange a routine follow-up in the clinic in 1 month        Thank you for your consult.     Mauri Ron MD  Nephrology  [facility]

## 2019-05-29 NOTE — ASSESSMENT & PLAN NOTE
Likely secondary to demand ischemia from decompensated CHF and CKD issues  Continue current medical management for now

## 2019-05-29 NOTE — PLAN OF CARE
Problem: Adult Inpatient Plan of Care  Goal: Plan of Care Review  Outcome: Ongoing (interventions implemented as appropriate)  POC reviewed, verbalized understanding. Pt remained free from falls, fall precautions in place. Pt is SR  on monitor,. VSS. Transfer from ICU B/P alittle elevated scheduled meds given  No other c/o at this time. PIV intact blood sugar stable no sliding scale yet. Call bell and personal belongings within reach.  Bed alarm set Hourly rounding complete. Reminded to call for assistance. Will continue to monitor.

## 2019-05-29 NOTE — SUBJECTIVE & OBJECTIVE
Interval History:  Good urine output on IV fluid Lasix.  Acute congestive heart failure has improved.  Chronic kidney disease stage 4 stabilized    Review of patient's allergies indicates:   Allergen Reactions    Influenza virus vaccines Other (See Comments)     Red streaks up arm    Penicillins Swelling     Current Facility-Administered Medications   Medication Frequency    amLODIPine tablet 10 mg Daily    clopidogrel tablet 75 mg Daily    dextrose 50% injection 12.5 g PRN    dextrose 50% injection 25 g PRN    furosemide tablet 40 mg Daily    glucagon (human recombinant) injection 1 mg PRN    glucose chewable tablet 16 g PRN    glucose chewable tablet 24 g PRN    heparin (porcine) injection 5,000 Units Q8H    hydrALAZINE injection 10 mg Q8H PRN    hydrALAZINE tablet 100 mg Q12H    isosorbide dinitrate tablet 20 mg TID    metoprolol succinate (TOPROL-XL) 24 hr tablet 50 mg Daily    octreotide injection 50 mcg Q6H    pantoprazole EC tablet 40 mg Daily    rosuvastatin tablet 10 mg QHS       Objective:     Vital Signs (Most Recent):  Temp: 97.4 °F (36.3 °C) (05/29/19 1119)  Pulse: 68 (05/29/19 1305)  Resp: 18 (05/29/19 1119)  BP: (!) 162/70 (05/29/19 1248)  SpO2: 95 % (05/29/19 1119)  O2 Device (Oxygen Therapy): nasal cannula (05/29/19 1119) Vital Signs (24h Range):  Temp:  [97.4 °F (36.3 °C)-98.5 °F (36.9 °C)] 97.4 °F (36.3 °C)  Pulse:  [67-90] 68  Resp:  [16-19] 18  SpO2:  [90 %-96 %] 95 %  BP: (146-197)/(66-86) 162/70     Weight: 99.3 kg (219 lb) (05/28/19 0718)  Body mass index is 31.42 kg/m².  Body surface area is 2.21 meters squared.    I/O last 3 completed shifts:  In: 870 [P.O.:870]  Out: 3400 [Urine:3400]    Physical Exam   Constitutional: He appears well-developed and well-nourished. No distress.   HENT:   Head: Normocephalic.   Eyes: Pupils are equal, round, and reactive to light. EOM are normal.   Neck: Normal range of motion. Neck supple. No JVD present. No thyromegaly present.    Cardiovascular: S1 normal and S2 normal. PMI is not displaced. Exam reveals no gallop and no friction rub.   No murmur heard.  Pulses:       Carotid pulses are 1+ on the right side, and 1+ on the left side.       Radial pulses are 1+ on the right side, and 1+ on the left side.        Femoral pulses are 1+ on the right side, and 1+ on the left side.       Popliteal pulses are 1+ on the right side, and 1+ on the left side.        Dorsalis pedis pulses are 1+ on the right side, and 1+ on the left side.        Posterior tibial pulses are 1+ on the right side, and 1+ on the left side.   PMI is displaced laterally, right ventricular heave loud P2 consistent with signs of pulmonary hypertension.   Pulmonary/Chest: He is in respiratory distress. He has decreased breath sounds in the right lower field and the left middle field. He has wheezes in the right lower field and the left lower field. He has rhonchi in the right lower field and the left lower field. He has rales in the right lower field and the left lower field. He exhibits no tenderness.   Abdominal: He exhibits no distension and no mass. There is no hepatosplenomegaly. There is no tenderness. There is no rebound and no CVA tenderness. No hernia.   Musculoskeletal: Normal range of motion. He exhibits edema. He exhibits no tenderness.   Lymphadenopathy:     He has no cervical adenopathy.   Neurological: He is alert. He has normal reflexes. He is not disoriented. He displays normal reflexes. No cranial nerve deficit. He exhibits normal muscle tone. Coordination normal.   Skin: Skin is warm and dry. Capillary refill takes 2 to 3 seconds. No rash noted. No erythema.   Nursing note and vitals reviewed.      Significant Labs:  All labs within the past 24 hours have been reviewed.     Significant Imaging:

## 2019-05-29 NOTE — PROGRESS NOTES
[facility] Hospital Medicine  Progress Note    Patient Name: Carlitos Ellington  MRN: [id number]  Patient Class: IP- Inpatient   Admission Date: 5/28/2019  Length of Stay: 1 days  Attending Physician: Car Odonnell MD  Primary Care Provider: Raza Uribe MD        Subjective:     Principal Problem:Acute hypoxemic respiratory failure    HPI:  Came to the Emergency Room for shortness of breath worsening over the last couple days.  Worse at rest and with exertion.  He endorses orthopnea at baseline.  Denies any other recent illness.  He was recently started on Glipizide and was hypoglycemic in the ED.                   History of Present Illness: Carlitos Ellington is a 78 y.o. male patient with a PMHx of carotid stenosis, CKD, CHF, DM, CBA, HLD, HTN, PSHx of CABGx4 who presents to the Emergency Department for orthopnea which onset gradually 2 months ago and worsened 2-3 days ago. Symptoms are worsening and severe. Pt takes lasix every 3 days. Associated sxs include diaphoresis. Patient denies any fever, chills, cough, congestion, CP, BLE edema/pain, palpitations, n/v, extremity weakness/numbness, dizziness, recent travel/long [place] rides, and all other sxs at this time. No further complaints or concerns at this time.        Hospital Course:  Admitted for evaluation and treatment of shortness of breath due to heart failure exacerbation.  Started IV Furosemide and dietary Sodium and fluid restriction.  Cardiology evaluated and following with recommendations.    Interval History:  Marginal interval improvement.  Voiding normally and tolerating diet.  Ambulating without assistance.    Review of Systems   Constitutional: Negative.  Negative for chills and fever.   HENT: Negative.  Negative for congestion and sore throat.    Eyes: Negative.  Negative for visual disturbance.   Respiratory: Positive for cough and shortness of breath. Negative for wheezing.    Cardiovascular: Positive for leg swelling.  Negative for chest pain.   Gastrointestinal: Negative for abdominal pain, diarrhea, nausea and vomiting.   Endocrine: Negative.    Genitourinary: Negative.    Musculoskeletal: Negative.  Negative for myalgias and neck stiffness.   Skin: Negative.  Negative for color change and pallor.   Allergic/Immunologic: Negative.    Neurological: Negative.    Hematological: Negative.    Psychiatric/Behavioral: Negative.    All other systems reviewed and are negative.    Objective:     Vital Signs (Most Recent):  Temp: 97.8 °F (36.6 °C) (05/29/19 1644)  Pulse: 69 (05/29/19 1732)  Resp: 18 (05/29/19 1732)  BP: (!) 158/65 (05/29/19 1644)  SpO2: (!) 92 % (05/29/19 1732) Vital Signs (24h Range):  Temp:  [97.4 °F (36.3 °C)-98.5 °F (36.9 °C)] 97.8 °F (36.6 °C)  Pulse:  [60-90] 69  Resp:  [16-18] 18  SpO2:  [90 %-98 %] 92 %  BP: (158-197)/(65-86) 158/65     Weight: 99.3 kg (219 lb)  Body mass index is 31.42 kg/m².    Intake/Output Summary (Last 24 hours) at 5/29/2019 1747  Last data filed at 5/29/2019 1700  Gross per 24 hour   Intake 520 ml   Output 1100 ml   Net -580 ml      Physical Exam   Constitutional: He is oriented to person, place, and time. He appears well-developed and well-nourished. No distress.   HENT:   Head: Normocephalic and atraumatic.   Mouth/Throat: Oropharynx is clear and moist.   Eyes: Pupils are equal, round, and reactive to light. Conjunctivae and EOM are normal.   Neck: No JVD present. No thyromegaly present.   Cardiovascular: Normal rate, regular rhythm and normal heart sounds. Exam reveals no gallop and no friction rub.   No murmur heard.  Pulmonary/Chest: Effort normal. No respiratory distress. He has no wheezes. He has no rales.   Faint crackles bilaterally   Abdominal: Soft. Bowel sounds are normal. He exhibits no distension. There is no tenderness. There is no rebound and no guarding.   Musculoskeletal: Normal range of motion. He exhibits edema. He exhibits no tenderness.   1+ pre-tibial edema bilaterally.    Lymphadenopathy:     He has no cervical adenopathy.   Neurological: He is alert and oriented to person, place, and time. He has normal reflexes. He displays normal reflexes. No cranial nerve deficit.   Skin: Skin is warm and dry. No rash noted. He is not diaphoretic. No erythema.   Psychiatric: He has a normal mood and affect. His behavior is normal. Judgment and thought content normal.       Significant Labs: All pertinent labs within the past 24 hours have been reviewed.    Significant Imaging: I have reviewed all pertinent imaging results/findings within the past 24 hours.    Assessment/Plan:      * Acute hypoxemic respiratory failure  Due to acute heart failure exacerbation.  Responding to diuresis.  Continue supplemental oxygen.    Acute on chronic combined systolic and diastolic heart failure  Continuing diuresis and fluid/Sodium restriction.  Daily weights and strict I&O.  Cardiology to evaluate.  Troponin slightly elevated.  No acute EKG changes.    Hypoglycemia  Possibly due to recent addition of Glipizide.  Hourly blood sugar checks.  Also gave Octreotide.  Resume Diabetic diet.  Holding home medications.    Chronic kidney disease, stage IV (severe)  Careful fluid management.  Monitor urine output and renal function chemistries.  Nephrology to evaluate.    History of CVA (cerebrovascular accident)  PT and OT evaluation.      VTE Risk Mitigation (From admission, onward)        Ordered     heparin (porcine) injection 5,000 Units  Every 8 hours      05/28/19 0829              Car Odonnell MD  Department of Hospital Medicine   [facility]

## 2019-05-29 NOTE — PROGRESS NOTES
Pharmacist Renal Dose Adjustment Note    Carlitos Ellington is a 78 y.o. male being treated with the medication rosuvastatin.    Patient Data:    Vital Signs (Most Recent):  Temp: 98.4 °F (36.9 °C) (05/29/19 0744)  Pulse: 67 (05/29/19 0744)  Resp: 18 (05/29/19 0744)  BP: (!) 163/70 (05/29/19 0744)  SpO2: (!) 94 % (05/29/19 0744)   Vital Signs (72h Range):  Temp:  [97.6 °F (36.4 °C)-98.5 °F (36.9 °C)]   Pulse:  [54-90]   Resp:  [10-22]   BP: (140-209)/(35-86)   SpO2:  [89 %-97 %]      Recent Labs   Lab 05/28/19  0432 05/29/19 0433   CREATININE 2.7* 2.7*     Serum creatinine: 2.7 mg/dL (H) 05/29/19 0433  Estimated creatinine clearance: 26.6 mL/min (A)    Medication:rosuvastatin dose: 20 mg frequency Q24H will be changed to medication:rosuvastatin dose: 10 mg frequency:Q24H.    Pharmacist's Name: Narda Philippe  Pharmacist's Extension: [phone]

## 2019-05-29 NOTE — NURSING
Notified Patty San NP B/p still elevated after IV hydralazine still no symptoms response was to give po hydralazine this am

## 2019-05-29 NOTE — SUBJECTIVE & OBJECTIVE
Interval History: no acute issues noted o/n. Feels much better today on exam. Will need close cardiology follow up after discharge.     Review of Systems   Constitution: Positive for malaise/fatigue.   HENT: Negative for hearing loss and hoarse voice.    Eyes: Negative for blurred vision and visual disturbance.   Cardiovascular: Positive for dyspnea on exertion, leg swelling and orthopnea. Negative for chest pain, claudication, irregular heartbeat, near-syncope, palpitations, paroxysmal nocturnal dyspnea and syncope.   Respiratory: Positive for shortness of breath. Negative for cough, hemoptysis, sleep disturbances due to breathing, snoring and wheezing.    Endocrine: Negative for cold intolerance and heat intolerance.   Hematologic/Lymphatic: Bruises/bleeds easily.   Skin: Negative for color change, dry skin and nail changes.   Musculoskeletal: Positive for arthritis, back pain and joint pain. Negative for myalgias.   Gastrointestinal: Negative for bloating, abdominal pain, constipation, nausea and vomiting.   Genitourinary: Negative for dysuria, flank pain, hematuria and hesitancy.   Neurological: Positive for weakness. Negative for headaches, light-headedness, loss of balance, numbness and paresthesias.   Psychiatric/Behavioral: Negative for altered mental status.   Allergic/Immunologic: Negative for environmental allergies.     Objective:     Vital Signs (Most Recent):  Temp: 97.4 °F (36.3 °C) (05/29/19 1119)  Pulse: 72 (05/29/19 1133)  Resp: 18 (05/29/19 1119)  BP: (!) 175/79 (05/29/19 1119)  SpO2: 95 % (05/29/19 1119) Vital Signs (24h Range):  Temp:  [97.4 °F (36.3 °C)-98.5 °F (36.9 °C)] 97.4 °F (36.3 °C)  Pulse:  [64-90] 72  Resp:  [16-19] 18  SpO2:  [90 %-96 %] 95 %  BP: (141-197)/(44-86) 175/79     Weight: 99.3 kg (219 lb)  Body mass index is 31.42 kg/m².     SpO2: 95 %  O2 Device (Oxygen Therapy): nasal cannula      Intake/Output Summary (Last 24 hours) at 5/29/2019 1207  Last data filed at 5/29/2019  0900  Gross per 24 hour   Intake 390 ml   Output 1575 ml   Net -1185 ml       Lines/Drains/Airways     Peripheral Intravenous Line                 Peripheral IV - Single Lumen 05/28/19 0432 20 G Left Antecubital 1 day                Physical Exam   Constitutional: He is oriented to person, place, and time. He appears well-developed and well-nourished. No distress.   HENT:   Head: Normocephalic and atraumatic.   Eyes: Pupils are equal, round, and reactive to light.   Neck: Normal range of motion and full passive range of motion without pain. Neck supple. No JVD present.   Cardiovascular: Normal rate, regular rhythm, S1 normal, S2 normal and intact distal pulses. PMI is not displaced.   No murmur heard.  Pulses:       Radial pulses are 2+ on the right side, and 2+ on the left side.        Dorsalis pedis pulses are 2+ on the right side, and 2+ on the left side.   Bp remains elevated today, medications adjusted   Pulmonary/Chest: Effort normal and breath sounds normal. No accessory muscle usage. No respiratory distress. He has no decreased breath sounds. He has no wheezes. He has no rales.   SOB improved significantly today   Abdominal: Soft. Bowel sounds are normal. He exhibits no distension. There is no tenderness.   +obese   Musculoskeletal: Normal range of motion. He exhibits no edema.        Right ankle: He exhibits no swelling.        Left ankle: He exhibits no swelling.   Neurological: He is alert and oriented to person, place, and time.   Skin: Skin is warm and dry. He is not diaphoretic. No cyanosis. Nails show no clubbing.   Psychiatric: He has a normal mood and affect. His speech is normal and behavior is normal. Judgment and thought content normal. Cognition and memory are normal.   Nursing note and vitals reviewed.      Significant Labs:   BMP:   Recent Labs   Lab 05/28/19  0432 05/29/19  0433   GLU 41* 83    141   K 3.6 3.8   * 108   CO2 23 24   BUN 22 24*   CREATININE 2.7* 2.7*   CALCIUM 9.0  9.1   , CBC   Recent Labs   Lab 05/28/19  0432 05/29/19  0433   WBC 9.19 8.48   HGB 12.5* 11.7*   HCT 37.8* 36.1*    299    and All pertinent lab results from the last 24 hours have been reviewed.    Significant Imaging: Echocardiogram:   2D echo with color flow doppler:   Results for orders placed or performed during the hospital encounter of 05/28/19   2D echo with color flow doppler   Result Value Ref Range    QEF 25 (A) 55 - 65    Mitral Valve Regurgitation MILD     Aortic Valve Stenosis MILD (A)     Tricuspid Valve Regurgitation MILD     and X-Ray: CXR: X-Ray Chest 1 View (CXR): No results found for this visit on 05/28/19.

## 2019-05-29 NOTE — SUBJECTIVE & OBJECTIVE
Interval History:  Marginal interval improvement.  Voiding normally and tolerating diet.  Ambulating without assistance.    Review of Systems   Constitutional: Negative.  Negative for chills and fever.   HENT: Negative.  Negative for congestion and sore throat.    Eyes: Negative.  Negative for visual disturbance.   Respiratory: Positive for cough and shortness of breath. Negative for wheezing.    Cardiovascular: Positive for leg swelling. Negative for chest pain.   Gastrointestinal: Negative for abdominal pain, diarrhea, nausea and vomiting.   Endocrine: Negative.    Genitourinary: Negative.    Musculoskeletal: Negative.  Negative for myalgias and neck stiffness.   Skin: Negative.  Negative for color change and pallor.   Allergic/Immunologic: Negative.    Neurological: Negative.    Hematological: Negative.    Psychiatric/Behavioral: Negative.    All other systems reviewed and are negative.    Objective:     Vital Signs (Most Recent):  Temp: 97.8 °F (36.6 °C) (05/29/19 1644)  Pulse: 69 (05/29/19 1732)  Resp: 18 (05/29/19 1732)  BP: (!) 158/65 (05/29/19 1644)  SpO2: (!) 92 % (05/29/19 1732) Vital Signs (24h Range):  Temp:  [97.4 °F (36.3 °C)-98.5 °F (36.9 °C)] 97.8 °F (36.6 °C)  Pulse:  [60-90] 69  Resp:  [16-18] 18  SpO2:  [90 %-98 %] 92 %  BP: (158-197)/(65-86) 158/65     Weight: 99.3 kg (219 lb)  Body mass index is 31.42 kg/m².    Intake/Output Summary (Last 24 hours) at 5/29/2019 1747  Last data filed at 5/29/2019 1700  Gross per 24 hour   Intake 520 ml   Output 1100 ml   Net -580 ml      Physical Exam   Constitutional: He is oriented to person, place, and time. He appears well-developed and well-nourished. No distress.   HENT:   Head: Normocephalic and atraumatic.   Mouth/Throat: Oropharynx is clear and moist.   Eyes: Pupils are equal, round, and reactive to light. Conjunctivae and EOM are normal.   Neck: No JVD present. No thyromegaly present.   Cardiovascular: Normal rate, regular rhythm and normal heart  sounds. Exam reveals no gallop and no friction rub.   No murmur heard.  Pulmonary/Chest: Effort normal. No respiratory distress. He has no wheezes. He has no rales.   Faint crackles bilaterally   Abdominal: Soft. Bowel sounds are normal. He exhibits no distension. There is no tenderness. There is no rebound and no guarding.   Musculoskeletal: Normal range of motion. He exhibits edema. He exhibits no tenderness.   1+ pre-tibial edema bilaterally.   Lymphadenopathy:     He has no cervical adenopathy.   Neurological: He is alert and oriented to person, place, and time. He has normal reflexes. He displays normal reflexes. No cranial nerve deficit.   Skin: Skin is warm and dry. No rash noted. He is not diaphoretic. No erythema.   Psychiatric: He has a normal mood and affect. His behavior is normal. Judgment and thought content normal.       Significant Labs: All pertinent labs within the past 24 hours have been reviewed.    Significant Imaging: I have reviewed all pertinent imaging results/findings within the past 24 hours.

## 2019-05-29 NOTE — ASSESSMENT & PLAN NOTE
Echo revealed EF 25-30%, mild AS, similar to previous ECHO findings  Continue BB, Hydralazine, Isordil, Norvasc, Lasix  NO ACEi/ARB given renal function  Dash diet, 2 gm sodium restriction  55-60 ozs fluid restriction  Daily weights  Strict intake and output  Close cardiology follow up after discharge

## 2019-05-29 NOTE — HOSPITAL COURSE
Admitted for evaluation and treatment of shortness of breath due to heart failure exacerbation.  Started IV Furosemide and dietary Sodium and fluid restriction.  Octreotide 50 mcg every 6 hours for hypoglycemia.  Cardiology evaluated and following with recommendations.  Symptoms improved with diuresis.  Blood sugars returned to normal or marginally high.  Discharge plan to return home.  Stop taking Glipizide.  Reduce Lantus from 50 units daily to 35 and follow up with PCP.

## 2019-05-30 VITALS
RESPIRATION RATE: 18 BRPM | HEIGHT: 70 IN | TEMPERATURE: 98 F | OXYGEN SATURATION: 96 % | DIASTOLIC BLOOD PRESSURE: 79 MMHG | SYSTOLIC BLOOD PRESSURE: 168 MMHG | WEIGHT: 201.75 LBS | BODY MASS INDEX: 28.88 KG/M2 | HEART RATE: 81 BPM

## 2019-05-30 LAB
ALBUMIN SERPL BCP-MCNC: 3.1 G/DL (ref 3.5–5.2)
ANION GAP SERPL CALC-SCNC: 8 MMOL/L (ref 8–16)
BASOPHILS # BLD AUTO: 0.01 K/UL (ref 0–0.2)
BASOPHILS NFR BLD: 0.1 % (ref 0–1.9)
BUN SERPL-MCNC: 24 MG/DL (ref 8–23)
CALCIUM SERPL-MCNC: 9.6 MG/DL (ref 8.7–10.5)
CHLORIDE SERPL-SCNC: 106 MMOL/L (ref 95–110)
CO2 SERPL-SCNC: 26 MMOL/L (ref 23–29)
CREAT SERPL-MCNC: 2.8 MG/DL (ref 0.5–1.4)
DIFFERENTIAL METHOD: ABNORMAL
EOSINOPHIL # BLD AUTO: 0.2 K/UL (ref 0–0.5)
EOSINOPHIL NFR BLD: 2.7 % (ref 0–8)
ERYTHROCYTE [DISTWIDTH] IN BLOOD BY AUTOMATED COUNT: 15.7 % (ref 11.5–14.5)
EST. GFR  (AFRICAN AMERICAN): 24 ML/MIN/1.73 M^2
EST. GFR  (NON AFRICAN AMERICAN): 21 ML/MIN/1.73 M^2
GLUCOSE SERPL-MCNC: 92 MG/DL (ref 70–110)
HCT VFR BLD AUTO: 38.7 % (ref 40–54)
HGB BLD-MCNC: 12.5 G/DL (ref 14–18)
LYMPHOCYTES # BLD AUTO: 0.9 K/UL (ref 1–4.8)
LYMPHOCYTES NFR BLD: 11.7 % (ref 18–48)
MCH RBC QN AUTO: 29.3 PG (ref 27–31)
MCHC RBC AUTO-ENTMCNC: 32.3 G/DL (ref 32–36)
MCV RBC AUTO: 91 FL (ref 82–98)
MONOCYTES # BLD AUTO: 1 K/UL (ref 0.3–1)
MONOCYTES NFR BLD: 13.4 % (ref 4–15)
NEUTROPHILS # BLD AUTO: 5.4 K/UL (ref 1.8–7.7)
NEUTROPHILS NFR BLD: 72.1 % (ref 38–73)
PHOSPHATE SERPL-MCNC: 4.2 MG/DL (ref 2.7–4.5)
PLATELET # BLD AUTO: 319 K/UL (ref 150–350)
PMV BLD AUTO: 9.7 FL (ref 9.2–12.9)
POCT GLUCOSE: 106 MG/DL (ref 70–110)
POCT GLUCOSE: 163 MG/DL (ref 70–110)
POTASSIUM SERPL-SCNC: 4.8 MMOL/L (ref 3.5–5.1)
RBC # BLD AUTO: 4.27 M/UL (ref 4.6–6.2)
SODIUM SERPL-SCNC: 140 MMOL/L (ref 136–145)
WBC # BLD AUTO: 7.54 K/UL (ref 3.9–12.7)

## 2019-05-30 PROCEDURE — 36415 COLL VENOUS BLD VENIPUNCTURE: CPT | Mod: HCNC

## 2019-05-30 PROCEDURE — 25000003 PHARM REV CODE 250: Mod: HCNC | Performed by: INTERNAL MEDICINE

## 2019-05-30 PROCEDURE — 85025 COMPLETE CBC W/AUTO DIFF WBC: CPT | Mod: HCNC

## 2019-05-30 PROCEDURE — 63600175 PHARM REV CODE 636 W HCPCS: Mod: HCNC | Performed by: NURSE PRACTITIONER

## 2019-05-30 PROCEDURE — 99232 PR SUBSEQUENT HOSPITAL CARE,LEVL II: ICD-10-PCS | Mod: HCNC,,, | Performed by: INTERNAL MEDICINE

## 2019-05-30 PROCEDURE — 63600175 PHARM REV CODE 636 W HCPCS: Mod: HCNC | Performed by: INTERNAL MEDICINE

## 2019-05-30 PROCEDURE — 80069 RENAL FUNCTION PANEL: CPT | Mod: HCNC

## 2019-05-30 PROCEDURE — 27000221 HC OXYGEN, UP TO 24 HOURS: Mod: HCNC

## 2019-05-30 PROCEDURE — 99232 SBSQ HOSP IP/OBS MODERATE 35: CPT | Mod: HCNC,,, | Performed by: INTERNAL MEDICINE

## 2019-05-30 PROCEDURE — 94761 N-INVAS EAR/PLS OXIMETRY MLT: CPT | Mod: HCNC

## 2019-05-30 RX ORDER — SERTRALINE HYDROCHLORIDE 25 MG/1
25 TABLET, FILM COATED ORAL DAILY
Qty: 30 TABLET | Refills: 1 | Status: SHIPPED | OUTPATIENT
Start: 2019-05-30 | End: 2019-06-26

## 2019-05-30 RX ORDER — SERTRALINE HYDROCHLORIDE 50 MG/1
25 TABLET, FILM COATED ORAL DAILY
Start: 2019-05-30 | End: 2019-05-30

## 2019-05-30 RX ORDER — INSULIN GLARGINE 100 [IU]/ML
35 INJECTION, SOLUTION SUBCUTANEOUS NIGHTLY
Start: 2019-05-30

## 2019-05-30 RX ORDER — INSULIN GLARGINE 100 [IU]/ML
35 INJECTION, SOLUTION SUBCUTANEOUS NIGHTLY
Start: 2019-05-30 | End: 2019-05-30 | Stop reason: SDUPTHER

## 2019-05-30 RX ADMIN — FUROSEMIDE 40 MG: 40 TABLET ORAL at 08:05

## 2019-05-30 RX ADMIN — CLOPIDOGREL BISULFATE 75 MG: 75 TABLET ORAL at 08:05

## 2019-05-30 RX ADMIN — HYDRALAZINE HYDROCHLORIDE 10 MG: 20 INJECTION, SOLUTION INTRAMUSCULAR; INTRAVENOUS at 05:05

## 2019-05-30 RX ADMIN — HYDRALAZINE HYDROCHLORIDE 100 MG: 50 TABLET ORAL at 08:05

## 2019-05-30 RX ADMIN — METOPROLOL SUCCINATE 50 MG: 50 TABLET, EXTENDED RELEASE ORAL at 08:05

## 2019-05-30 RX ADMIN — AMLODIPINE BESYLATE 10 MG: 10 TABLET ORAL at 08:05

## 2019-05-30 RX ADMIN — PANTOPRAZOLE SODIUM 40 MG: 40 TABLET, DELAYED RELEASE ORAL at 08:05

## 2019-05-30 RX ADMIN — OCTREOTIDE ACETATE 50 MCG: 50 INJECTION, SOLUTION INTRAVENOUS; SUBCUTANEOUS at 05:05

## 2019-05-30 RX ADMIN — MICONAZOLE NITRATE: 20 CREAM TOPICAL at 08:05

## 2019-05-30 RX ADMIN — ISOSORBIDE DINITRATE 20 MG: 10 TABLET ORAL at 08:05

## 2019-05-30 RX ADMIN — HEPARIN SODIUM 5000 UNITS: 5000 INJECTION, SOLUTION INTRAVENOUS; SUBCUTANEOUS at 05:05

## 2019-05-30 NOTE — PLAN OF CARE
05/30/19 1025   Medicare Message   Important Message from Medicare regarding Discharge Appeal Rights Given to patient/caregiver;Signed/date by patient/caregiver;Explained to patient/caregiver   Date IMM was signed 05/30/19   Time IMM was signed 1023

## 2019-05-30 NOTE — PLAN OF CARE
We'll sign off at this point please call back for any questions.  Thank you very much for giving us the opportunity to take part in the care of this patient.

## 2019-05-30 NOTE — PROGRESS NOTES
[facility] -   Cardiology  Progress Note    Patient Name: Carlitos Ellington  MRN: [id number]  Admission Date: 5/28/2019  Hospital Length of Stay: 2 days  Code Status: DNR   Attending Physician: Car Odonnell MD   Primary Care Physician: Raza Uribe MD  Expected Discharge Date: 5/30/2019  Principal Problem:Acute hypoxemic respiratory failure    Subjective:   HPI  Pt admitted with CHF exacerbation.  Pt c/o increased dyspnea on exertion and at rest over the last several days (wife states has been longer duration).  Denies chest pain sxs.    He has h/o 4 v CABG, followed by outside Cardiology group.  Echo 4/19 showed EF 25%, mild AS, mild-mod MR.  Admit labs shows troponin leak 0.064, BNP 1406 and renal failure with creatinine 2.7  He feels better at time of consult after receiving IV lasix.  Has h/o multiple CVA.       Hospital Course:   5/29/19--patient seen and examined in room. No chest pain or SOB today. Feels good today. Much improved since admission. Labs reviewed, Cr 2.7, K+ 3.8.     5/30/19--Patient seen and examined in room, lying in bed. No chest pain or SOB today. Feels ready for discharge today. Labs reviewed, stable.     Interval History: no acute issues noted o/n. Seems stable cardiac wise, needs close cardiology follow up with primary cardiologist after discharge.     Review of Systems   Constitution: Positive for malaise/fatigue.   HENT: Negative for hearing loss and hoarse voice.    Eyes: Negative for blurred vision and visual disturbance.   Cardiovascular: Positive for dyspnea on exertion (improving), leg swelling (trace) and orthopnea (improving). Negative for chest pain, claudication, irregular heartbeat, near-syncope, palpitations, paroxysmal nocturnal dyspnea and syncope.   Respiratory: Positive for shortness of breath. Negative for cough, hemoptysis, sleep disturbances due to breathing, snoring and wheezing.    Endocrine: Negative for cold intolerance and heat intolerance.    Hematologic/Lymphatic: Bruises/bleeds easily.   Skin: Negative for color change, dry skin and nail changes.   Musculoskeletal: Positive for arthritis, back pain and joint pain. Negative for myalgias.   Gastrointestinal: Negative for bloating, abdominal pain, constipation, nausea and vomiting.   Genitourinary: Negative for dysuria, flank pain, hematuria and hesitancy.   Neurological: Positive for weakness. Negative for headaches, light-headedness, loss of balance, numbness and paresthesias.   Psychiatric/Behavioral: Negative for altered mental status.   Allergic/Immunologic: Negative for environmental allergies.     Objective:     Vital Signs (Most Recent):  Temp: 97.6 °F (36.4 °C) (05/30/19 0749)  Pulse: 75 (05/30/19 0750)  Resp: 18 (05/30/19 0749)  BP: (!) 168/65 (05/30/19 0750)  SpO2: (!) 94 % (05/30/19 1000) Vital Signs (24h Range):  Temp:  [96.2 °F (35.7 °C)-97.8 °F (36.6 °C)] 97.6 °F (36.4 °C)  Pulse:  [60-83] 75  Resp:  [16-18] 18  SpO2:  [92 %-98 %] 94 %  BP: (150-184)/(65-81) 168/65     Weight: 91.5 kg (201 lb 11.5 oz)  Body mass index is 28.94 kg/m².     SpO2: (!) 94 %  O2 Device (Oxygen Therapy): nasal cannula      Intake/Output Summary (Last 24 hours) at 5/30/2019 1104  Last data filed at 5/30/2019 0830  Gross per 24 hour   Intake 520 ml   Output 1300 ml   Net -780 ml       Lines/Drains/Airways     Peripheral Intravenous Line                 Peripheral IV - Single Lumen 05/28/19 0432 20 G Left Antecubital 2 days                Physical Exam   Constitutional: He is oriented to person, place, and time. He appears well-developed and well-nourished. No distress.   HENT:   Head: Normocephalic and atraumatic.   Eyes: Pupils are equal, round, and reactive to light.   Neck: Normal range of motion and full passive range of motion without pain. Neck supple. No JVD present.   Cardiovascular: Normal rate, regular rhythm, S1 normal, S2 normal and intact distal pulses. PMI is not displaced.   No murmur  heard.  Pulses:       Radial pulses are 2+ on the right side, and 2+ on the left side.        Dorsalis pedis pulses are 2+ on the right side, and 2+ on the left side.   Bp remains elevated today, medications adjusted   Pulmonary/Chest: Effort normal and breath sounds normal. No accessory muscle usage. No respiratory distress. He has no decreased breath sounds. He has no wheezes. He has no rales.   SOB improved significantly today   Abdominal: Soft. Bowel sounds are normal. He exhibits no distension. There is no tenderness.   +obese   Musculoskeletal: Normal range of motion. He exhibits no edema.        Right ankle: He exhibits no swelling.        Left ankle: He exhibits no swelling.   Neurological: He is alert and oriented to person, place, and time.   Skin: Skin is warm and dry. He is not diaphoretic. No cyanosis. Nails show no clubbing.   Psychiatric: He has a normal mood and affect. His speech is normal and behavior is normal. Judgment and thought content normal. Cognition and memory are normal.   Nursing note and vitals reviewed.      Significant Labs:   BMP:   Recent Labs   Lab 05/29/19  0433 05/30/19  0442   GLU 83 92    140   K 3.8 4.8    106   CO2 24 26   BUN 24* 24*   CREATININE 2.7* 2.8*   CALCIUM 9.1 9.6   , CBC   Recent Labs   Lab 05/29/19  0433 05/30/19  0442   WBC 8.48 7.54   HGB 11.7* 12.5*   HCT 36.1* 38.7*    319    and All pertinent lab results from the last 24 hours have been reviewed.    Significant Imaging: Echocardiogram:   2D echo with color flow doppler:   Results for orders placed or performed during the hospital encounter of 05/28/19   2D echo with color flow doppler   Result Value Ref Range    QEF 25 (A) 55 - 65    Mitral Valve Regurgitation MILD     Aortic Valve Stenosis MILD (A)     Tricuspid Valve Regurgitation MILD     and X-Ray: CXR: X-Ray Chest 1 View (CXR): No results found for this visit on 05/28/19.    Assessment and Plan:       CAD, multiple vessel  Continue  current medical management for now    Abnormal ECG  See management plan detailed above.     Acute on chronic combined systolic and diastolic heart failure  Echo revealed EF 25-30%, mild AS, similar to previous ECHO findings  Continue BB, Hydralazine, Isordil, Norvasc, Lasix  NO ACEi/ARB given renal function  Dash diet, 2 gm sodium restriction  55-60 ozs fluid restriction  Daily weights  Strict intake and output  Close cardiology follow up after discharge      Elevated troponin  Likely secondary to demand ischemia from decompensated CHF and CKD issues  Continue current medical management for now    Chronic kidney disease, stage IV (severe)  Monitor closely, stable    Aortic stenosis, mild  Avoid hypotensive episodes  Continue BB, Norvasc, Lasix daily, Hydralazine, Isordil    Moderate mitral regurgitation  See management plan detailed above.     History of CVA (cerebrovascular accident)  Continue Plavix and statin    Hypertension associated with diabetes  On medical therapy  Continue BB, Norvasc  No ACEi/ARB given renal function        VTE Risk Mitigation (From admission, onward)        Ordered     heparin (porcine) injection 5,000 Units  Every 8 hours      05/28/19 0888          Cailin Coker, CYNTHIA-C  Cardiology  [facility] [place]

## 2019-05-30 NOTE — PLAN OF CARE
Problem: Adult Inpatient Plan of Care  Goal: Plan of Care Review  POC reviewed, verbalized understanding. Pt remained free from falls, fall precautions in place. Pt is SR  on monitor,. VSS.  B/P alittle elevated scheduled meds given  Along with prn  No other c/o at this time. PIV intact blood sugar stable no sliding scale yet. Possible d/c this am Call bell and personal belongings within reach.  Bed alarm set Hourly rounding complete. Reminded to call for assistance. Will continue to monitor.

## 2019-05-30 NOTE — SUBJECTIVE & OBJECTIVE
Interval History: no acute issues noted o/n. Seems stable cardiac wise, needs close cardiology follow up with primary cardiologist after discharge.     Review of Systems   Constitution: Positive for malaise/fatigue.   HENT: Negative for hearing loss and hoarse voice.    Eyes: Negative for blurred vision and visual disturbance.   Cardiovascular: Positive for dyspnea on exertion (improving), leg swelling (trace) and orthopnea (improving). Negative for chest pain, claudication, irregular heartbeat, near-syncope, palpitations, paroxysmal nocturnal dyspnea and syncope.   Respiratory: Positive for shortness of breath. Negative for cough, hemoptysis, sleep disturbances due to breathing, snoring and wheezing.    Endocrine: Negative for cold intolerance and heat intolerance.   Hematologic/Lymphatic: Bruises/bleeds easily.   Skin: Negative for color change, dry skin and nail changes.   Musculoskeletal: Positive for arthritis, back pain and joint pain. Negative for myalgias.   Gastrointestinal: Negative for bloating, abdominal pain, constipation, nausea and vomiting.   Genitourinary: Negative for dysuria, flank pain, hematuria and hesitancy.   Neurological: Positive for weakness. Negative for headaches, light-headedness, loss of balance, numbness and paresthesias.   Psychiatric/Behavioral: Negative for altered mental status.   Allergic/Immunologic: Negative for environmental allergies.     Objective:     Vital Signs (Most Recent):  Temp: 97.6 °F (36.4 °C) (05/30/19 0749)  Pulse: 75 (05/30/19 0750)  Resp: 18 (05/30/19 0749)  BP: (!) 168/65 (05/30/19 0750)  SpO2: (!) 94 % (05/30/19 1000) Vital Signs (24h Range):  Temp:  [96.2 °F (35.7 °C)-97.8 °F (36.6 °C)] 97.6 °F (36.4 °C)  Pulse:  [60-83] 75  Resp:  [16-18] 18  SpO2:  [92 %-98 %] 94 %  BP: (150-184)/(65-81) 168/65     Weight: 91.5 kg (201 lb 11.5 oz)  Body mass index is 28.94 kg/m².     SpO2: (!) 94 %  O2 Device (Oxygen Therapy): nasal cannula      Intake/Output Summary (Last  24 hours) at 5/30/2019 1104  Last data filed at 5/30/2019 0830  Gross per 24 hour   Intake 520 ml   Output 1300 ml   Net -780 ml       Lines/Drains/Airways     Peripheral Intravenous Line                 Peripheral IV - Single Lumen 05/28/19 0432 20 G Left Antecubital 2 days                Physical Exam   Constitutional: He is oriented to person, place, and time. He appears well-developed and well-nourished. No distress.   HENT:   Head: Normocephalic and atraumatic.   Eyes: Pupils are equal, round, and reactive to light.   Neck: Normal range of motion and full passive range of motion without pain. Neck supple. No JVD present.   Cardiovascular: Normal rate, regular rhythm, S1 normal, S2 normal and intact distal pulses. PMI is not displaced.   No murmur heard.  Pulses:       Radial pulses are 2+ on the right side, and 2+ on the left side.        Dorsalis pedis pulses are 2+ on the right side, and 2+ on the left side.   Bp remains elevated today, medications adjusted   Pulmonary/Chest: Effort normal and breath sounds normal. No accessory muscle usage. No respiratory distress. He has no decreased breath sounds. He has no wheezes. He has no rales.   SOB improved significantly today   Abdominal: Soft. Bowel sounds are normal. He exhibits no distension. There is no tenderness.   +obese   Musculoskeletal: Normal range of motion. He exhibits no edema.        Right ankle: He exhibits no swelling.        Left ankle: He exhibits no swelling.   Neurological: He is alert and oriented to person, place, and time.   Skin: Skin is warm and dry. He is not diaphoretic. No cyanosis. Nails show no clubbing.   Psychiatric: He has a normal mood and affect. His speech is normal and behavior is normal. Judgment and thought content normal. Cognition and memory are normal.   Nursing note and vitals reviewed.      Significant Labs:   BMP:   Recent Labs   Lab 05/29/19  0433 05/30/19  0442   GLU 83 92    140   K 3.8 4.8    106   CO2  24 26   BUN 24* 24*   CREATININE 2.7* 2.8*   CALCIUM 9.1 9.6   , CBC   Recent Labs   Lab 05/29/19  0433 05/30/19  0442   WBC 8.48 7.54   HGB 11.7* 12.5*   HCT 36.1* 38.7*    319    and All pertinent lab results from the last 24 hours have been reviewed.    Significant Imaging: Echocardiogram:   2D echo with color flow doppler:   Results for orders placed or performed during the hospital encounter of 05/28/19   2D echo with color flow doppler   Result Value Ref Range    QEF 25 (A) 55 - 65    Mitral Valve Regurgitation MILD     Aortic Valve Stenosis MILD (A)     Tricuspid Valve Regurgitation MILD     and X-Ray: CXR: X-Ray Chest 1 View (CXR): No results found for this visit on 05/28/19.

## 2019-05-30 NOTE — PROGRESS NOTES
Home Oxygen Evaluation    Date Performed: 5/30/2019    1) Patient's Home O2 Sat on room air, while at rest: 85        If O2 sats on room air at rest are 88% or below, patient qualifies. No additional testing needed. Document N/A in steps 2 and 3. If 89% or above, complete steps 2.      2) Patient's O2 Sat on room air while exercising:         If O2 sats on room air while exercising remain 89% or above patient does not qualify, no further testing needed Document N/A in step 3. If O2 sats on room air while exercising are 88% or below, continue to step 3.      3) Patient's O2 Sat while exercising on O2: NA         (Must show improvement from #2 for patients to qualify)    If O2 sats improve on oxygen, patient qualifies for portable oxygen. If not, the patient does not qualify.

## 2019-05-31 ENCOUNTER — PATIENT OUTREACH (OUTPATIENT)
Dept: ADMINISTRATIVE | Facility: CLINIC | Age: 79
End: 2019-05-31

## 2019-05-31 ENCOUNTER — TELEPHONE (OUTPATIENT)
Dept: INTERNAL MEDICINE | Facility: CLINIC | Age: 79
End: 2019-05-31

## 2019-05-31 NOTE — DISCHARGE SUMMARY
[facility] Hospital Medicine  Discharge Summary      Patient Name: Carlitos Ellington  MRN: [id number]  Admission Date: 5/28/2019  Hospital Length of Stay: 2 days  Discharge Date and Time: 5/30/2019  1:56 PM  Attending Physician:  Car Odonnell MD  Discharging Provider: Car Odonnell MD  Primary Care Provider: Raza Uribe MD      HPI:   Came to the Emergency Room for shortness of breath worsening over the last couple days.  Worse at rest and with exertion.  He endorses orthopnea at baseline.  Denies any other recent illness.  He was recently started on Glipizide and was hypoglycemic in the ED.                   History of Present Illness: Carlitos Ellington is a 78 y.o. male patient with a PMHx of carotid stenosis, CKD, CHF, DM, CBA, HLD, HTN, PSHx of CABGx4 who presents to the Emergency Department for orthopnea which onset gradually 2 months ago and worsened 2-3 days ago. Symptoms are worsening and severe. Pt takes lasix every 3 days. Associated sxs include diaphoresis. Patient denies any fever, chills, cough, congestion, CP, BLE edema/pain, palpitations, n/v, extremity weakness/numbness, dizziness, recent travel/long fransico rides, and all other sxs at this time. No further complaints or concerns at this time.        * No surgery found *      Hospital Course:   Admitted for evaluation and treatment of shortness of breath due to heart failure exacerbation.  Started IV Furosemide and dietary Sodium and fluid restriction.  Octreotide 50 mcg every 6 hours for hypoglycemia.  Cardiology evaluated and following with recommendations.  Symptoms improved with diuresis.  Blood sugars returned to normal or marginally high.  Discharge plan to return home.  Stop taking Glipizide.  Reduce Lantus from 50 units daily to 35 and follow up with PCP.     Consults:   Consults (From admission, onward)        Status Ordering Provider     Inpatient consult to Cardiology  Once     Provider:  Yandel Raman MD     Completed LADAN DOMINGO     Inpatient consult to Pulmonology  Once     Provider:  Tyler Rice MD    Completed LADAN DOMINGO          No new Assessment & Plan notes have been filed under this hospital service since the last note was generated.  Service: Hospital Medicine    Final Active Diagnoses:    Diagnosis Date Noted POA    PRINCIPAL PROBLEM:  Acute hypoxemic respiratory failure [J96.01] 05/28/2019 Yes    Hypoglycemia [E16.2] 05/28/2019 Yes    Acute on chronic combined systolic and diastolic heart failure [I50.43] 05/28/2019 Yes    Chronic kidney disease, stage IV (severe) [N18.4]  Yes    Hypertension associated with diabetes [E11.59, I10]  Yes    History of CVA (cerebrovascular accident) [Z86.73] 11/01/2018 Not Applicable    ACC/AHA stage C congestive heart failure due to ischemic cardiomyopathy [I50.9, I25.5] 05/28/2019 Yes    Anemia, unspecified [D64.9] 05/28/2019 Yes    Elevated troponin [R74.8] 05/28/2019 Yes    Abnormal ECG [R94.31] 05/28/2019 Yes    Hx of CABG [Z95.1] 05/28/2019 Not Applicable    CAD, multiple vessel [I25.10] 05/28/2019 Yes    Aortic stenosis, mild [I35.0]  Yes    Type II diabetes mellitus with renal manifestations [E11.29]  Yes    Moderate mitral regurgitation [I34.0]  Yes    Carotid stenosis [I65.29]  Yes    GERD (gastroesophageal reflux disease) [K21.9]  Yes      Problems Resolved During this Admission:       Discharged Condition: good    Disposition: Home or Self Care    Follow Up:  Follow-up Information     Raza Uribe MD In 2 weeks.    Specialty:  Internal Medicine  Why:  Hospital follow up HF exacerbation  Contact information:  [address]  [address]  [facility] [address]  [phone]                 Patient Instructions:      OXYGEN FOR HOME USE     Order Specific Question Answer Comments   Liter Flow 2    Duration Continuous    Qualifying SpO2: 85%    Testing done at: Rest    Device home concentrator with portable unit    Length of need  "(in months): 99 mos    Patient condition with qualifying saturation CHF    Height: 5' 10" (1.778 m)    Weight: 91.5 kg (201 lb 11.5 oz)    Does patient have medical equipment at home? walker, rolling    Alternative treatment measures have been tried or considered and deemed clinically ineffective. Yes      Diet Cardiac   Order Comments: Diabetic Diet     Activity as tolerated       Significant Diagnostic Studies: Labs: All labs within the past 24 hours have been reviewed    Pending Diagnostic Studies:     None         Medications:  Reconciled Home Medications:      Medication List      START taking these medications    insulin glargine 100 unit/mL injection  Commonly known as:  LANTUS  Inject 35 Units into the skin every evening.        CHANGE how you take these medications    sertraline 25 MG tablet  Commonly known as:  ZOLOFT  Take 1 tablet (25 mg total) by mouth once daily.  What changed:    · medication strength  · when to take this        CONTINUE taking these medications    ACCU-CHEK MASHA PLUS TEST STRP Strp  Generic drug:  blood sugar diagnostic     amLODIPine 10 MG tablet  Commonly known as:  NORVASC  Take 10 mg by mouth once daily.     aspirin 325 MG tablet  Take 81 mg by mouth once daily.     clopidogrel 75 mg tablet  Commonly known as:  PLAVIX  Take 1 tablet (75 mg total) by mouth once daily.     escitalopram oxalate 10 MG tablet  Commonly known as:  LEXAPRO  Take 1 tablet (10 mg total) by mouth once daily.     furosemide 40 MG tablet  Commonly known as:  LASIX  Take 40 mg by mouth once daily.     hydrALAZINE 100 MG tablet  Commonly known as:  APRESOLINE  Take 1 tablet (100 mg total) by mouth 2 (two) times daily.     isosorbide dinitrate 20 MG tablet  Commonly known as:  ISORDIL  Take 1 tablet (20 mg total) by mouth 3 (three) times daily.     metoprolol succinate 50 MG 24 hr tablet  Commonly known as:  TOPROL-XL  Take 50 mg by mouth once daily.     omeprazole 20 MG capsule  Commonly known as:  " PRILOSEC  Take 20 mg by mouth once daily.     ondansetron 4 MG Tbdl  Commonly known as:  ZOFRAN-ODT  Take 1 tablet (4 mg total) by mouth every 8 (eight) hours as needed (prn nausea/vomiting).     prazosin 2 MG Cap  Commonly known as:  MINIPRESS  Take 1 mg by mouth 2 (two) times daily.     prednisoLONE acetate 1 % Drps  Commonly known as:  PRED FORTE     rosuvastatin 40 MG Tab  Commonly known as:  CRESTOR  Take 1 tablet (40 mg total) by mouth every evening.        STOP taking these medications    glipiZIDE 10 MG tablet  Commonly known as:  GLUCOTROL            Indwelling Lines/Drains at time of discharge:   Lines/Drains/Airways          None          Time spent on the discharge of patient: 30 minutes  Patient was seen and examined on the date of discharge and determined to be suitable for discharge.         Car Odonnell MD  Department of Hospital Medicine  [facility]

## 2019-05-31 NOTE — PHYSICIAN QUERY
"PT Name: Carlitos Ellington  MR #: [id number]    Physician Query Form - Heart  Condition Clarification     CDS/: Ben Alatorre               Contact information:   [email or web address]    This form is a permanent document in the medical record.     Query Date: May 31, 2019    By submitting this query, we are merely seeking further clarification of documentation. Please utilize your independent clinical judgment when addressing the question(s) below.    The medical record contains the following   Indicators     Supporting Clinical Findings Location in Medical Record   x BNP 1406    5/28  Labs      EF     x Radiology findings 1.  The borders of the heart are not well seen.....This is characteristic of pulmonary edema.....This is characteristic of a tiny pleural effusion.   5/28  CXR impression   x Echo Results   1 - Severely depressed left ventricular systolic function (EF 25-30%).     2 - Indeterminate LV diastolic function.  5/28   2D echo    "Ascites" documented     x "SOB" or "WILLIS" documented Shortness of Breath---WILLIS     Pt c/o increased dyspnea on exertion and at rest over the last several days (wife states has been longer duration).   5/28  ED provider note    5/30  Cardiology-- consult     x "Hypoxia" documented Acute hypoxemic respiratory failure  Due to acute heart failure exacerbation   5/28  H&P   x Heart Failure documented Congestive heart failure, NYHA class 3, chronic, systolic  ----Coronary artery disease    Acute on chronic combined systolic and diastolic heart failure----Continuing diuresis and fluid/Sodium restriction.  Daily weights and strict I&O.  Cardiology to evaluate.    Acute on chronic combined systolic and diastolic heart failure:  Echo revealed EF 25-30%, mild AS, similar to previous ECHO findings;  Continue BB, Hydralazine, Isordil, Norvasc, Lasix   5/28 H&P      5/28  H&P          5/30  Cardiology-- consult   x "Edema" documented Cardiovascular: Positive for dyspnea on " "exertion (improving), leg swelling (trace) and orthopnea (improving 5/30  Cardiology-- consult   x Diuretics/Meds Continue BB, Hydralazine, Isordil, Norvasc, Lasix  NO ACEi/ARB given renal function    Metoprolol succinate, oral; given on 5/29-30  Isosorbide mononitrate, oral; given on 5/28  Isosorbide dinitrate, oral; given on 5/29-5/30  Furosemide, 40mg, oral; given on 5/29, 5/30  Furosemide, 80 mg, IV; given on 5/28 5/30 Cardiology-- consult      MAR  "  "  "  "    Treatment:      Other:      Heart failure (HF) can be acute, chronic or both. It is generally further specificed as systolic, diastolic, or combined. Lastly, it is important to identify an underlying etiology if known or suspected.     Common clues to acute exacerbation:  Rapidly progressive symptoms (w/in 2 weeks of presentation), using IV diuretics to treat, using supplemental O2, pulmonary edema on Xray, MI w/in 4 weeks, and/or BNP >500    Systolic Heart Failure: is defined as chart documentation of a left ventricular ejection fraction (LVEF) less than 40%     Diastolic Heart Failure: is defined as a left ventricular ejection fraction (LVEF) greater than 40%   +      Evidence of diastolic dysfunction on echocardiography OR    Right heart catheterization wedge pressure above 12 mm Hg OR    Left heart catheterization left ventricular end diastolic pressure 18 mm Hg or above.    References: *American Heart Association    The clinical guidelines noted below are only system guidelines, and do not replace the providers clinical judgment.     Provider, due to conflicting documentation,  please specify the diagnosis associated with above clinical findings    [ X  ] Acute on Chronic Systolic Heart Failure- Pre-existing systolic HF diagnosis.  EF < 40%  and acute HF symptoms documented  [   ] Chronic Systolic Heart Failure - Pre-existing systolic HF diagnosis.  EF < 40%  without  acute HF symptoms documented  [   ] Acute on Chronic Combined Systolic and " Diastolic Heart Failure                   [   ] Other Type of Heart Failure (please specify type): _________________________  [   ] Other (please specify): ___________________________________  [  ] Clinically Undetermined                          Please document in your progress notes daily for the duration of treatment until resolved and include in your discharge summary.

## 2019-05-31 NOTE — PROGRESS NOTES
C3 nurse attempted to contact patient. No answer.  C3 nurse attempted to contact Carlitos Ellington for a TCC post hospital discharge follow up call. No answer at phone number listed and no voicemail available. The patient does not have a scheduled HOSFU appointment within 7 days post hospital discharge date 5/30. Message sent to Physician staff to assist with HOSFU appointment scheduling.

## 2019-05-31 NOTE — PLAN OF CARE
05/31/19 0939   Final Note   Assessment Type Final Discharge Note   Anticipated Discharge Disposition Home   Right Care Referral Info   Post Acute Recommendation No Care

## 2019-06-26 ENCOUNTER — OFFICE VISIT (OUTPATIENT)
Dept: INTERNAL MEDICINE | Facility: CLINIC | Age: 79
End: 2019-06-26
Payer: MEDICARE

## 2019-06-26 ENCOUNTER — LAB VISIT (OUTPATIENT)
Dept: LAB | Facility: HOSPITAL | Age: 79
End: 2019-06-26
Attending: INTERNAL MEDICINE
Payer: MEDICARE

## 2019-06-26 VITALS
WEIGHT: 212.94 LBS | SYSTOLIC BLOOD PRESSURE: 142 MMHG | BODY MASS INDEX: 30.49 KG/M2 | DIASTOLIC BLOOD PRESSURE: 70 MMHG | HEIGHT: 70 IN | HEART RATE: 60 BPM | OXYGEN SATURATION: 95 % | TEMPERATURE: 98 F

## 2019-06-26 DIAGNOSIS — Z79.4 TYPE 2 DIABETES MELLITUS WITH STAGE 4 CHRONIC KIDNEY DISEASE, WITH LONG-TERM CURRENT USE OF INSULIN: ICD-10-CM

## 2019-06-26 DIAGNOSIS — E11.69 HYPERLIPIDEMIA ASSOCIATED WITH TYPE 2 DIABETES MELLITUS: ICD-10-CM

## 2019-06-26 DIAGNOSIS — E11.59 HYPERTENSION ASSOCIATED WITH DIABETES: ICD-10-CM

## 2019-06-26 DIAGNOSIS — N18.4 TYPE 2 DIABETES MELLITUS WITH STAGE 4 CHRONIC KIDNEY DISEASE, WITH LONG-TERM CURRENT USE OF INSULIN: Primary | ICD-10-CM

## 2019-06-26 DIAGNOSIS — N18.4 TYPE 2 DIABETES MELLITUS WITH STAGE 4 CHRONIC KIDNEY DISEASE, WITH LONG-TERM CURRENT USE OF INSULIN: ICD-10-CM

## 2019-06-26 DIAGNOSIS — E11.22 TYPE 2 DIABETES MELLITUS WITH STAGE 4 CHRONIC KIDNEY DISEASE, WITH LONG-TERM CURRENT USE OF INSULIN: ICD-10-CM

## 2019-06-26 DIAGNOSIS — I15.2 HYPERTENSION ASSOCIATED WITH DIABETES: ICD-10-CM

## 2019-06-26 DIAGNOSIS — Z79.4 TYPE 2 DIABETES MELLITUS WITH STAGE 4 CHRONIC KIDNEY DISEASE, WITH LONG-TERM CURRENT USE OF INSULIN: Primary | ICD-10-CM

## 2019-06-26 DIAGNOSIS — E78.5 HYPERLIPIDEMIA ASSOCIATED WITH TYPE 2 DIABETES MELLITUS: ICD-10-CM

## 2019-06-26 DIAGNOSIS — E11.22 TYPE 2 DIABETES MELLITUS WITH STAGE 4 CHRONIC KIDNEY DISEASE, WITH LONG-TERM CURRENT USE OF INSULIN: Primary | ICD-10-CM

## 2019-06-26 DIAGNOSIS — E11.9 DIABETES MELLITUS WITH NO COMPLICATION: ICD-10-CM

## 2019-06-26 LAB
ANION GAP SERPL CALC-SCNC: 6 MMOL/L (ref 8–16)
BASOPHILS # BLD AUTO: 0.04 K/UL (ref 0–0.2)
BASOPHILS NFR BLD: 0.6 % (ref 0–1.9)
BNP SERPL-MCNC: 1015 PG/ML (ref 0–99)
BUN SERPL-MCNC: 25 MG/DL (ref 8–23)
CALCIUM SERPL-MCNC: 9.6 MG/DL (ref 8.7–10.5)
CHLORIDE SERPL-SCNC: 106 MMOL/L (ref 95–110)
CO2 SERPL-SCNC: 28 MMOL/L (ref 23–29)
CREAT SERPL-MCNC: 2.5 MG/DL (ref 0.5–1.4)
DIFFERENTIAL METHOD: ABNORMAL
EOSINOPHIL # BLD AUTO: 0.1 K/UL (ref 0–0.5)
EOSINOPHIL NFR BLD: 2 % (ref 0–8)
ERYTHROCYTE [DISTWIDTH] IN BLOOD BY AUTOMATED COUNT: 14.9 % (ref 11.5–14.5)
EST. GFR  (AFRICAN AMERICAN): 27.4 ML/MIN/1.73 M^2
EST. GFR  (NON AFRICAN AMERICAN): 23.7 ML/MIN/1.73 M^2
ESTIMATED AVG GLUCOSE: 180 MG/DL (ref 68–131)
GLUCOSE SERPL-MCNC: 113 MG/DL (ref 70–110)
HBA1C MFR BLD HPLC: 7.9 % (ref 4–5.6)
HCT VFR BLD AUTO: 36 % (ref 40–54)
HGB BLD-MCNC: 11.1 G/DL (ref 14–18)
IMM GRANULOCYTES # BLD AUTO: 0.02 K/UL (ref 0–0.04)
IMM GRANULOCYTES NFR BLD AUTO: 0.3 % (ref 0–0.5)
LYMPHOCYTES # BLD AUTO: 0.7 K/UL (ref 1–4.8)
LYMPHOCYTES NFR BLD: 9.2 % (ref 18–48)
MCH RBC QN AUTO: 28.8 PG (ref 27–31)
MCHC RBC AUTO-ENTMCNC: 30.8 G/DL (ref 32–36)
MCV RBC AUTO: 94 FL (ref 82–98)
MONOCYTES # BLD AUTO: 0.8 K/UL (ref 0.3–1)
MONOCYTES NFR BLD: 11.2 % (ref 4–15)
NEUTROPHILS # BLD AUTO: 5.5 K/UL (ref 1.8–7.7)
NEUTROPHILS NFR BLD: 76.7 % (ref 38–73)
NRBC BLD-RTO: 0 /100 WBC
PLATELET # BLD AUTO: 250 K/UL (ref 150–350)
PMV BLD AUTO: 10.8 FL (ref 9.2–12.9)
POTASSIUM SERPL-SCNC: 4.7 MMOL/L (ref 3.5–5.1)
RBC # BLD AUTO: 3.85 M/UL (ref 4.6–6.2)
SODIUM SERPL-SCNC: 140 MMOL/L (ref 136–145)
WBC # BLD AUTO: 7.17 K/UL (ref 3.9–12.7)

## 2019-06-26 PROCEDURE — 99214 PR OFFICE/OUTPT VISIT, EST, LEVL IV, 30-39 MIN: ICD-10-PCS | Mod: HCNC,S$GLB,, | Performed by: INTERNAL MEDICINE

## 2019-06-26 PROCEDURE — 99214 OFFICE O/P EST MOD 30 MIN: CPT | Mod: HCNC,S$GLB,, | Performed by: INTERNAL MEDICINE

## 2019-06-26 PROCEDURE — 99999 PR PBB SHADOW E&M-EST. PATIENT-LVL III: ICD-10-PCS | Mod: PBBFAC,HCNC,, | Performed by: INTERNAL MEDICINE

## 2019-06-26 PROCEDURE — 99499 UNLISTED E&M SERVICE: CPT | Mod: HCNC,S$GLB,, | Performed by: INTERNAL MEDICINE

## 2019-06-26 PROCEDURE — 80048 BASIC METABOLIC PNL TOTAL CA: CPT | Mod: HCNC

## 2019-06-26 PROCEDURE — 83036 HEMOGLOBIN GLYCOSYLATED A1C: CPT | Mod: HCNC

## 2019-06-26 PROCEDURE — 36415 COLL VENOUS BLD VENIPUNCTURE: CPT | Mod: HCNC,PO

## 2019-06-26 PROCEDURE — 85025 COMPLETE CBC W/AUTO DIFF WBC: CPT | Mod: HCNC

## 2019-06-26 PROCEDURE — 99499 RISK ADDL DX/OHS AUDIT: ICD-10-PCS | Mod: HCNC,S$GLB,, | Performed by: INTERNAL MEDICINE

## 2019-06-26 PROCEDURE — 99999 PR PBB SHADOW E&M-EST. PATIENT-LVL III: CPT | Mod: PBBFAC,HCNC,, | Performed by: INTERNAL MEDICINE

## 2019-06-26 PROCEDURE — 83880 ASSAY OF NATRIURETIC PEPTIDE: CPT | Mod: HCNC

## 2019-06-26 RX ORDER — FUROSEMIDE 40 MG/1
40 TABLET ORAL DAILY
Qty: 90 TABLET | Refills: 3 | Status: SHIPPED | OUTPATIENT
Start: 2019-06-26

## 2019-06-26 RX ORDER — SERTRALINE HYDROCHLORIDE 50 MG/1
50 TABLET, FILM COATED ORAL DAILY
Qty: 90 TABLET | Refills: 3 | Status: SHIPPED | OUTPATIENT
Start: 2019-06-26 | End: 2020-06-25

## 2019-06-26 NOTE — PROGRESS NOTES
"HPI:  Patient is a 78-year-old man who comes today for hospital follow-up.  He has been in the hospital for exacerbation of heart failure.  I reviewed all the hospital records.  He has been home now for about a month.  He is not checking his weights at home.  He is also not checking his blood sugar.  He has been very depressed according to his wife.  He is now on home O2 continuously.  He was taken off some of his oral diabetic medications due to hypoglycemia.    Current meds have been verified and updated per the EMR  Exam:BP (!) 142/70   Pulse 60   Temp 97.7 °F (36.5 °C)   Ht 5' 10" (1.778 m)   Wt 96.6 kg (212 lb 15.4 oz)   SpO2 95% Comment: on 2 liters of oxygen  BMI 30.56 kg/m²   Chest clear  Cardiovascular regular rate and rhythm with 1-2/6 systolic murmur.  Extremities show just trace of edema    Lab Results   Component Value Date    WBC 7.54 05/30/2019    HGB 12.5 (L) 05/30/2019    HCT 38.7 (L) 05/30/2019     05/30/2019    CHOL 249 (H) 04/30/2019    TRIG 118 04/30/2019    HDL 34 (L) 04/30/2019    ALT 13 05/28/2019    AST 11 05/28/2019     05/30/2019    K 4.8 05/30/2019     05/30/2019    CREATININE 2.8 (H) 05/30/2019    BUN 24 (H) 05/30/2019    CO2 26 05/30/2019    TSH 1.017 04/30/2019    INR 0.9 05/11/2019    HGBA1C 8.8 (H) 04/30/2019       Impression:  Multiple medical problems below, stable  Patient Active Problem List   Diagnosis    Diabetes mellitus with no complication    Hypertension associated with diabetes    Hyperlipidemia associated with type 2 diabetes mellitus    GERD (gastroesophageal reflux disease)    Carotid stenosis    History of CVA (cerebrovascular accident)    Moderate mitral regurgitation    Aortic stenosis, mild    Congestive heart failure, NYHA class 3, chronic, systolic    Chronic kidney disease, stage IV (severe)    Type II diabetes mellitus with renal manifestations    ACC/AHA stage C congestive heart failure due to ischemic cardiomyopathy    " CAD, multiple vessel       Plan:  Orders Placed This Encounter    Basic metabolic panel    Hemoglobin A1c    CBC auto differential    Brain natriuretic peptide    furosemide (LASIX) 40 MG tablet    sertraline (ZOLOFT) 50 MG tablet     Patient will increase his Zoloft to 50 mg today.  He needs to be weighed himself but neck it every morning.  If his weight goes up by more than 4 lb, they need to call me.  Patient will have lab work done today.  He will see me in 6-8 weeks with additional lab work.  He also needs to start checking his blood sugars daily      Transitional Care Note    Family and/or Caretaker present at visit?  Yes  Diagnostic tests reviewed/disposition: No diagnosic tests pending after this hospitalization.  Disease/illness education: yes  Home health/community services discussion/referrals: Patient does not have home health established from hospital visit.  They do not need home health.  If needed, we will set up home health for the patient.   Establishment or re-establishment of referral orders for community resources: No other necessary community resources.   Discussion with other health care providers:not needed          This note is generated with speech recognition software and is subject to transcription error and sound alike phrases that may be missed by proofreading.

## 2019-06-27 ENCOUNTER — TELEPHONE (OUTPATIENT)
Dept: INTERNAL MEDICINE | Facility: CLINIC | Age: 79
End: 2019-06-27

## 2019-06-27 NOTE — TELEPHONE ENCOUNTER
Attempt to contact pt , call cannot be completed no emergency contact number is same as pt . Will mail letter to have pt call the office .

## 2019-06-27 NOTE — TELEPHONE ENCOUNTER
----- Message from Raza Uribe MD sent at 6/27/2019  5:55 AM CDT -----  Your lab work looks better than when you were last in the hospital. Stay on your current meds.

## 2019-07-19 ENCOUNTER — TELEPHONE (OUTPATIENT)
Dept: INTERNAL MEDICINE | Facility: CLINIC | Age: 79
End: 2019-07-19

## 2019-07-19 NOTE — TELEPHONE ENCOUNTER
----- Message from Aspen Frausto sent at 7/19/2019 12:47 PM CDT -----  Contact: Yodit Ellington   ph# [phone]   Chandana states he needs a Rx for smaller breathing machine, please contact Mrs Ellington when ready.

## 2019-07-19 NOTE — TELEPHONE ENCOUNTER
They need to contact his vendor, supplier of his durable for his home oxygen and find out the exact name of the unit that they wanting to get so I can write the order.  I have personally have no idea what is available to them through his medical insurance.  His vendor will know what is available and what is paid for by his insurance

## 2019-07-23 ENCOUNTER — TELEPHONE (OUTPATIENT)
Dept: INTERNAL MEDICINE | Facility: CLINIC | Age: 79
End: 2019-07-23

## 2019-07-23 NOTE — TELEPHONE ENCOUNTER
Informed the son that Dr. Uribe needs to know the exact name of machine they are requesting and how it is to be written per the Vendor. The son will get this information and bring it back to us.

## 2019-07-25 ENCOUNTER — TELEPHONE (OUTPATIENT)
Dept: INTERNAL MEDICINE | Facility: CLINIC | Age: 79
End: 2019-07-25

## 2019-07-25 NOTE — TELEPHONE ENCOUNTER
----- Message from Ammy Bowling sent at 7/25/2019  1:19 PM CDT -----  Contact: Pt wife   PT wife returning a missed call.   She can be reached at [phone] thanks

## 2019-07-25 NOTE — TELEPHONE ENCOUNTER
----- Message from Marcy Doss sent at 7/25/2019 12:50 PM CDT -----  Contact: Pt  .Type:  Patient Returning Call    Who Called: Pt   Who Left Message for Patient: Bob   Does the patient know what this is regarding?: return call   Would the patient rather a call back or a response via [facility]? Call back   Best Call Back Number: [phone] (  Additional Information:      Nida Chávez

## 2019-08-03 ENCOUNTER — HOSPITAL ENCOUNTER (INPATIENT)
Facility: HOSPITAL | Age: 79
LOS: 3 days | Discharge: HOSPICE | DRG: 280 | End: 2019-08-06
Attending: EMERGENCY MEDICINE | Admitting: INTERNAL MEDICINE
Payer: MEDICARE

## 2019-08-03 DIAGNOSIS — I50.9 ACUTE ON CHRONIC CONGESTIVE HEART FAILURE, UNSPECIFIED HEART FAILURE TYPE: ICD-10-CM

## 2019-08-03 DIAGNOSIS — R09.02 HYPOXEMIA: Primary | ICD-10-CM

## 2019-08-03 DIAGNOSIS — I50.9 CHF (CONGESTIVE HEART FAILURE): ICD-10-CM

## 2019-08-03 DIAGNOSIS — I50.20 SYSTOLIC HEART FAILURE: ICD-10-CM

## 2019-08-03 DIAGNOSIS — I25.10 CAD, MULTIPLE VESSEL: ICD-10-CM

## 2019-08-03 DIAGNOSIS — R79.89 ELEVATED TROPONIN: ICD-10-CM

## 2019-08-03 DIAGNOSIS — R09.02 HYPOXEMIA: ICD-10-CM

## 2019-08-03 DIAGNOSIS — R53.83 FATIGUE: ICD-10-CM

## 2019-08-03 DIAGNOSIS — N28.9 RENAL DYSFUNCTION: ICD-10-CM

## 2019-08-03 DIAGNOSIS — E11.9 DIABETES MELLITUS WITH NO COMPLICATION: ICD-10-CM

## 2019-08-03 PROBLEM — J18.9 PNEUMONIA: Status: ACTIVE | Noted: 2019-08-03

## 2019-08-03 PROBLEM — I50.43 ACUTE ON CHRONIC COMBINED SYSTOLIC AND DIASTOLIC HEART FAILURE: Status: ACTIVE | Noted: 2019-08-03

## 2019-08-03 LAB
ALBUMIN SERPL BCP-MCNC: 2.7 G/DL (ref 3.5–5.2)
ALLENS TEST: ABNORMAL
ALP SERPL-CCNC: 49 U/L (ref 55–135)
ALT SERPL W/O P-5'-P-CCNC: 18 U/L (ref 10–44)
AMORPH CRY URNS QL MICRO: NORMAL
ANION GAP SERPL CALC-SCNC: 7 MMOL/L (ref 8–16)
APTT BLDCRRT: 23.2 SEC (ref 21–32)
AST SERPL-CCNC: 14 U/L (ref 10–40)
BACTERIA #/AREA URNS HPF: NORMAL /HPF
BASOPHILS # BLD AUTO: 0.01 K/UL (ref 0–0.2)
BASOPHILS NFR BLD: 0.1 % (ref 0–1.9)
BILIRUB SERPL-MCNC: 0.4 MG/DL (ref 0.1–1)
BILIRUB UR QL STRIP: NEGATIVE
BNP SERPL-MCNC: 1711 PG/ML (ref 0–99)
BUN SERPL-MCNC: 35 MG/DL (ref 8–23)
CALCIUM SERPL-MCNC: 8 MG/DL (ref 8.7–10.5)
CHLORIDE SERPL-SCNC: 109 MMOL/L (ref 95–110)
CLARITY UR: CLEAR
CO2 SERPL-SCNC: 23 MMOL/L (ref 23–29)
COLOR UR: YELLOW
CREAT SERPL-MCNC: 2.8 MG/DL (ref 0.5–1.4)
DELSYS: ABNORMAL
DIFFERENTIAL METHOD: ABNORMAL
EOSINOPHIL # BLD AUTO: 0 K/UL (ref 0–0.5)
EOSINOPHIL NFR BLD: 0.1 % (ref 0–8)
ERYTHROCYTE [DISTWIDTH] IN BLOOD BY AUTOMATED COUNT: 14.9 % (ref 11.5–14.5)
EST. GFR  (AFRICAN AMERICAN): 24 ML/MIN/1.73 M^2
EST. GFR  (NON AFRICAN AMERICAN): 21 ML/MIN/1.73 M^2
FIO2: 28
FLOW: 2
GLUCOSE SERPL-MCNC: 161 MG/DL (ref 70–110)
GLUCOSE UR QL STRIP: ABNORMAL
HCO3 UR-SCNC: 22.7 MMOL/L (ref 24–28)
HCT VFR BLD AUTO: 29.9 % (ref 40–54)
HGB BLD-MCNC: 9.8 G/DL (ref 14–18)
HGB UR QL STRIP: NEGATIVE
HYALINE CASTS #/AREA URNS LPF: 0 /LPF
INR PPP: 0.9 (ref 0.8–1.2)
KETONES UR QL STRIP: NEGATIVE
LACTATE SERPL-SCNC: 0.6 MMOL/L (ref 0.5–2.2)
LEUKOCYTE ESTERASE UR QL STRIP: NEGATIVE
LYMPHOCYTES # BLD AUTO: 0.5 K/UL (ref 1–4.8)
LYMPHOCYTES NFR BLD: 5.3 % (ref 18–48)
MAGNESIUM SERPL-MCNC: 1.8 MG/DL (ref 1.6–2.6)
MCH RBC QN AUTO: 29.3 PG (ref 27–31)
MCHC RBC AUTO-ENTMCNC: 32.8 G/DL (ref 32–36)
MCV RBC AUTO: 90 FL (ref 82–98)
MICROSCOPIC COMMENT: NORMAL
MODE: ABNORMAL
MONOCYTES # BLD AUTO: 0.8 K/UL (ref 0.3–1)
MONOCYTES NFR BLD: 9.5 % (ref 4–15)
NEUTROPHILS # BLD AUTO: 7.5 K/UL (ref 1.8–7.7)
NEUTROPHILS NFR BLD: 85.2 % (ref 38–73)
NITRITE UR QL STRIP: NEGATIVE
PCO2 BLDA: 36 MMHG (ref 35–45)
PH SMN: 7.41 [PH] (ref 7.35–7.45)
PH UR STRIP: 6 [PH] (ref 5–8)
PHOSPHATE SERPL-MCNC: 3.2 MG/DL (ref 2.7–4.5)
PLATELET # BLD AUTO: 212 K/UL (ref 150–350)
PMV BLD AUTO: 9.9 FL (ref 9.2–12.9)
PO2 BLDA: 55 MMHG (ref 80–100)
POC BE: -2 MMOL/L
POC SATURATED O2: 89 % (ref 95–100)
POCT GLUCOSE: 106 MG/DL (ref 70–110)
POCT GLUCOSE: 177 MG/DL (ref 70–110)
POTASSIUM SERPL-SCNC: 3.8 MMOL/L (ref 3.5–5.1)
PROCALCITONIN SERPL IA-MCNC: 0.16 NG/ML
PROCALCITONIN SERPL IA-MCNC: 0.18 NG/ML
PROT SERPL-MCNC: 5.6 G/DL (ref 6–8.4)
PROT UR QL STRIP: ABNORMAL
PROTHROMBIN TIME: 10 SEC (ref 9–12.5)
RBC # BLD AUTO: 3.34 M/UL (ref 4.6–6.2)
RBC #/AREA URNS HPF: 1 /HPF (ref 0–4)
SAMPLE: ABNORMAL
SITE: ABNORMAL
SODIUM SERPL-SCNC: 139 MMOL/L (ref 136–145)
SP GR UR STRIP: 1.02 (ref 1–1.03)
TROPONIN I SERPL DL<=0.01 NG/ML-MCNC: 0.24 NG/ML (ref 0–0.03)
TROPONIN I SERPL DL<=0.01 NG/ML-MCNC: 0.32 NG/ML (ref 0–0.03)
TROPONIN I SERPL DL<=0.01 NG/ML-MCNC: 0.35 NG/ML (ref 0–0.03)
TROPONIN I SERPL DL<=0.01 NG/ML-MCNC: 0.36 NG/ML (ref 0–0.03)
URN SPEC COLLECT METH UR: ABNORMAL
UROBILINOGEN UR STRIP-ACNC: NEGATIVE EU/DL
WBC # BLD AUTO: 8.82 K/UL (ref 3.9–12.7)
WBC #/AREA URNS HPF: 1 /HPF (ref 0–5)

## 2019-08-03 PROCEDURE — 96365 THER/PROPH/DIAG IV INF INIT: CPT

## 2019-08-03 PROCEDURE — 85610 PROTHROMBIN TIME: CPT | Mod: HCNC

## 2019-08-03 PROCEDURE — 84484 ASSAY OF TROPONIN QUANT: CPT | Mod: HCNC

## 2019-08-03 PROCEDURE — 87040 BLOOD CULTURE FOR BACTERIA: CPT | Mod: HCNC

## 2019-08-03 PROCEDURE — 96375 TX/PRO/DX INJ NEW DRUG ADDON: CPT | Mod: HCNC

## 2019-08-03 PROCEDURE — 99900035 HC TECH TIME PER 15 MIN (STAT): Mod: HCNC

## 2019-08-03 PROCEDURE — 21400001 HC TELEMETRY ROOM: Mod: HCNC

## 2019-08-03 PROCEDURE — 63600175 PHARM REV CODE 636 W HCPCS: Mod: HCNC | Performed by: NURSE PRACTITIONER

## 2019-08-03 PROCEDURE — 84100 ASSAY OF PHOSPHORUS: CPT | Mod: HCNC

## 2019-08-03 PROCEDURE — 81000 URINALYSIS NONAUTO W/SCOPE: CPT | Mod: HCNC

## 2019-08-03 PROCEDURE — 93307 2D ECHO ONLY: ICD-10-PCS | Mod: 26,HCNC,, | Performed by: INTERNAL MEDICINE

## 2019-08-03 PROCEDURE — 63600175 PHARM REV CODE 636 W HCPCS: Mod: HCNC | Performed by: INTERNAL MEDICINE

## 2019-08-03 PROCEDURE — 63600175 PHARM REV CODE 636 W HCPCS: Mod: HCNC | Performed by: EMERGENCY MEDICINE

## 2019-08-03 PROCEDURE — P9612 CATHETERIZE FOR URINE SPEC: HCPCS | Mod: HCNC

## 2019-08-03 PROCEDURE — 25000003 PHARM REV CODE 250: Mod: HCNC | Performed by: NURSE PRACTITIONER

## 2019-08-03 PROCEDURE — 84484 ASSAY OF TROPONIN QUANT: CPT | Mod: 91,HCNC

## 2019-08-03 PROCEDURE — 85730 THROMBOPLASTIN TIME PARTIAL: CPT | Mod: HCNC

## 2019-08-03 PROCEDURE — 27000221 HC OXYGEN, UP TO 24 HOURS: Mod: HCNC

## 2019-08-03 PROCEDURE — 83880 ASSAY OF NATRIURETIC PEPTIDE: CPT | Mod: HCNC

## 2019-08-03 PROCEDURE — 25000003 PHARM REV CODE 250: Mod: HCNC | Performed by: EMERGENCY MEDICINE

## 2019-08-03 PROCEDURE — 93010 ELECTROCARDIOGRAM REPORT: CPT | Mod: HCNC,,, | Performed by: INTERNAL MEDICINE

## 2019-08-03 PROCEDURE — 36415 COLL VENOUS BLD VENIPUNCTURE: CPT | Mod: HCNC

## 2019-08-03 PROCEDURE — 85025 COMPLETE CBC W/AUTO DIFF WBC: CPT | Mod: HCNC

## 2019-08-03 PROCEDURE — 99285 EMERGENCY DEPT VISIT HI MDM: CPT | Mod: 25,HCNC

## 2019-08-03 PROCEDURE — 84145 PROCALCITONIN (PCT): CPT | Mod: 91,HCNC

## 2019-08-03 PROCEDURE — 96376 TX/PRO/DX INJ SAME DRUG ADON: CPT

## 2019-08-03 PROCEDURE — 93307 TTE W/O DOPPLER COMPLETE: CPT | Mod: 26,HCNC,, | Performed by: INTERNAL MEDICINE

## 2019-08-03 PROCEDURE — 93010 EKG 12-LEAD: ICD-10-PCS | Mod: HCNC,,, | Performed by: INTERNAL MEDICINE

## 2019-08-03 PROCEDURE — 82803 BLOOD GASES ANY COMBINATION: CPT | Mod: HCNC

## 2019-08-03 PROCEDURE — 36600 WITHDRAWAL OF ARTERIAL BLOOD: CPT | Mod: HCNC

## 2019-08-03 PROCEDURE — 27100171 HC OXYGEN HIGH FLOW UP TO 24 HOURS: Mod: HCNC

## 2019-08-03 PROCEDURE — 96372 THER/PROPH/DIAG INJ SC/IM: CPT | Mod: 59

## 2019-08-03 PROCEDURE — 93307 TTE W/O DOPPLER COMPLETE: CPT | Mod: HCNC

## 2019-08-03 PROCEDURE — 83605 ASSAY OF LACTIC ACID: CPT | Mod: HCNC

## 2019-08-03 PROCEDURE — 84145 PROCALCITONIN (PCT): CPT | Mod: HCNC

## 2019-08-03 PROCEDURE — 80053 COMPREHEN METABOLIC PANEL: CPT | Mod: HCNC

## 2019-08-03 PROCEDURE — 83735 ASSAY OF MAGNESIUM: CPT | Mod: HCNC

## 2019-08-03 PROCEDURE — 96375 TX/PRO/DX INJ NEW DRUG ADDON: CPT

## 2019-08-03 PROCEDURE — G0378 HOSPITAL OBSERVATION PER HR: HCPCS | Mod: HCNC

## 2019-08-03 PROCEDURE — 93005 ELECTROCARDIOGRAM TRACING: CPT | Mod: HCNC

## 2019-08-03 RX ORDER — PANTOPRAZOLE SODIUM 40 MG/1
40 TABLET, DELAYED RELEASE ORAL DAILY
Status: DISCONTINUED | OUTPATIENT
Start: 2019-08-03 | End: 2019-08-06 | Stop reason: HOSPADM

## 2019-08-03 RX ORDER — ASPIRIN 81 MG/1
81 TABLET ORAL DAILY
Status: DISCONTINUED | OUTPATIENT
Start: 2019-08-03 | End: 2019-08-03

## 2019-08-03 RX ORDER — HYDRALAZINE HYDROCHLORIDE 50 MG/1
100 TABLET, FILM COATED ORAL 2 TIMES DAILY
Status: DISCONTINUED | OUTPATIENT
Start: 2019-08-03 | End: 2019-08-06 | Stop reason: HOSPADM

## 2019-08-03 RX ORDER — FUROSEMIDE 10 MG/ML
40 INJECTION INTRAMUSCULAR; INTRAVENOUS
Status: COMPLETED | OUTPATIENT
Start: 2019-08-03 | End: 2019-08-03

## 2019-08-03 RX ORDER — NAPROXEN SODIUM 220 MG/1
162 TABLET, FILM COATED ORAL
Status: COMPLETED | OUTPATIENT
Start: 2019-08-03 | End: 2019-08-03

## 2019-08-03 RX ORDER — CLOPIDOGREL BISULFATE 75 MG/1
75 TABLET ORAL DAILY
Status: DISCONTINUED | OUTPATIENT
Start: 2019-08-03 | End: 2019-08-06 | Stop reason: HOSPADM

## 2019-08-03 RX ORDER — HEPARIN SODIUM 5000 [USP'U]/ML
5000 INJECTION, SOLUTION INTRAVENOUS; SUBCUTANEOUS EVERY 8 HOURS
Status: DISCONTINUED | OUTPATIENT
Start: 2019-08-03 | End: 2019-08-03

## 2019-08-03 RX ORDER — HEPARIN SODIUM,PORCINE/D5W 25000/250
12 INTRAVENOUS SOLUTION INTRAVENOUS CONTINUOUS
Status: DISCONTINUED | OUTPATIENT
Start: 2019-08-03 | End: 2019-08-06

## 2019-08-03 RX ORDER — ASPIRIN 81 MG/1
81 TABLET ORAL DAILY
Status: DISCONTINUED | OUTPATIENT
Start: 2019-08-04 | End: 2019-08-06 | Stop reason: HOSPADM

## 2019-08-03 RX ORDER — PRAZOSIN HYDROCHLORIDE 1 MG/1
1 CAPSULE ORAL 2 TIMES DAILY
Status: DISCONTINUED | OUTPATIENT
Start: 2019-08-03 | End: 2019-08-06 | Stop reason: HOSPADM

## 2019-08-03 RX ORDER — GLUCAGON 1 MG
1 KIT INJECTION
Status: DISCONTINUED | OUTPATIENT
Start: 2019-08-03 | End: 2019-08-06 | Stop reason: HOSPADM

## 2019-08-03 RX ORDER — IBUPROFEN 200 MG
16 TABLET ORAL
Status: DISCONTINUED | OUTPATIENT
Start: 2019-08-03 | End: 2019-08-06 | Stop reason: HOSPADM

## 2019-08-03 RX ORDER — ISOSORBIDE DINITRATE 20 MG/1
20 TABLET ORAL 3 TIMES DAILY
Status: DISCONTINUED | OUTPATIENT
Start: 2019-08-03 | End: 2019-08-03

## 2019-08-03 RX ORDER — ISOSORBIDE DINITRATE 10 MG/1
20 TABLET ORAL 3 TIMES DAILY
Status: DISCONTINUED | OUTPATIENT
Start: 2019-08-03 | End: 2019-08-06 | Stop reason: HOSPADM

## 2019-08-03 RX ORDER — METOPROLOL SUCCINATE 50 MG/1
50 TABLET, EXTENDED RELEASE ORAL DAILY
Status: DISCONTINUED | OUTPATIENT
Start: 2019-08-03 | End: 2019-08-06 | Stop reason: HOSPADM

## 2019-08-03 RX ORDER — SERTRALINE HYDROCHLORIDE 50 MG/1
50 TABLET, FILM COATED ORAL DAILY
Status: DISCONTINUED | OUTPATIENT
Start: 2019-08-03 | End: 2019-08-06 | Stop reason: HOSPADM

## 2019-08-03 RX ORDER — FUROSEMIDE 10 MG/ML
40 INJECTION INTRAMUSCULAR; INTRAVENOUS 2 TIMES DAILY
Status: DISCONTINUED | OUTPATIENT
Start: 2019-08-03 | End: 2019-08-04

## 2019-08-03 RX ORDER — INSULIN ASPART 100 [IU]/ML
1-10 INJECTION, SOLUTION INTRAVENOUS; SUBCUTANEOUS
Status: DISCONTINUED | OUTPATIENT
Start: 2019-08-03 | End: 2019-08-06 | Stop reason: HOSPADM

## 2019-08-03 RX ORDER — ASPIRIN 325 MG
325 TABLET ORAL DAILY
Status: DISCONTINUED | OUTPATIENT
Start: 2019-08-04 | End: 2019-08-03

## 2019-08-03 RX ORDER — IBUPROFEN 200 MG
24 TABLET ORAL
Status: DISCONTINUED | OUTPATIENT
Start: 2019-08-03 | End: 2019-08-06 | Stop reason: HOSPADM

## 2019-08-03 RX ORDER — ROSUVASTATIN CALCIUM 10 MG/1
40 TABLET, COATED ORAL NIGHTLY
Status: DISCONTINUED | OUTPATIENT
Start: 2019-08-03 | End: 2019-08-06 | Stop reason: HOSPADM

## 2019-08-03 RX ORDER — AMLODIPINE BESYLATE 10 MG/1
10 TABLET ORAL DAILY
Status: DISCONTINUED | OUTPATIENT
Start: 2019-08-03 | End: 2019-08-06 | Stop reason: HOSPADM

## 2019-08-03 RX ADMIN — ROSUVASTATIN CALCIUM 40 MG: 10 TABLET, COATED ORAL at 10:08

## 2019-08-03 RX ADMIN — CLOPIDOGREL BISULFATE 75 MG: 75 TABLET ORAL at 06:08

## 2019-08-03 RX ADMIN — PRAZOSIN HYDROCHLORIDE 1 MG: 1 CAPSULE ORAL at 10:08

## 2019-08-03 RX ADMIN — AZITHROMYCIN MONOHYDRATE 500 MG: 500 INJECTION, POWDER, LYOPHILIZED, FOR SOLUTION INTRAVENOUS at 06:08

## 2019-08-03 RX ADMIN — AMLODIPINE BESYLATE 10 MG: 10 TABLET ORAL at 06:08

## 2019-08-03 RX ADMIN — FUROSEMIDE 40 MG: 10 INJECTION, SOLUTION INTRAMUSCULAR; INTRAVENOUS at 01:08

## 2019-08-03 RX ADMIN — SERTRALINE HYDROCHLORIDE 50 MG: 50 TABLET ORAL at 06:08

## 2019-08-03 RX ADMIN — METOPROLOL SUCCINATE 50 MG: 50 TABLET, EXTENDED RELEASE ORAL at 06:08

## 2019-08-03 RX ADMIN — CEFTRIAXONE 1 G: 1 INJECTION, SOLUTION INTRAVENOUS at 06:08

## 2019-08-03 RX ADMIN — FUROSEMIDE 40 MG: 10 INJECTION, SOLUTION INTRAMUSCULAR; INTRAVENOUS at 06:08

## 2019-08-03 RX ADMIN — HEPARIN SODIUM 12 UNITS/KG/HR: 10000 INJECTION, SOLUTION INTRAVENOUS at 06:08

## 2019-08-03 RX ADMIN — HYDRALAZINE HYDROCHLORIDE 100 MG: 50 TABLET ORAL at 10:08

## 2019-08-03 RX ADMIN — ISOSORBIDE DINITRATE 20 MG: 10 TABLET ORAL at 10:08

## 2019-08-03 RX ADMIN — ASPIRIN 81 MG CHEWABLE TABLET 162 MG: 81 TABLET CHEWABLE at 11:08

## 2019-08-03 RX ADMIN — PANTOPRAZOLE SODIUM 40 MG: 40 TABLET, DELAYED RELEASE ORAL at 06:08

## 2019-08-03 RX ADMIN — INSULIN ASPART 1 UNITS: 100 INJECTION, SOLUTION INTRAVENOUS; SUBCUTANEOUS at 10:08

## 2019-08-03 NOTE — ED PROVIDER NOTES
SCRIBE #1 NOTE: I, Kaykay Jonse, am scribing for, and in the presence of, Micha Perrin Jr., MD. I have scribed the entire note.       History     Chief Complaint   Patient presents with    Fatigue     c/o weakness, and fever that started this morning      Review of patient's allergies indicates:   Allergen Reactions    Influenza virus vaccines Other (See Comments)     Red streaks up arm    Isosorbide (solution)      Headache    Penicillins Swelling         History of Present Illness     HPI    8/3/2019, 11:31 AM  History obtained from the patient and wife       History of Present Illness: Carlitos Ellington is a 78 y.o. male patient with a PMHx of CABG, CVA, CAD, CHF, DM, GERD, HTN, HLD, and mild AI who presents to the Emergency Department for evaluation of generalized weakness which onset suddenly when he woke up this morning. Pt denies SOB at this time, but wife states he seems SOB. Symptoms are constant and moderate in severity. No exacerbating factors reported. Associated sxs include fatigue and fever. Pt is on 2L O2 at all times. Wife states pt stats 95% at baseline. Patient denies any CP, abd pain, hematuria, dysuria, blood in stool, difficulty urinating, frequency, n/v/d, leg swelling, palpitations, and all other sxs at this time. EMS states pt's O2 stats dropped en route, so they put him on 4L O2 with relief. Wife gave pt 2 tylenol x1 hour PTA. No further complaints or concerns at this time.     Arrival mode: EMS    PCP: Raza Uribe MD        Past Medical History:  Past Medical History:   Diagnosis Date    Aortic stenosis, mild     Carotid stenosis     50% [facility]    Chronic kidney disease, stage IV (severe)     Congestive heart failure, NYHA class 3, chronic, systolic     Coronary artery disease     Diabetes mellitus with no complication     GERD (gastroesophageal reflux disease)     History of CVA (cerebrovascular accident) 11/2018    MRI  reveals right  parietal lobe infarct    Hx of CABG 5/28/2019    Hyperlipidemia associated with type 2 diabetes mellitus     Hypertension associated with diabetes     Mild AI (aortic insufficiency)     Moderate mitral regurgitation     Type II diabetes mellitus with renal manifestations        Past Surgical History:  Past Surgical History:   Procedure Laterality Date    CORONARY ARTERY BYPASS GRAFT      CORONARY STENT PLACEMENT      KIDNEY STONE SURGERY           Family History:  Family history reviewed. No pertinent family history.     Social History:  Social History     Tobacco Use    Smoking status: Former Smoker    Smokeless tobacco: Current User     Types: Snuff   Substance and Sexual Activity    Alcohol use: No    Drug use: No    Sexual activity: Unknown        Review of Systems     Review of Systems   Constitutional: Positive for fatigue and fever.   HENT: Negative for sore throat.    Respiratory: Negative for shortness of breath.    Cardiovascular: Negative for chest pain, palpitations and leg swelling.   Gastrointestinal: Negative for abdominal pain, blood in stool, diarrhea, nausea and vomiting.   Genitourinary: Negative for difficulty urinating, dysuria, frequency and hematuria.   Musculoskeletal: Negative for back pain.   Skin: Negative for rash.   Neurological: Positive for weakness (generalized).   Hematological: Does not bruise/bleed easily.   All other systems reviewed and are negative.     Physical Exam     Initial Vitals   BP Pulse Resp Temp SpO2   08/03/19 1129 08/02/19 2100 08/03/19 1129 08/03/19 1129 08/03/19 1129   120/62 89 (!) 22 99.2 °F (37.3 °C) (!) 94 %      MAP       --                 Physical Exam  Nursing Notes and Vital Signs Reviewed.  Constitutional: Patient is in no acute distress. Well-developed and well-nourished.  Head: Atraumatic. Normocephalic.  Eyes: PERRL. EOM intact. Conjunctivae are not pale. No scleral icterus.  ENT: Mucous membranes are moist. Oropharynx is clear and  symmetric.    Neck: Supple. Full ROM. No lymphadenopathy.  Cardiovascular: Regular rate. Regular rhythm. No murmurs, rubs, or gallops. Distal pulses are 2+ and symmetric.  Pulmonary/Chest: Decreased breath sounds bilat. No wheezing or rales.  Abdominal: Soft and non-distended.  There is no tenderness.  No rebound, guarding, or rigidity. Good bowel sounds.  Genitourinary: No CVA tenderness  Musculoskeletal: Moves all extremities. No obvious deformities. No edema. No calf tenderness.  Skin: Warm and dry.  Neurological: Patient is alert and oriented to person, place and time. GCS 15. Answers questions appropriately. Pupils ERRL and EOM normal. Cranial nerves II-XII are intact. Strength is full bilaterally; it is equal and 5/5 in bilateral upper and lower extremities. Light touch sense is intact. Speech is clear and normal. No acute focal neurological deficits noted.     ED Course   Procedures  ED Vital Signs:  Vitals:    08/04/19 2040 08/04/19 2100 08/04/19 2300 08/05/19 0008   BP:       Pulse: 90 92 98 100   Resp: 19   20   Temp:       TempSrc:       SpO2: 96%   (!) 88%   Weight:       Height:        08/05/19 0013 08/05/19 0100 08/05/19 0114 08/05/19 0318   BP:   126/60 134/62   Pulse:  100 86 91   Resp:   18 18   Temp:   97.9 °F (36.6 °C) 96.7 °F (35.9 °C)   TempSrc:   Oral Oral   SpO2: (S) 96%  96% 97%   Weight:   97 kg (213 lb 13.5 oz)    Height:        08/05/19 0339 08/05/19 0537 08/05/19 0732 08/05/19 0818   BP:       Pulse: 92 98 93 95   Resp:    18   Temp:       TempSrc:       SpO2:    98%   Weight:       Height:        08/05/19 0847 08/05/19 0946 08/05/19 1122   BP: (!) 151/67     Pulse: 87 93 97   Resp: 16     Temp: 98 °F (36.7 °C)     TempSrc: Oral     SpO2: 97%     Weight:      Height:          Abnormal Lab Results:  Labs Reviewed   CBC W/ AUTO DIFFERENTIAL - Abnormal; Notable for the following components:       Result Value    RBC 3.34 (*)     Hemoglobin 9.8 (*)     Hematocrit 29.9 (*)     RDW 14.9 (*)      Lymph # 0.5 (*)     Gran% 85.2 (*)     Lymph% 5.3 (*)     All other components within normal limits   COMPREHENSIVE METABOLIC PANEL - Abnormal; Notable for the following components:    Glucose 161 (*)     BUN, Bld 35 (*)     Creatinine 2.8 (*)     Calcium 8.0 (*)     Total Protein 5.6 (*)     Albumin 2.7 (*)     Alkaline Phosphatase 49 (*)     Anion Gap 7 (*)     eGFR if  24 (*)     eGFR if non  21 (*)     All other components within normal limits   TROPONIN I - Abnormal; Notable for the following components:    Troponin I 0.242 (*)     All other components within normal limits   B-TYPE NATRIURETIC PEPTIDE - Abnormal; Notable for the following components:    BNP 1,711 (*)     All other components within normal limits   URINALYSIS, REFLEX TO URINE CULTURE - Abnormal; Notable for the following components:    Protein, UA 2+ (*)     Glucose, UA Trace (*)     All other components within normal limits    Narrative:     Preferred Collection Type->Urine, Clean Catch   ISTAT PROCEDURE - Abnormal; Notable for the following components:    POC PO2 55 (*)     POC HCO3 22.7 (*)     POC SATURATED O2 89 (*)     All other components within normal limits   LACTIC ACID, PLASMA   PROCALCITONIN   URINALYSIS MICROSCOPIC    Narrative:     Preferred Collection Type->Urine, Clean Catch   POCT GLUCOSE MONITORING CONTINUOUS        All Lab Results:  Results for orders placed or performed during the hospital encounter of 08/03/19   Blood culture   Result Value Ref Range    Blood Culture, Routine No Growth to date     Blood Culture, Routine No Growth to date    Blood culture   Result Value Ref Range    Blood Culture, Routine No Growth to date     Blood Culture, Routine No Growth to date    CBC auto differential   Result Value Ref Range    WBC 8.82 3.90 - 12.70 K/uL    RBC 3.34 (L) 4.60 - 6.20 M/uL    Hemoglobin 9.8 (L) 14.0 - 18.0 g/dL    Hematocrit 29.9 (L) 40.0 - 54.0 %    Mean Corpuscular Volume 90 82 - 98 fL     Mean Corpuscular Hemoglobin 29.3 27.0 - 31.0 pg    Mean Corpuscular Hemoglobin Conc 32.8 32.0 - 36.0 g/dL    RDW 14.9 (H) 11.5 - 14.5 %    Platelets 212 150 - 350 K/uL    MPV 9.9 9.2 - 12.9 fL    Gran # (ANC) 7.5 1.8 - 7.7 K/uL    Lymph # 0.5 (L) 1.0 - 4.8 K/uL    Mono # 0.8 0.3 - 1.0 K/uL    Eos # 0.0 0.0 - 0.5 K/uL    Baso # 0.01 0.00 - 0.20 K/uL    Gran% 85.2 (H) 38.0 - 73.0 %    Lymph% 5.3 (L) 18.0 - 48.0 %    Mono% 9.5 4.0 - 15.0 %    Eosinophil% 0.1 0.0 - 8.0 %    Basophil% 0.1 0.0 - 1.9 %    Differential Method Automated    Comprehensive metabolic panel   Result Value Ref Range    Sodium 139 136 - 145 mmol/L    Potassium 3.8 3.5 - 5.1 mmol/L    Chloride 109 95 - 110 mmol/L    CO2 23 23 - 29 mmol/L    Glucose 161 (H) 70 - 110 mg/dL    BUN, Bld 35 (H) 8 - 23 mg/dL    Creatinine 2.8 (H) 0.5 - 1.4 mg/dL    Calcium 8.0 (L) 8.7 - 10.5 mg/dL    Total Protein 5.6 (L) 6.0 - 8.4 g/dL    Albumin 2.7 (L) 3.5 - 5.2 g/dL    Total Bilirubin 0.4 0.1 - 1.0 mg/dL    Alkaline Phosphatase 49 (L) 55 - 135 U/L    AST 14 10 - 40 U/L    ALT 18 10 - 44 U/L    Anion Gap 7 (L) 8 - 16 mmol/L    eGFR if African American 24 (A) >60 mL/min/1.73 m^2    eGFR if non African American 21 (A) >60 mL/min/1.73 m^2   Troponin I #1   Result Value Ref Range    Troponin I 0.242 (H) 0.000 - 0.026 ng/mL   Troponin I #2   Result Value Ref Range    Troponin I 0.358 (H) 0.000 - 0.026 ng/mL   B-Type natriuretic peptide (BNP)   Result Value Ref Range    BNP 1,711 (H) 0 - 99 pg/mL   Urinalysis, Reflex to Urine Culture Urine, Clean Catch   Result Value Ref Range    Specimen UA Urine, Catheterized     Color, UA Yellow Yellow, Straw, Kylie    Appearance, UA Clear Clear    pH, UA 6.0 5.0 - 8.0    Specific Gravity, UA 1.025 1.005 - 1.030    Protein, UA 2+ (A) Negative    Glucose, UA Trace (A) Negative    Ketones, UA Negative Negative    Bilirubin (UA) Negative Negative    Occult Blood UA Negative Negative    Nitrite, UA Negative Negative    Urobilinogen, UA  Negative <2.0 EU/dL    Leukocytes, UA Negative Negative   Lactic acid, plasma   Result Value Ref Range    Lactate (Lactic Acid) 0.6 0.5 - 2.2 mmol/L   Procalcitonin   Result Value Ref Range    Procalcitonin 0.16 <0.25 ng/mL   Urinalysis Microscopic   Result Value Ref Range    RBC, UA 1 0 - 4 /hpf    WBC, UA 1 0 - 5 /hpf    Bacteria Occasional None-Occ /hpf    Hyaline Casts, UA 0 0-1/lpf /lpf    Amorphous, UA Moderate None-Moderate    Microscopic Comment SEE COMMENT    Troponin I   Result Value Ref Range    Troponin I 0.350 (H) 0.000 - 0.026 ng/mL   Magnesium - if not done in ED   Result Value Ref Range    Magnesium 1.8 1.6 - 2.6 mg/dL   Phosphorus - if not done in ED   Result Value Ref Range    Phosphorus 3.2 2.7 - 4.5 mg/dL   Procalcitonin   Result Value Ref Range    Procalcitonin 0.18 <0.25 ng/mL   APTT   Result Value Ref Range    aPTT 23.2 21.0 - 32.0 sec   Protime-INR   Result Value Ref Range    Prothrombin Time 10.0 9.0 - 12.5 sec    INR 0.9 0.8 - 1.2   Troponin I   Result Value Ref Range    Troponin I 0.321 (H) 0.000 - 0.026 ng/mL   APTT   Result Value Ref Range    aPTT 37.4 (H) 21.0 - 32.0 sec   CBC auto differential   Result Value Ref Range    WBC 8.79 3.90 - 12.70 K/uL    RBC 3.27 (L) 4.60 - 6.20 M/uL    Hemoglobin 9.5 (L) 14.0 - 18.0 g/dL    Hematocrit 29.3 (L) 40.0 - 54.0 %    Mean Corpuscular Volume 90 82 - 98 fL    Mean Corpuscular Hemoglobin 29.1 27.0 - 31.0 pg    Mean Corpuscular Hemoglobin Conc 32.4 32.0 - 36.0 g/dL    RDW 15.1 (H) 11.5 - 14.5 %    Platelets 222 150 - 350 K/uL    MPV 10.3 9.2 - 12.9 fL    Gran # (ANC) 7.3 1.8 - 7.7 K/uL    Lymph # 0.5 (L) 1.0 - 4.8 K/uL    Mono # 1.0 0.3 - 1.0 K/uL    Eos # 0.0 0.0 - 0.5 K/uL    Baso # 0.01 0.00 - 0.20 K/uL    Gran% 82.7 (H) 38.0 - 73.0 %    Lymph% 5.7 (L) 18.0 - 48.0 %    Mono% 11.4 4.0 - 15.0 %    Eosinophil% 0.3 0.0 - 8.0 %    Basophil% 0.1 0.0 - 1.9 %    Differential Method Automated    Basic metabolic panel    Result Value Ref Range    Sodium  138 136 - 145 mmol/L    Potassium 3.9 3.5 - 5.1 mmol/L    Chloride 104 95 - 110 mmol/L    CO2 26 23 - 29 mmol/L    Glucose 155 (H) 70 - 110 mg/dL    BUN, Bld 41 (H) 8 - 23 mg/dL    Creatinine 3.2 (H) 0.5 - 1.4 mg/dL    Calcium 8.7 8.7 - 10.5 mg/dL    Anion Gap 8 8 - 16 mmol/L    eGFR if African American 20 (A) >60 mL/min/1.73 m^2    eGFR if non African American 18 (A) >60 mL/min/1.73 m^2   APTT   Result Value Ref Range    aPTT 54.4 (H) 21.0 - 32.0 sec   APTT   Result Value Ref Range    aPTT 39.5 (H) 21.0 - 32.0 sec   APTT   Result Value Ref Range    aPTT 37.9 (H) 21.0 - 32.0 sec   APTT   Result Value Ref Range    aPTT 66.3 (H) 21.0 - 32.0 sec   CBC auto differential   Result Value Ref Range    WBC 6.28 3.90 - 12.70 K/uL    RBC 3.17 (L) 4.60 - 6.20 M/uL    Hemoglobin 9.4 (L) 14.0 - 18.0 g/dL    Hematocrit 28.3 (L) 40.0 - 54.0 %    Mean Corpuscular Volume 89 82 - 98 fL    Mean Corpuscular Hemoglobin 29.7 27.0 - 31.0 pg    Mean Corpuscular Hemoglobin Conc 33.2 32.0 - 36.0 g/dL    RDW 14.7 (H) 11.5 - 14.5 %    Platelets 202 150 - 350 K/uL    MPV 9.9 9.2 - 12.9 fL    Gran # (ANC) 6.0 1.8 - 7.7 K/uL    Lymph # 0.2 (L) 1.0 - 4.8 K/uL    Mono # 0.2 (L) 0.3 - 1.0 K/uL    Eos # 0.0 0.0 - 0.5 K/uL    Baso # 0.00 0.00 - 0.20 K/uL    Gran% 95.2 (H) 38.0 - 73.0 %    Lymph% 2.9 (L) 18.0 - 48.0 %    Mono% 2.4 (L) 4.0 - 15.0 %    Eosinophil% 0.0 0.0 - 8.0 %    Basophil% 0.0 0.0 - 1.9 %    Differential Method Automated    Basic metabolic panel    Result Value Ref Range    Sodium 137 136 - 145 mmol/L    Potassium 3.7 3.5 - 5.1 mmol/L    Chloride 100 95 - 110 mmol/L    CO2 25 23 - 29 mmol/L    Glucose 324 (H) 70 - 110 mg/dL    BUN, Bld 43 (H) 8 - 23 mg/dL    Creatinine 3.4 (H) 0.5 - 1.4 mg/dL    Calcium 9.0 8.7 - 10.5 mg/dL    Anion Gap 12 8 - 16 mmol/L    eGFR if African American 19 (A) >60 mL/min/1.73 m^2    eGFR if non African American 16 (A) >60 mL/min/1.73 m^2   APTT   Result Value Ref Range    aPTT 66.2 (H) 21.0 - 32.0 sec   2D  echo only   Result Value Ref Range    QEF 25 (A) 55 - 65    Diastolic Dysfunction Yes (A)     Aortic Valve Stenosis MILD (A)     Mitral Valve Mobility NORMAL    ISTAT PROCEDURE   Result Value Ref Range    POC PH 7.408 7.35 - 7.45    POC PCO2 36.0 35 - 45 mmHg    POC PO2 55 (LL) 80 - 100 mmHg    POC HCO3 22.7 (L) 24 - 28 mmol/L    POC BE -2 -2 to 2 mmol/L    POC SATURATED O2 89 (L) 95 - 100 %    Sample ARTERIAL     Site RR     Allens Test Pass     DelSys Nasal Can     Mode SPONT     Flow 2     FiO2 28    POCT glucose   Result Value Ref Range    POCT Glucose 106 70 - 110 mg/dL   POCT glucose   Result Value Ref Range    POCT Glucose 177 (H) 70 - 110 mg/dL   POCT glucose   Result Value Ref Range    POCT Glucose 138 (H) 70 - 110 mg/dL   POCT glucose   Result Value Ref Range    POCT Glucose 198 (H) 70 - 110 mg/dL   POCT glucose   Result Value Ref Range    POCT Glucose 281 (H) 70 - 110 mg/dL   POCT glucose   Result Value Ref Range    POCT Glucose 410 (H) 70 - 110 mg/dL   POCT glucose   Result Value Ref Range    POCT Glucose 410 (H) 70 - 110 mg/dL   POCT glucose   Result Value Ref Range    POCT Glucose 364 (H) 70 - 110 mg/dL   POCT glucose   Result Value Ref Range    POCT Glucose 325 (H) 70 - 110 mg/dL   POCT glucose   Result Value Ref Range    POCT Glucose 390 (H) 70 - 110 mg/dL       Imaging Results:  Imaging Results          X-Ray Chest AP Portable (Final result)  Result time 08/03/19 11:57:16    Final result by Morales Walsh MD (08/03/19 11:57:16)                 Impression:      Cardiomegaly with pulmonary edema.  Bilateral pleural fluid collections left greater than right with atelectasis or consolidation left lower lobe.      Electronically signed by: Morales Walsh  Date:    08/03/2019  Time:    11:57             Narrative:    EXAMINATION:  XR CHEST AP PORTABLE    CLINICAL HISTORY:  fatigue;    COMPARISON:  05/28/2019    FINDINGS:  Cardiomegaly with by lateral pulmonary congestion.  There is some consolidation  atelectatic change in the left lung base with bilateral pleural fluid collections.                                 The EKG was ordered, reviewed, and independently interpreted by the ED provider.  Interpretation time: 1152  Rate: 75 BPM  Rhythm: Sinus rhythm with occasional premature ventricular complexes and premature atrial complexes.  Interpretation: Possible L atrial enlargement. Nonspecific intraventricular block. Inferior infarct, age undetermined. T wave abnormality, consider lateral ischemia. No STEMI.         The Emergency Provider reviewed the vital signs and test results, which are outlined above.     ED Discussion     1:52 PM: Re-evaluated pt. Pt states he is getting SOB at this time. I have discussed test results, shared treatment plan, and the need for admission with patient and family at bedside. Pt and family express understanding at this time and agree with all information. All questions answered. Pt and family have no further questions or concerns at this time. Pt is ready for admit.    1:52 PM: Discussed case with Miriam Hankins NP (Hospital Medicine). Dr. Goldman agrees with current care and management of pt and accepts admission.   Admitting Service: Hospital Medicine   Admitting Physician: Dr. Goldman   Admit to: Obs      ED Medication(s):  Medications   amLODIPine tablet 10 mg (10 mg Oral Given 8/5/19 0847)   clopidogrel tablet 75 mg (75 mg Oral Given 8/5/19 0847)   hydrALAZINE tablet 100 mg (100 mg Oral Given 8/5/19 0847)   metoprolol succinate (TOPROL-XL) 24 hr tablet 50 mg (50 mg Oral Given 8/5/19 0847)   pantoprazole EC tablet 40 mg (40 mg Oral Given 8/5/19 0847)   prazosin capsule 1 mg (1 mg Oral Given 8/5/19 0847)   sertraline tablet 50 mg (50 mg Oral Given 8/5/19 0847)   glucose chewable tablet 16 g (has no administration in time range)   glucose chewable tablet 24 g (has no administration in time range)   dextrose 50% injection 12.5 g (has no administration in time range)   dextrose 50%  injection 25 g (has no administration in time range)   glucagon (human recombinant) injection 1 mg (has no administration in time range)   insulin aspart U-100 pen 1-10 Units (10 Units Subcutaneous Given 8/5/19 1127)   cefTRIAXone (ROCEPHIN) 1 g in dextrose 5 % 50 mL IVPB (1 g Intravenous New Bag 8/4/19 1607)   azithromycin 500 mg in dextrose 5 % 250 mL IVPB (ready to mix system) (500 mg Intravenous New Bag 8/4/19 1555)   heparin 25,000 units in dextrose 5% 250 mL (100 units/mL) infusion LOW INTENSITY nomogram - [facility] (16 Units/kg/hr × 81.1 kg (Adjusted) Intravenous New Bag 8/5/19 0355)   aspirin EC tablet 81 mg (81 mg Oral Given 8/5/19 0847)   rosuvastatin tablet 40 mg (40 mg Oral Given 8/4/19 2221)   isosorbide dinitrate tablet 20 mg (20 mg Oral Given 8/5/19 0847)   heparin 25,000 units in dextrose 5% (100 units/ml) IV bolus from bag - ADDITIONAL PRN BOLUS - 60 units/kg (max bolus 4000 units) (has no administration in time range)   heparin 25,000 units in dextrose 5% (100 units/ml) IV bolus from bag - ADDITIONAL PRN BOLUS - 30 units/kg (max bolus 4000 units) (2,430 Units Intravenous Bolus from Bag 8/4/19 1733)   DOBUtamine 1000 mg in D5W 250 mL infusion (premix non-titrating) (2.5 mcg/kg/min × 94.7 kg Intravenous Handoff 8/4/19 1906)   furosemide injection 80 mg (80 mg Intravenous Given 8/5/19 0846)   albuterol-ipratropium 2.5 mg-0.5 mg/3 mL nebulizer solution 3 mL (3 mLs Nebulization Given 8/5/19 0818)   budesonide nebulizer solution 0.5 mg (0.5 mg Nebulization Given 8/5/19 0818)   ramelteon tablet 8 mg (8 mg Oral Given 8/5/19 0024)   methylPREDNISolone sodium succinate (SOLU-MEDROL) 40 mg/mL injection (  Canceled Entry 8/5/19 0600)   insulin detemir U-100 pen 15 Units (has no administration in time range)   aspirin chewable tablet 162 mg (162 mg Oral Given 8/3/19 1150)   furosemide injection 40 mg (40 mg Intravenous Given 8/3/19 1301)   heparin 25,000 units in dextrose 5% (100 units/ml) IV bolus from bag INITIAL  BOLUS (max bolus 4000 units) (4,000 Units Intravenous Bolus from Bag 8/3/19 5467)   methylPREDNISolone sodium succinate injection 80 mg (80 mg Intravenous Given 8/5/19 5443)   insulin detemir U-100 pen 5 Units (5 Units Subcutaneous Given 8/4/19 8334)       Current Discharge Medication List                    Medical Decision Making:   Clinical Tests:   Lab Tests: Reviewed and Ordered  Radiological Study: Reviewed and Ordered  Medical Tests: Reviewed and Ordered             Scribe Attestation:   Scribe #1: I performed the above scribed service and the documentation accurately describes the services I performed. I attest to the accuracy of the note.     Attending:   Physician Attestation Statement for Scribe #1: I, Micha Perrin Jr., MD, personally performed the services described in this documentation, as scribed by Kaykay Jones, in my presence, and it is both accurate and complete.           Clinical Impression       ICD-10-CM ICD-9-CM   1. Hypoxemia R09.02 799.02   2. Fatigue R53.83 780.79   3. Acute on chronic congestive heart failure, unspecified heart failure type I50.9 428.0   4. Elevated troponin R74.8 790.6   5. Renal dysfunction N28.9 593.9   6. Systolic heart failure I50.20 428.20   7. CHF (congestive heart failure) I50.9 428.0       Disposition:   Disposition: Placed in Observation  Condition: Ailin Perrin Jr., MD  08/05/19 9994

## 2019-08-03 NOTE — PROGRESS NOTES
Patient wanted to eat , removed mask , placed him on high flow NC on 15 L only achieving a spo2 of 88 . Messaging the attending to notify

## 2019-08-03 NOTE — H&P
[facility] Hospital Medicine  History & Physical    Patient Name: Carlitos Ellington  MRN: [id number]  Admission Date: 8/3/2019  Attending Physician: Kylah Goldman MD   Primary Care Provider: Raza Uribe MD         Patient information was obtained from patient, spouse/SO and ER records.     Subjective:     Principal Problem:Hypoxemia    Chief Complaint:   Chief Complaint   Patient presents with    Fatigue     c/o weakness, and fever that started this morning         HPI: Carlitos Ellington is a 78 y.o. male patient with a PMHx of CABG, CVA, CAD, CHF, DM, GERD, HTN, HLD, and mild AI who presents to the Emergency Department for evaluation of generalized weakness which onset suddenly when he woke up this morning. Pt denies SOB at this time, but wife states he seems SOB. Symptoms are constant and moderate in severity. No exacerbating factors reported. Associated sxs include fatigue and fever. Pt is on continuous oxygen at 2L NC. Patient denies any CP, abd pain, hematuria, dysuria, blood in stool, difficulty urinating, frequency, n/v/d, leg swelling, palpitations, and all other sxs at this time.  Per EMS pt's O2 sats dropped and was placed on 4L NC. Wife gave pt 2 tylenol x1 hour PTA.  ED work-up resulted h/h of 9.8/29.9, BUN 35, Creatinine 2.8, BNP 1711, troponin 0.242, ABG: PO2 55, HCO3 22.7 on  2L NC. CXR notes pulmonary edema.  Bilateral pleural fluid collections left greater than right with atelectasis or consolidation left lower lobe. Hospital medicine called for admission. Patient is a DNR.     Past Medical History:   Diagnosis Date    Aortic stenosis, mild     [facility]ot[facility]     [facility]    Chronic kidney disease, stage IV (severe)     Congestive heart failure, NYHA class 3, chronic, systolic     Coronary artery disease     Diabetes mellitus with no complication     GERD (gastroesophageal reflux disease)     History of CVA (cerebrovascular accident)  11/2018    MRI  reveals right parietal lobe infarct    Hx of CABG 5/28/2019    Hyperlipidemia associated with type 2 diabetes mellitus     Hypertension associated with diabetes     Mild AI (aortic insufficiency)     Moderate mitral regurgitation     Type II diabetes mellitus with renal manifestations        Past Surgical History:   Procedure Laterality Date    CORONARY ARTERY BYPASS GRAFT      CORONARY STENT PLACEMENT      KIDNEY STONE SURGERY         Review of patient's allergies indicates:   Allergen Reactions    Influenza virus vaccines Other (See Comments)     Red streaks up arm    Isosorbide (solution)      Headache    Penicillins Swelling       No current facility-administered medications on file prior to encounter.      Current Outpatient Medications on File Prior to Encounter   Medication Sig    amlodipine (NORVASC) 10 MG tablet Take 10 mg by mouth once daily.    clopidogrel (PLAVIX) 75 mg tablet Take 1 tablet (75 mg total) by mouth once daily.    furosemide (LASIX) 40 MG tablet Take 1 tablet (40 mg total) by mouth once daily.    hydrALAZINE (APRESOLINE) 100 MG tablet Take 1 tablet (100 mg total) by mouth 2 (two) times daily.    insulin glargine (LANTUS) 100 unit/mL injection Inject 35 Units into the skin every evening.    isosorbide dinitrate (ISORDIL) 20 MG tablet Take 1 tablet (20 mg total) by mouth 3 (three) times daily.    metoprolol succinate (TOPROL-XL) 50 MG 24 hr tablet Take 50 mg by mouth once daily.    omeprazole (PRILOSEC) 20 MG capsule Take 20 mg by mouth once daily.    prazosin (MINIPRESS) 2 MG Cap Take 1 mg by mouth 2 (two) times daily.    rosuvastatin (CRESTOR) 40 MG Tab Take 1 tablet (40 mg total) by mouth every evening.    sertraline (ZOLOFT) 50 MG tablet Take 1 tablet (50 mg total) by mouth once daily.    ACCU-CHEK MASHA PLUS Strp     aspirin 325 MG tablet Take 81 mg by mouth once daily.     ondansetron (ZOFRAN-ODT) 4 MG TbDL Take 1 tablet (4 mg total) by mouth  every 8 (eight) hours as needed (prn nausea/vomiting).    prednisoLONE acetate (PRED FORTE) 1 % DrpS      Family History     None        Tobacco Use    Smoking status: Former Smoker    Smokeless tobacco: Current User     Types: Snuff   Substance and Sexual Activity    Alcohol use: No    Drug use: No    Sexual activity: Not on file     Review of Systems   Constitutional: Positive for activity change, fatigue and fever. Negative for appetite change.   HENT: Negative.    Eyes: Negative.    Respiratory: Positive for shortness of breath. Negative for cough and wheezing.    Cardiovascular: Negative for chest pain, palpitations and leg swelling.   Gastrointestinal: Negative.  Negative for abdominal pain, nausea and vomiting.   Endocrine: Negative.    Genitourinary: Negative.  Negative for dysuria, frequency and urgency.   Musculoskeletal: Negative.    Skin: Negative.    Allergic/Immunologic: Negative.    Neurological: Positive for weakness. Negative for dizziness, light-headedness and headaches.   Hematological: Negative.    Psychiatric/Behavioral: Negative.      Objective:     Vital Signs (Most Recent):  Temp: 97.7 °F (36.5 °C) (08/03/19 1409)  Pulse: 69 (08/03/19 1409)  Resp: 18 (08/03/19 1409)  BP: 127/60 (08/03/19 1409)  SpO2: 95 % (08/03/19 1409) Vital Signs (24h Range):  Temp:  [97.7 °F (36.5 °C)-99.2 °F (37.3 °C)] 97.7 °F (36.5 °C)  Pulse:  [69-80] 69  Resp:  [18-24] 18  SpO2:  [91 %-95 %] 95 %  BP: (120-139)/(60-92) 127/60     Weight: 96.7 kg (213 lb 1.6 oz)  Body mass index is 31.47 kg/m².    Physical Exam   Constitutional: He is oriented to person, place, and time. He appears well-developed and well-nourished.   Venti-mask 50%  @ 15L, o2 sats 93-94%   HENT:   Head: Normocephalic and atraumatic.   Eyes: Pupils are equal, round, and reactive to light. EOM are normal.   Neck: Normal range of motion. Neck supple.   Cardiovascular: Normal rate, regular rhythm, normal heart sounds, intact distal pulses and normal  pulses.   Pulmonary/Chest: Effort normal. No accessory muscle usage. No tachypnea. No respiratory distress. He has decreased breath sounds. He has rales in the right lower field and the left lower field.   Abdominal: Soft. Normal appearance and bowel sounds are normal. There is no tenderness.   Musculoskeletal: Normal range of motion. He exhibits edema.   Neurological: He is alert and oriented to person, place, and time. He has normal reflexes.   Skin: Skin is warm, dry and intact. Capillary refill takes less than 2 seconds.   Psychiatric: He has a normal mood and affect. His speech is normal and behavior is normal. Judgment and thought content normal. Cognition and memory are normal.   Nursing note and vitals reviewed.       Significant Labs:   ABGs:   Recent Labs   Lab 08/03/19  1146   PH 7.408   PCO2 36.0   HCO3 22.7*   POCSATURATED 89*   BE -2     Blood Culture: No results for input(s): LABBLOO in the last 48 hours.  BMP:   Recent Labs   Lab 08/03/19  1208   *      K 3.8      CO2 23   BUN 35*   CREATININE 2.8*   CALCIUM 8.0*     CBC:   Recent Labs   Lab 08/03/19  1208   WBC 8.82   HGB 9.8*   HCT 29.9*        CMP:   Recent Labs   Lab 08/03/19  1208      K 3.8      CO2 23   *   BUN 35*   CREATININE 2.8*   CALCIUM 8.0*   PROT 5.6*   ALBUMIN 2.7*   BILITOT 0.4   ALKPHOS 49*   AST 14   ALT 18   ANIONGAP 7*   EGFRNONAA 21*     Lactic Acid:   Recent Labs   Lab 08/03/19  1208   LACTATE 0.6     Respiratory Culture: No results for input(s): GSRESP, RESPIRATORYC in the last 48 hours.  Troponin:   Recent Labs   Lab 08/03/19  1208   TROPONINI 0.242*     All pertinent labs within the past 24 hours have been reviewed.    Significant Imaging:   Imaging Results          X-Ray Chest AP Portable (Final result)  Result time 08/03/19 11:57:16    Final result by Morales Walsh MD (08/03/19 11:57:16)                 Impression:      Cardiomegaly with pulmonary edema.  Bilateral pleural fluid  collections left greater than right with atelectasis or consolidation left lower lobe.      Electronically signed by: Morales Jillian  Date:    08/03/2019  Time:    11:57             Narrative:    EXAMINATION:  XR CHEST AP PORTABLE    CLINICAL HISTORY:  fatigue;    COMPARISON:  05/28/2019    FINDINGS:  Cardiomegaly with by lateral pulmonary congestion.  There is some consolidation atelectatic change in the left lung base with bilateral pleural fluid collections.                              Assessment/Plan:     * Hypoxemia  2/2 acute heart failure exacerbation.    IV diuresis        Continuous oxygen     Acute on chronic combined systolic and diastolic heart failure  IV diuresis and  Fluid/Sodium restriction.    Daily weights and strict I&O.    Cardiology following   Continue ASA, BB, Statin    2d echo    Troponin 0.242  Serial troponin's    No acute EKG changes      Elevated troponin  Elevated troponin likely due to demand ischemia/renal failure and CHF   Denies any chest pain   Initial troponin 0.242  Serial troponin's   No acute EKG changes   Cardiology following recommends heparin gtt and rule out PE    Heparin gtt initiated   VQ scan to r/o PE               Pneumonia left lower lobe  Subjective fever 99.2  WBCs normal, afebrile    Blood cx   IV azithromycin and rocephin empirically       CAD, multiple vessel  Continue ASA, plavix, Imdur, statin  No ACEi/ARB given baseline renal function  Cardiology following       Chronic kidney disease, stage IV (severe)  Creatinine 2.8 (Baseline 2.3-2.7)  IV diuresis   Careful fluid management.    Monitor urine output and renal function chemistries.    Nephrology consult pending course     Carotid stenosis  Continue Plavix       Hyperlipidemia associated with type 2 diabetes mellitus  Continue Statin       Hypertension associated with diabetes  Continue hydralazine, prazosin, and amlodipine  Monitor        VTE Risk Mitigation (From admission, onward)        Ordered     heparin  25,000 units in dextrose 5% (100 units/ml) IV bolus from bag INITIAL BOLUS (max bolus 4000 units)  Once      08/03/19 1455     heparin 25,000 units in dextrose 5% 250 mL (100 units/mL) infusion LOW INTENSITY nomogram - [facility]  Continuous      08/03/19 1455     IP VTE HIGH RISK PATIENT  Once      08/03/19 1403             Ivy Horn NP  Department of Hospital Medicine   [facility]

## 2019-08-03 NOTE — ASSESSMENT & PLAN NOTE
Creatinine 2.8 (Baseline 2.3-2.7)  IV diuresis   Careful fluid management.    Monitor urine output and renal function chemistries.    Nephrology consult pending course

## 2019-08-03 NOTE — ASSESSMENT & PLAN NOTE
IV diuresis and  Fluid/Sodium restriction.    Daily weights and strict I&O.    Cardiology following   Continue ASA, BB, Statin    2d echo    Troponin 0.242  Serial troponin's    No acute EKG changes

## 2019-08-03 NOTE — SUBJECTIVE & OBJECTIVE
Past Medical History:   Diagnosis Date    Aortic stenosis, mild     [facility]otid [facility]     [facility]    Chronic kidney disease, stage IV (severe)     Congestive heart failure, NYHA class 3, chronic, systolic     Coronary artery disease     Diabetes mellitus with no complication     GERD (gastroesophageal reflux disease)     History of CVA (cerebrovascular accident) 11/2018    MRI  reveals right parietal lobe infarct    Hx of CABG 5/28/2019    Hyperlipidemia associated with type 2 diabetes mellitus     Hypertension associated with diabetes     Mild AI (aortic insufficiency)     Moderate mitral regurgitation     Type II diabetes mellitus with renal manifestations        Past Surgical History:   Procedure Laterality Date    CORONARY ARTERY BYPASS GRAFT      CORONARY STENT PLACEMENT      KIDNEY STONE SURGERY         Review of patient's allergies indicates:   Allergen Reactions    Influenza virus vaccines Other (See Comments)     Red streaks up arm    Isosorbide (solution)      Headache    Penicillins Swelling       No current facility-administered medications on file prior to encounter.      Current Outpatient Medications on File Prior to Encounter   Medication Sig    amlodipine (NORVASC) 10 MG tablet Take 10 mg by mouth once daily.    clopidogrel (PLAVIX) 75 mg tablet Take 1 tablet (75 mg total) by mouth once daily.    furosemide (LASIX) 40 MG tablet Take 1 tablet (40 mg total) by mouth once daily.    hydrALAZINE (APRESOLINE) 100 MG tablet Take 1 tablet (100 mg total) by mouth 2 (two) times daily.    insulin glargine (LANTUS) 100 unit/mL injection Inject 35 Units into the skin every evening.    isosorbide dinitrate (ISORDIL) 20 MG tablet Take 1 tablet (20 mg total) by mouth 3 (three) times daily.    metoprolol succinate (TOPROL-XL) 50 MG 24 hr tablet Take 50 mg by mouth once daily.    omeprazole (PRILOSEC) 20 MG capsule Take 20 mg by mouth once daily.     prazosin (MINIPRESS) 2 MG Cap Take 1 mg by mouth 2 (two) times daily.    rosuvastatin (CRESTOR) 40 MG Tab Take 1 tablet (40 mg total) by mouth every evening.    sertraline (ZOLOFT) 50 MG tablet Take 1 tablet (50 mg total) by mouth once daily.    ACCU-CHEK MASHA PLUS Strp     aspirin 325 MG tablet Take 81 mg by mouth once daily.     ondansetron (ZOFRAN-ODT) 4 MG TbDL Take 1 tablet (4 mg total) by mouth every 8 (eight) hours as needed (prn nausea/vomiting).    prednisoLONE acetate (PRED FORTE) 1 % DrpS      Family History     None        Tobacco Use    Smoking status: Former Smoker    Smokeless tobacco: Current User     Types: Snuff   Substance and Sexual Activity    Alcohol use: No    Drug use: No    Sexual activity: Not on file     Review of Systems   Constitutional: Positive for activity change, fatigue and fever. Negative for appetite change.   HENT: Negative.    Eyes: Negative.    Respiratory: Positive for shortness of breath. Negative for cough and wheezing.    Cardiovascular: Negative for chest pain, palpitations and leg swelling.   Gastrointestinal: Negative.  Negative for abdominal pain, nausea and vomiting.   Endocrine: Negative.    Genitourinary: Negative.  Negative for dysuria, frequency and urgency.   Musculoskeletal: Negative.    Skin: Negative.    Allergic/Immunologic: Negative.    Neurological: Positive for weakness. Negative for dizziness, light-headedness and headaches.   Hematological: Negative.    Psychiatric/Behavioral: Negative.      Objective:     Vital Signs (Most Recent):  Temp: 97.7 °F (36.5 °C) (08/03/19 1409)  Pulse: 69 (08/03/19 1409)  Resp: 18 (08/03/19 1409)  BP: 127/60 (08/03/19 1409)  SpO2: 95 % (08/03/19 1409) Vital Signs (24h Range):  Temp:  [97.7 °F (36.5 °C)-99.2 °F (37.3 °C)] 97.7 °F (36.5 °C)  Pulse:  [69-80] 69  Resp:  [18-24] 18  SpO2:  [91 %-95 %] 95 %  BP: (120-139)/(60-92) 127/60     Weight: 96.7 kg (213 lb 1.6 oz)  Body mass index is 31.47 kg/m².    Physical Exam    Constitutional: He is oriented to person, place, and time. He appears well-developed and well-nourished.   Venti-mask 50%  @ 15L, o2 sats 93-94%   HENT:   Head: Normocephalic and atraumatic.   Eyes: Pupils are equal, round, and reactive to light. EOM are normal.   Neck: Normal range of motion. Neck supple.   Cardiovascular: Normal rate, regular rhythm, normal heart sounds, intact distal pulses and normal pulses.   Pulmonary/Chest: Effort normal. No accessory muscle usage. No tachypnea. No respiratory distress. He has decreased breath sounds. He has rales in the right lower field and the left lower field.   Abdominal: Soft. Normal appearance and bowel sounds are normal. There is no tenderness.   Musculoskeletal: Normal range of motion. He exhibits edema.   Neurological: He is alert and oriented to person, place, and time. He has normal reflexes.   Skin: Skin is warm, dry and intact. Capillary refill takes less than 2 seconds.   Psychiatric: He has a normal mood and affect. His speech is normal and behavior is normal. Judgment and thought content normal. Cognition and memory are normal.   Nursing note and vitals reviewed.       Significant Labs:   ABGs:   Recent Labs   Lab 08/03/19  1146   PH 7.408   PCO2 36.0   HCO3 22.7*   POCSATURATED 89*   BE -2     Blood Culture: No results for input(s): LABBLOO in the last 48 hours.  BMP:   Recent Labs   Lab 08/03/19  1208   *      K 3.8      CO2 23   BUN 35*   CREATININE 2.8*   CALCIUM 8.0*     CBC:   Recent Labs   Lab 08/03/19  1208   WBC 8.82   HGB 9.8*   HCT 29.9*        CMP:   Recent Labs   Lab 08/03/19  1208      K 3.8      CO2 23   *   BUN 35*   CREATININE 2.8*   CALCIUM 8.0*   PROT 5.6*   ALBUMIN 2.7*   BILITOT 0.4   ALKPHOS 49*   AST 14   ALT 18   ANIONGAP 7*   EGFRNONAA 21*     Lactic Acid:   Recent Labs   Lab 08/03/19  1208   LACTATE 0.6     Respiratory Culture: No results for input(s): GSRESP, RESPIRATORYC in the last 48  hours.  Troponin:   Recent Labs   Lab 08/03/19  1208   TROPONINI 0.242*     All pertinent labs within the past 24 hours have been reviewed.    Significant Imaging:   Imaging Results          X-Ray Chest AP Portable (Final result)  Result time 08/03/19 11:57:16    Final result by Morales Walsh MD (08/03/19 11:57:16)                 Impression:      Cardiomegaly with pulmonary edema.  Bilateral pleural fluid collections left greater than right with atelectasis or consolidation left lower lobe.      Electronically signed by: Morales Walsh  Date:    08/03/2019  Time:    11:57             Narrative:    EXAMINATION:  XR CHEST AP PORTABLE    CLINICAL HISTORY:  fatigue;    COMPARISON:  05/28/2019    FINDINGS:  Cardiomegaly with by lateral pulmonary congestion.  There is some consolidation atelectatic change in the left lung base with bilateral pleural fluid collections.

## 2019-08-03 NOTE — HPI
Carlitos Ellington is a 78 y.o. male patient with a PMHx of CABG, CVA, CAD, CHF, DM, GERD, HTN, HLD, and mild AI who presents to the Emergency Department for evaluation of generalized weakness which onset suddenly when he woke up this morning. Pt denies SOB at this time, but wife states he seems SOB. Symptoms are constant and moderate in severity. No exacerbating factors reported. Associated sxs include fatigue and fever. Pt is on continuous oxygen at 2L NC. Patient denies any CP, abd pain, hematuria, dysuria, blood in stool, difficulty urinating, frequency, n/v/d, leg swelling, palpitations, and all other sxs at this time. Per EMS pt's O2 sats dropped and was placed on 4L NC. Wife gave pt 2 tylenol x1 hour PTA. ED work-up resulted h/h of 9.8/29.9, BUN 35, Creatinine 2.8, BNP 1711, troponin 0.242, ABG: PO2 55, HCO3 22.7 on  2L NC. CXR notes pulmonary edema.  Bilateral pleural fluid collections left greater than right with atelectasis or consolidation left lower lobe. Hospital medicine called for admission. Patient is a DNR.

## 2019-08-03 NOTE — ASSESSMENT & PLAN NOTE
Continue ASA, plavix, Imdur, statin  No ACEi/ARB given baseline renal function  Cardiology following

## 2019-08-03 NOTE — ASSESSMENT & PLAN NOTE
Subjective fever 99.2  WBCs normal, afebrile    Blood cx   IV azithromycin and rocephin empirically

## 2019-08-03 NOTE — ASSESSMENT & PLAN NOTE
Elevated troponin likely due to demand ischemia/renal failure and CHF   Denies any chest pain   Initial troponin 0.242  Serial troponin's   No acute EKG changes   Cardiology following recommends heparin gtt and rule out PE    Heparin gtt initiated   VQ scan to r/o PE

## 2019-08-04 PROBLEM — J98.11 ATELECTASIS: Status: ACTIVE | Noted: 2019-08-04

## 2019-08-04 PROBLEM — J43.1 PANLOBULAR EMPHYSEMA: Status: ACTIVE | Noted: 2019-08-04

## 2019-08-04 PROBLEM — J90 PLEURAL EFFUSION, BILATERAL: Status: ACTIVE | Noted: 2019-08-04

## 2019-08-04 LAB
ANION GAP SERPL CALC-SCNC: 8 MMOL/L (ref 8–16)
AORTIC VALVE STENOSIS: ABNORMAL
APTT BLDCRRT: 37.4 SEC (ref 21–32)
APTT BLDCRRT: 37.9 SEC (ref 21–32)
APTT BLDCRRT: 39.5 SEC (ref 21–32)
APTT BLDCRRT: 54.4 SEC (ref 21–32)
BASOPHILS # BLD AUTO: 0.01 K/UL (ref 0–0.2)
BASOPHILS NFR BLD: 0.1 % (ref 0–1.9)
BUN SERPL-MCNC: 41 MG/DL (ref 8–23)
CALCIUM SERPL-MCNC: 8.7 MG/DL (ref 8.7–10.5)
CHLORIDE SERPL-SCNC: 104 MMOL/L (ref 95–110)
CO2 SERPL-SCNC: 26 MMOL/L (ref 23–29)
CREAT SERPL-MCNC: 3.2 MG/DL (ref 0.5–1.4)
DIASTOLIC DYSFUNCTION: YES
DIFFERENTIAL METHOD: ABNORMAL
EOSINOPHIL # BLD AUTO: 0 K/UL (ref 0–0.5)
EOSINOPHIL NFR BLD: 0.3 % (ref 0–8)
ERYTHROCYTE [DISTWIDTH] IN BLOOD BY AUTOMATED COUNT: 15.1 % (ref 11.5–14.5)
EST. GFR  (AFRICAN AMERICAN): 20 ML/MIN/1.73 M^2
EST. GFR  (NON AFRICAN AMERICAN): 18 ML/MIN/1.73 M^2
GLUCOSE SERPL-MCNC: 155 MG/DL (ref 70–110)
HCT VFR BLD AUTO: 29.3 % (ref 40–54)
HGB BLD-MCNC: 9.5 G/DL (ref 14–18)
LYMPHOCYTES # BLD AUTO: 0.5 K/UL (ref 1–4.8)
LYMPHOCYTES NFR BLD: 5.7 % (ref 18–48)
MCH RBC QN AUTO: 29.1 PG (ref 27–31)
MCHC RBC AUTO-ENTMCNC: 32.4 G/DL (ref 32–36)
MCV RBC AUTO: 90 FL (ref 82–98)
MITRAL VALVE MOBILITY: NORMAL
MONOCYTES # BLD AUTO: 1 K/UL (ref 0.3–1)
MONOCYTES NFR BLD: 11.4 % (ref 4–15)
NEUTROPHILS # BLD AUTO: 7.3 K/UL (ref 1.8–7.7)
NEUTROPHILS NFR BLD: 82.7 % (ref 38–73)
PLATELET # BLD AUTO: 222 K/UL (ref 150–350)
PMV BLD AUTO: 10.3 FL (ref 9.2–12.9)
POCT GLUCOSE: 138 MG/DL (ref 70–110)
POCT GLUCOSE: 198 MG/DL (ref 70–110)
POCT GLUCOSE: 281 MG/DL (ref 70–110)
POCT GLUCOSE: 410 MG/DL (ref 70–110)
POTASSIUM SERPL-SCNC: 3.9 MMOL/L (ref 3.5–5.1)
RBC # BLD AUTO: 3.27 M/UL (ref 4.6–6.2)
RETIRED EF AND QEF - SEE NOTES: 25 (ref 55–65)
SODIUM SERPL-SCNC: 138 MMOL/L (ref 136–145)
WBC # BLD AUTO: 8.79 K/UL (ref 3.9–12.7)

## 2019-08-04 PROCEDURE — 96366 THER/PROPH/DIAG IV INF ADDON: CPT

## 2019-08-04 PROCEDURE — 63600175 PHARM REV CODE 636 W HCPCS: Mod: HCNC | Performed by: INTERNAL MEDICINE

## 2019-08-04 PROCEDURE — 85730 THROMBOPLASTIN TIME PARTIAL: CPT | Mod: HCNC

## 2019-08-04 PROCEDURE — 99223 PR INITIAL HOSPITAL CARE,LEVL III: ICD-10-PCS | Mod: HCNC,,, | Performed by: INTERNAL MEDICINE

## 2019-08-04 PROCEDURE — 99223 1ST HOSP IP/OBS HIGH 75: CPT | Mod: HCNC,,, | Performed by: INTERNAL MEDICINE

## 2019-08-04 PROCEDURE — 25000242 PHARM REV CODE 250 ALT 637 W/ HCPCS: Mod: HCNC | Performed by: INTERNAL MEDICINE

## 2019-08-04 PROCEDURE — S5571 INSULIN DISPOS PEN 3 ML: HCPCS | Mod: HCNC | Performed by: INTERNAL MEDICINE

## 2019-08-04 PROCEDURE — 85730 THROMBOPLASTIN TIME PARTIAL: CPT | Mod: 91,HCNC

## 2019-08-04 PROCEDURE — 94799 UNLISTED PULMONARY SVC/PX: CPT | Mod: HCNC

## 2019-08-04 PROCEDURE — 80048 BASIC METABOLIC PNL TOTAL CA: CPT | Mod: HCNC

## 2019-08-04 PROCEDURE — 63600175 PHARM REV CODE 636 W HCPCS: Mod: HCNC | Performed by: NURSE PRACTITIONER

## 2019-08-04 PROCEDURE — 21400001 HC TELEMETRY ROOM: Mod: HCNC

## 2019-08-04 PROCEDURE — 94640 AIRWAY INHALATION TREATMENT: CPT | Mod: HCNC

## 2019-08-04 PROCEDURE — 85025 COMPLETE CBC W/AUTO DIFF WBC: CPT | Mod: HCNC

## 2019-08-04 PROCEDURE — 36415 COLL VENOUS BLD VENIPUNCTURE: CPT | Mod: HCNC

## 2019-08-04 PROCEDURE — 25000003 PHARM REV CODE 250: Mod: HCNC | Performed by: NURSE PRACTITIONER

## 2019-08-04 PROCEDURE — 94761 N-INVAS EAR/PLS OXIMETRY MLT: CPT | Mod: HCNC

## 2019-08-04 PROCEDURE — 27100171 HC OXYGEN HIGH FLOW UP TO 24 HOURS: Mod: HCNC

## 2019-08-04 PROCEDURE — S5571 INSULIN DISPOS PEN 3 ML: HCPCS | Mod: HCNC | Performed by: NURSE PRACTITIONER

## 2019-08-04 RX ORDER — BUDESONIDE 0.5 MG/2ML
0.5 INHALANT ORAL 2 TIMES DAILY
Status: DISCONTINUED | OUTPATIENT
Start: 2019-08-04 | End: 2019-08-06 | Stop reason: HOSPADM

## 2019-08-04 RX ORDER — FUROSEMIDE 10 MG/ML
80 INJECTION INTRAMUSCULAR; INTRAVENOUS 3 TIMES DAILY
Status: DISCONTINUED | OUTPATIENT
Start: 2019-08-04 | End: 2019-08-06 | Stop reason: HOSPADM

## 2019-08-04 RX ORDER — RAMELTEON 8 MG/1
8 TABLET ORAL NIGHTLY PRN
Status: DISCONTINUED | OUTPATIENT
Start: 2019-08-04 | End: 2019-08-06 | Stop reason: HOSPADM

## 2019-08-04 RX ORDER — IPRATROPIUM BROMIDE AND ALBUTEROL SULFATE 2.5; .5 MG/3ML; MG/3ML
3 SOLUTION RESPIRATORY (INHALATION) EVERY 6 HOURS
Status: DISCONTINUED | OUTPATIENT
Start: 2019-08-04 | End: 2019-08-06 | Stop reason: HOSPADM

## 2019-08-04 RX ORDER — DOBUTAMINE HYDROCHLORIDE 400 MG/100ML
1 INJECTION INTRAVENOUS CONTINUOUS
Status: DISCONTINUED | OUTPATIENT
Start: 2019-08-04 | End: 2019-08-06 | Stop reason: HOSPADM

## 2019-08-04 RX ADMIN — INSULIN ASPART 2 UNITS: 100 INJECTION, SOLUTION INTRAVENOUS; SUBCUTANEOUS at 11:08

## 2019-08-04 RX ADMIN — HYDRALAZINE HYDROCHLORIDE 100 MG: 50 TABLET ORAL at 08:08

## 2019-08-04 RX ADMIN — INSULIN DETEMIR 5 UNITS: 100 INJECTION, SOLUTION SUBCUTANEOUS at 11:08

## 2019-08-04 RX ADMIN — CLOPIDOGREL BISULFATE 75 MG: 75 TABLET ORAL at 08:08

## 2019-08-04 RX ADMIN — INSULIN ASPART 5 UNITS: 100 INJECTION, SOLUTION INTRAVENOUS; SUBCUTANEOUS at 10:08

## 2019-08-04 RX ADMIN — HEPARIN SODIUM 16 UNITS/KG/HR: 10000 INJECTION, SOLUTION INTRAVENOUS at 05:08

## 2019-08-04 RX ADMIN — PRAZOSIN HYDROCHLORIDE 1 MG: 1 CAPSULE ORAL at 08:08

## 2019-08-04 RX ADMIN — ROSUVASTATIN CALCIUM 40 MG: 10 TABLET, COATED ORAL at 10:08

## 2019-08-04 RX ADMIN — FUROSEMIDE 40 MG: 10 INJECTION, SOLUTION INTRAMUSCULAR; INTRAVENOUS at 08:08

## 2019-08-04 RX ADMIN — ISOSORBIDE DINITRATE 20 MG: 10 TABLET ORAL at 03:08

## 2019-08-04 RX ADMIN — PRAZOSIN HYDROCHLORIDE 1 MG: 1 CAPSULE ORAL at 10:08

## 2019-08-04 RX ADMIN — PANTOPRAZOLE SODIUM 40 MG: 40 TABLET, DELAYED RELEASE ORAL at 08:08

## 2019-08-04 RX ADMIN — METOPROLOL SUCCINATE 50 MG: 50 TABLET, EXTENDED RELEASE ORAL at 08:08

## 2019-08-04 RX ADMIN — AZITHROMYCIN MONOHYDRATE 500 MG: 500 INJECTION, POWDER, LYOPHILIZED, FOR SOLUTION INTRAVENOUS at 03:08

## 2019-08-04 RX ADMIN — FUROSEMIDE 80 MG: 10 INJECTION, SOLUTION INTRAMUSCULAR; INTRAVENOUS at 10:08

## 2019-08-04 RX ADMIN — IPRATROPIUM BROMIDE AND ALBUTEROL SULFATE 3 ML: .5; 3 SOLUTION RESPIRATORY (INHALATION) at 01:08

## 2019-08-04 RX ADMIN — INSULIN ASPART 5 UNITS: 100 INJECTION, SOLUTION INTRAVENOUS; SUBCUTANEOUS at 11:08

## 2019-08-04 RX ADMIN — BUDESONIDE 0.5 MG: 0.5 SUSPENSION RESPIRATORY (INHALATION) at 08:08

## 2019-08-04 RX ADMIN — ASPIRIN 81 MG: 81 TABLET, COATED ORAL at 08:08

## 2019-08-04 RX ADMIN — ISOSORBIDE DINITRATE 20 MG: 10 TABLET ORAL at 08:08

## 2019-08-04 RX ADMIN — DOBUTAMINE IN DEXTROSE 2.5 MCG/KG/MIN: 400 INJECTION, SOLUTION INTRAVENOUS at 10:08

## 2019-08-04 RX ADMIN — METHYLPREDNISOLONE SODIUM SUCCINATE 80 MG: 40 INJECTION, POWDER, FOR SOLUTION INTRAMUSCULAR; INTRAVENOUS at 10:08

## 2019-08-04 RX ADMIN — HEPARIN SODIUM 14 UNITS/KG/HR: 10000 INJECTION, SOLUTION INTRAVENOUS at 03:08

## 2019-08-04 RX ADMIN — HEPARIN SODIUM 14 UNITS/KG/HR: 10000 INJECTION, SOLUTION INTRAVENOUS at 10:08

## 2019-08-04 RX ADMIN — AMLODIPINE BESYLATE 10 MG: 10 TABLET ORAL at 08:08

## 2019-08-04 RX ADMIN — FUROSEMIDE 80 MG: 10 INJECTION, SOLUTION INTRAMUSCULAR; INTRAVENOUS at 03:08

## 2019-08-04 RX ADMIN — IPRATROPIUM BROMIDE AND ALBUTEROL SULFATE 3 ML: .5; 3 SOLUTION RESPIRATORY (INHALATION) at 08:08

## 2019-08-04 RX ADMIN — CEFTRIAXONE 1 G: 1 INJECTION, SOLUTION INTRAVENOUS at 04:08

## 2019-08-04 RX ADMIN — HYDRALAZINE HYDROCHLORIDE 100 MG: 50 TABLET ORAL at 10:08

## 2019-08-04 RX ADMIN — ISOSORBIDE DINITRATE 20 MG: 10 TABLET ORAL at 10:08

## 2019-08-04 RX ADMIN — INSULIN DETEMIR 10 UNITS: 100 INJECTION, SOLUTION SUBCUTANEOUS at 10:08

## 2019-08-04 RX ADMIN — SERTRALINE HYDROCHLORIDE 50 MG: 50 TABLET ORAL at 08:08

## 2019-08-04 RX ADMIN — METHYLPREDNISOLONE SODIUM SUCCINATE 80 MG: 40 INJECTION, POWDER, FOR SOLUTION INTRAMUSCULAR; INTRAVENOUS at 01:08

## 2019-08-04 RX ADMIN — INSULIN ASPART 6 UNITS: 100 INJECTION, SOLUTION INTRAVENOUS; SUBCUTANEOUS at 04:08

## 2019-08-04 NOTE — ASSESSMENT & PLAN NOTE
Elevated troponin likely due to demand ischemia/renal failure and CHF   Denies any chest pain   Initial troponin 0.242  Serial troponin's   No acute EKG changes   Cardiology following recommends heparin gtt and rule out PE    Heparin gtt initiated   VQ scan to r/o PE   8/4/19-  Cardiology is following

## 2019-08-04 NOTE — PLAN OF CARE
Problem: Adult Inpatient Plan of Care  Goal: Rounds/Family Conference  Outcome: Ongoing (interventions implemented as appropriate)  Plan of care reviewed with patient and family

## 2019-08-04 NOTE — PLAN OF CARE
Informed patient of observation status , advised patient status may change per provider if needed . Patient understand ,signed , and received written notification . Informed patient of financial services contact number [phone] if needed. No further action taken .

## 2019-08-04 NOTE — ASSESSMENT & PLAN NOTE
2/2 acute heart failure exacerbation.    IV diuresis        Continuous oxygen   8/4/19-  Pt is requiring high flow O2 via NC at 15L/min to maintain satisfactory SpO2  Wean FiO2 as tolerated   Increase  IV Diuresis   Continue Antibiotic (Rocephin and Azithromycin )  Add Solumedrol IV short course   Continue bronchodilators

## 2019-08-04 NOTE — SUBJECTIVE & OBJECTIVE
Past Medical History:   Diagnosis Date    Aortic stenosis, mild     Carotid stenosis     [facility]% [facility]    Chronic kidney disease, stage IV (severe)     Congestive heart failure, NYHA class 3, chronic, systolic     Coronary artery disease     Diabetes mellitus with no complication     GERD (gastroesophageal reflux disease)     History of CVA (cerebrovascular accident) 11/2018    MRI  reveals right parietal lobe infarct    Hx of CABG 5/28/2019    Hyperlipidemia associated with type 2 diabetes mellitus     Hypertension associated with diabetes     Mild AI (aortic insufficiency)     Moderate mitral regurgitation     Type II diabetes mellitus with renal manifestations        Past Surgical History:   Procedure Laterality Date    CORONARY ARTERY BYPASS GRAFT      CORONARY STENT PLACEMENT      KIDNEY STONE SURGERY         Review of patient's allergies indicates:   Allergen Reactions    Influenza virus vaccines Other (See Comments)     Red streaks up arm    Isosorbide (solution)      Headache    Penicillins Swelling       Family History     None        Tobacco Use    Smoking status: Former Smoker     Packs/day: 1.00     Years: 20.00     Pack years: 20.00     Types: Cigarettes    Smokeless tobacco: Current User     Types: Snuff   Substance and Sexual Activity    Alcohol use: No    Drug use: No    Sexual activity: Not on file         Review of Systems   Constitutional: Positive for diaphoresis and fatigue.   HENT: Positive for congestion.    Eyes: Negative.    Respiratory: Positive for shortness of breath.    Cardiovascular: Positive for leg swelling.   Gastrointestinal: Positive for constipation.   Endocrine: Positive for cold intolerance.   Genitourinary: Negative.    Musculoskeletal: Positive for arthralgias.   Neurological: Positive for dizziness and weakness.   Hematological: Negative.    Psychiatric/Behavioral: Negative.      Objective:     Vital Signs (Most  Recent):  Temp: 96.2 °F (35.7 °C) (08/04/19 1126)  Pulse: 81 (08/04/19 1320)  Resp: 18 (08/04/19 1320)  BP: (!) 151/66 (08/04/19 1126)  SpO2: (!) 92 % (08/04/19 1320) Vital Signs (24h Range):  Temp:  [96.2 °F (35.7 °C)-98.2 °F (36.8 °C)] 96.2 °F (35.7 °C)  Pulse:  [61-93] 81  Resp:  [16-20] 18  SpO2:  [86 %-96 %] 92 %  BP: (113-151)/(53-66) 151/66     Weight: 94.7 kg (208 lb 12.4 oz)  Body mass index is 30.83 kg/m².      Intake/Output Summary (Last 24 hours) at 8/4/2019 1447  Last data filed at 8/4/2019 1300  Gross per 24 hour   Intake 830.46 ml   Output 500 ml   Net 330.46 ml       Physical Exam   Constitutional: He is oriented to person, place, and time.   Chronically ill appearing     HENT:   Head: Normocephalic and atraumatic.   Eyes: Pupils are equal, round, and reactive to light. Conjunctivae are normal.   Neck: Neck supple. JVD present. No tracheal deviation present. No thyromegaly present.   Cardiovascular: Normal rate. A regularly irregular rhythm present. PMI is displaced.   Murmur heard.   Systolic murmur is present with a grade of 2/6.  Pulmonary/Chest: Effort normal. He has decreased breath sounds. He has rales in the right lower field and the left lower field.   Abdominal: Soft.   Musculoskeletal: Normal range of motion. He exhibits edema.   Lymphadenopathy:     He has no cervical adenopathy.   Neurological: He is alert and oriented to person, place, and time.   Skin: Skin is warm and dry.   Nursing note and vitals reviewed.      Vents:  Oxygen Concentration (%): 50 (08/03/19 2014)    Lines/Drains/Airways     Peripheral Intravenous Line                 Peripheral IV - Single Lumen 08/03/19 20 G Right Wrist 1 day         Peripheral IV - Single Lumen 08/04/19 1130 Anterior;Distal;Left Forearm less than 1 day                Significant Labs:    CBC/Anemia Profile:  Recent Labs   Lab 08/03/19  1208 08/04/19  0456   WBC 8.82 8.79   HGB 9.8* 9.5*   HCT 29.9* 29.3*    222   MCV 90 90   RDW 14.9* 15.1*         Chemistries:  Recent Labs   Lab 19  1208 19  1636 19  0456     --  138   K 3.8  --  3.9     --  104   CO2 23  --  26   BUN 35*  --  41*   CREATININE 2.8*  --  3.2*   CALCIUM 8.0*  --  8.7   ALBUMIN 2.7*  --   --    PROT 5.6*  --   --    BILITOT 0.4  --   --    ALKPHOS 49*  --   --    ALT 18  --   --    AST 14  --   --    MG  --  1.8  --    PHOS  --  3.2  --        ABGs:   Recent Labs   Lab 19  1146   PH 7.408   PCO2 36.0   HCO3 22.7*   POCSATURATED 89*   BE -2     BMP:   Recent Labs   Lab 19  1636 19  0456   GLU  --  155*   NA  --  138   K  --  3.9   CL  --  104   CO2  --  26   BUN  --  41*   CREATININE  --  3.2*   CALCIUM  --  8.7   MG 1.8  --      CMP:   Recent Labs   Lab 19  1208 19  0456    138   K 3.8 3.9    104   CO2 23 26   * 155*   BUN 35* 41*   CREATININE 2.8* 3.2*   CALCIUM 8.0* 8.7   PROT 5.6*  --    ALBUMIN 2.7*  --    BILITOT 0.4  --    ALKPHOS 49*  --    AST 14  --    ALT 18  --    ANIONGAP 7* 8   EGFRNONAA 21* 18*     Troponin:   Recent Labs   Lab 19  1208 19  1635 19  2203   TROPONINI 0.242* 0.358*  0.350* 0.321*     All pertinent labs within the past 24 hours have been reviewed.    Significant Imaging:   CT: I have reviewed all pertinent results/findings within the past 24 hours and my personal findings are:  Radiology Result     Radiology Result      Name:   :  Patient MRN:   Carlitos Ellington 1940 [id number]   Account Number: Room & Bed Accession Number:   [id number] [facility] [id number]   Authorizing Physician: Patient Class: Diagnosis:   Shameem Jones OP- Observation     Procedure:  Exam Date: Reason for Exam:   CT Chest Without Contrast 2019 Shortness of breath SOB, pulmonary edema suspected persistent hypoxia             RESULTS:     EXAMINATION:  CT CHEST WITHOUT CONTRAST     CLINICAL HISTORY:  Shortness of breath;SOB, pulmonary edema suspected;persistent  hypoxia;     COMPARISON:  Chest x-ray 08/03/2019     FINDINGS:  Centrilobular emphysematous changes noted with scattered areas of   increased attenuation in both lung fields possibly representing edema.    Atelectatic change in both lung bases right greater than left.  8 mm   pulmonary nodule in the lateral right upper lobe.  Small bilateral pleural   fluid collections noted.  Loculated component suspected along the left   lateral chest wall.  The heart is enlarged.  There are aortic and coronary   artery calcifications.  No mediastinal adenopathy is identified.    Sternotomy wires.  No acute osseous abnormality is identified.     Impression:     Emphysematous changes with questionable pulmonary congestion.  Bibasilar   atelectatic change with bilateral pleural fluid collections.  Suspect   small loculated component of pleural fluid along the left lateral chest   wall.  8 mm pulmonary nodule right upper lobe.  Follow-up recommended per   Fleischner society guidelines.     All CT scans at this facility use dose modulation, iterative   reconstruction, and/or weight based dosing when appropriate to reduce   radiation dose to as low as reasonably achievable.        Electronically signed by:     Morales Walsh  Date:                                    08/04/2019  Time:                                   09:36                    Signed By:  Morales Walsh MD on 8/4/2019  9:36 AM

## 2019-08-04 NOTE — PROGRESS NOTES
[facility] Hospital Medicine  Progress Note    Patient Name: Carlitos Ellington  MRN: [id number]  Patient Class: IP- Inpatient   Admission Date: 8/3/2019  Length of Stay: 0 days  Attending Physician: Kylah Goldman MD  Primary Care Provider: Raza Uribe MD        Subjective:     Principal Problem:Hypoxemia      HPI:  Carlitos Ellington is a 78 y.o. male patient with a PMHx of CABG, CVA, CAD, CHF, DM, GERD, HTN, HLD, and mild AI who presents to the Emergency Department for evaluation of generalized weakness which onset suddenly when he woke up this morning. Pt denies SOB at this time, but wife states he seems SOB. Symptoms are constant and moderate in severity. No exacerbating factors reported. Associated sxs include fatigue and fever. Pt is on continuous oxygen at 2L NC. Patient denies any CP, abd pain, hematuria, dysuria, blood in stool, difficulty urinating, frequency, n/v/d, leg swelling, palpitations, and all other sxs at this time.  Per EMS pt's O2 sats dropped and was placed on 4L NC. Wife gave pt 2 tylenol x1 hour PTA.  ED work-up resulted h/h of 9.8/29.9, BUN 35, Creatinine 2.8, BNP 1711, troponin 0.242, ABG: PO2 55, HCO3 22.7 on  2L NC. CXR notes pulmonary edema.  Bilateral pleural fluid collections left greater than right with atelectasis or consolidation left lower lobe. Hospital medicine called for admission. Patient is a DNR.     Overview/Hospital Course:  8/4/19 -  Pt is seen at bedside . Expressed that he would like to see a Pulmonologist while in the hospital . He is O2 dependent at home at 2L/min. Currently requiring high flow O2 via NC. Denies chest pain but report some SOB and subjective fever . Tmax 99.2 since admission.     CT chest done this morning . Report is reviewed .Positive emphysematous changes are suggested . Pt is a former smoker.    Cardiology and Nephrology consults were obtained     Interval History:   On high flow O2 via NC 15L/min  Cardiology started on Heparin  and Dobutamine drip   On Rocephin and Azithromycin for antibiotic coverage   Add Solumedrol 80 mg IV x 3 doses   Continue bronchodilators and inhalational corticosteroid     Review of Systems   Constitutional: Positive for activity change, appetite change, chills, fatigue and fever.   HENT: Negative for sore throat.    Eyes: Negative for visual disturbance.   Respiratory: Positive for shortness of breath. Negative for cough and chest tightness.    Cardiovascular: Positive for leg swelling. Negative for chest pain and palpitations.   Gastrointestinal: Negative for abdominal distention, abdominal pain, constipation, diarrhea, nausea and vomiting.   Endocrine: Negative for polyuria.   Genitourinary: Negative for decreased urine volume, dysuria, flank pain, frequency and hematuria.   Musculoskeletal: Negative for back pain and gait problem.   Skin: Negative for rash.   Neurological: Negative for syncope, speech difficulty, weakness, light-headedness and headaches.   Psychiatric/Behavioral: Negative for confusion, hallucinations and sleep disturbance.     Objective:     Vital Signs (Most Recent):  Temp: 96.2 °F (35.7 °C) (08/04/19 1126)  Pulse: 81 (08/04/19 1320)  Resp: 18 (08/04/19 1320)  BP: (!) 151/66 (08/04/19 1126)  SpO2: (!) 92 % (08/04/19 1320) Vital Signs (24h Range):  Temp:  [96.2 °F (35.7 °C)-98.2 °F (36.8 °C)] 96.2 °F (35.7 °C)  Pulse:  [61-93] 81  Resp:  [16-20] 18  SpO2:  [86 %-96 %] 92 %  BP: (113-151)/(53-92) 151/66     Weight: 94.7 kg (208 lb 12.4 oz)  Body mass index is 30.83 kg/m².    Intake/Output Summary (Last 24 hours) at 8/4/2019 1322  Last data filed at 8/4/2019 1042  Gross per 24 hour   Intake 590.46 ml   Output 300 ml   Net 290.46 ml      Physical Exam   Constitutional: He is oriented to person, place, and time. No distress.   HENT:   Head: Normocephalic and atraumatic.   Eyes: EOM are normal.   Neck: Normal range of motion. Neck supple.   Cardiovascular: Normal rate, regular rhythm and normal  heart sounds.   Pulmonary/Chest: No respiratory distress. He has rales.   Coarse breath  Sounds    Abdominal: Soft. Bowel sounds are normal. There is no guarding.   Musculoskeletal: He exhibits no edema.   Neurological: He is alert and oriented to person, place, and time.   Skin: Skin is warm and dry.       Significant Labs:   CBC:   Recent Labs   Lab 08/03/19  1208 08/04/19  0456   WBC 8.82 8.79   HGB 9.8* 9.5*   HCT 29.9* 29.3*    222     CMP:   Recent Labs   Lab 08/03/19  1208 08/04/19  0456    138   K 3.8 3.9    104   CO2 23 26   * 155*   BUN 35* 41*   CREATININE 2.8* 3.2*   CALCIUM 8.0* 8.7   PROT 5.6*  --    ALBUMIN 2.7*  --    BILITOT 0.4  --    ALKPHOS 49*  --    AST 14  --    ALT 18  --    ANIONGAP 7* 8   EGFRNONAA 21* 18*       Significant Imaging:  CT Chest Without Contrast [[id number]] Resulted: 08/04/19 0936   Order Status: Completed Updated: 08/04/19 0938   Narrative:     EXAMINATION:  CT CHEST WITHOUT CONTRAST    CLINICAL HISTORY:  Shortness of breath;SOB, pulmonary edema suspected;persistent hypoxia;    COMPARISON:  Chest x-ray 08/03/2019    FINDINGS:  Centrilobular emphysematous changes noted with scattered areas of increased attenuation in both lung fields possibly representing edema.  Atelectatic change in both lung bases right greater than left.  8 mm pulmonary nodule in the lateral right upper lobe.  Small bilateral pleural fluid collections noted.  Loculated component suspected along the left lateral chest wall.  The heart is enlarged.  There are aortic and coronary artery calcifications.  No mediastinal adenopathy is identified.  Sternotomy wires.  No acute osseous abnormality is identified.   Impression:       Emphysematous changes with questionable pulmonary congestion.  Bibasilar atelectatic change with bilateral pleural fluid collections.  Suspect small loculated component of pleural fluid along the left lateral chest wall.  8 mm pulmonary nodule right upper lobe.   Follow-up recommended per Fleischner society guidelines.    All CT scans at this facility use dose modulation, iterative reconstruction, and/or weight based dosing when appropriate to reduce radiation dose to as low as reasonably achievable.            Assessment/Plan:      * Acute on Chronic hypoxic respiratory failure   2/2 acute heart failure exacerbation.    IV diuresis        Continuous oxygen   8/4/19-  Pt is requiring high flow O2 via NC at 15L/min to maintain satisfactory SpO2  Wean FiO2 as tolerated   Increase  IV Diuresis   Continue Antibiotic (Rocephin and Azithromycin )  Add Solumedrol IV short course   Continue bronchodilators     Pneumonia left lower lobe  Subjective fever 99.2  WBCs normal, afebrile    Blood cx   IV azithromycin and rocephin empirically   8/4/19-  Continue Antibiotic IV      Elevated troponin  Elevated troponin likely due to demand ischemia/renal failure and CHF   Denies any chest pain   Initial troponin 0.242  Serial troponin's   No acute EKG changes   Cardiology following recommends heparin gtt and rule out PE    Heparin gtt initiated   VQ scan to r/o PE   8/4/19-  Cardiology is following               Acute on chronic combined systolic and diastolic heart failure  IV diuresis and  Fluid/Sodium restriction.    Daily weights and strict I&O.    Cardiology following   Continue ASA, BB, Statin    2d echo    Troponin 0.242  Serial troponin's    No acute EKG changes  8/4/19-  Cardiology consult obtained .  Pt is started on Dobutamine drip.  Continue IV diuresis     Chronic kidney disease, stage IV (severe)  Creatinine 2.8 (Baseline 2.3-2.7)  IV diuresis   Careful fluid management.    Monitor urine output and renal function chemistries.    Nephrology consult pending course   8/4/19 -  Nephrology consult obtained     CAD, multiple vessel  Continue ASA, plavix, Imdur, statin  No ACEi/ARB given baseline renal function  Cardiology following       Carotid stenosis  Continue Plavix        Hyperlipidemia associated with type 2 diabetes mellitus  Continue Statin       Hypertension associated with diabetes  Continue hydralazine, prazosin, and amlodipine  Monitor          VTE Risk Mitigation (From admission, onward)        Ordered     heparin 25,000 units in dextrose 5% (100 units/ml) IV bolus from bag - ADDITIONAL PRN BOLUS - 60 units/kg (max bolus 4000 units)  As needed (PRN)      08/04/19 0317     heparin 25,000 units in dextrose 5% (100 units/ml) IV bolus from bag - ADDITIONAL PRN BOLUS - 30 units/kg (max bolus 4000 units)  As needed (PRN)      08/04/19 0317     heparin 25,000 units in dextrose 5% 250 mL (100 units/mL) infusion LOW INTENSITY nomogram - [facility]  Continuous      08/03/19 1455     IP VTE HIGH RISK PATIENT  Once      08/03/19 1403                Kylah Goldman MD  Department of Hospital Medicine   [facility]

## 2019-08-04 NOTE — ASSESSMENT & PLAN NOTE
Subjective fever 99.2  WBCs normal, afebrile    Blood cx   IV azithromycin and rocephin empirically   8/4/19-  Continue Antibiotic IV

## 2019-08-04 NOTE — ASSESSMENT & PLAN NOTE
Cont hep drip  Likely sec to demand ischemia  May need repeat ischemic workup if none recently but discuss palliative approach with pt and fam

## 2019-08-04 NOTE — CONSULTS
[facility]  Cardiology  Consult Note    Patient Name: Carlitos Ellington  MRN: [id number]  Admission Date: 8/3/2019  Hospital Length of Stay: 0 days  Code Status: DNR   Attending Provider: Kylah Goldman MD   Consulting Provider: Loc Brown Md, MD  Primary Care Physician: Raza Uribe MD  Principal Problem:Hypoxemia    Patient information was obtained from patient, past medical records and ER records.     Inpatient consult to Cardiology  Consult performed by: Loc Brown MD  Consult ordered by: Ivy Horn NP  Reason for consult: CHF        Subjective:     Chief Complaint:  fatigue     HPI:   Carlitos Ellington is a 78 y.o. male patient with a PMHx of CABG, CVA, CAD, CHF, DM, GERD, HTN, HLD, and mild AI who presents to the Emergency Department for evaluation of generalized weakness which onset suddenly when he woke up in morning. Info obtained from chart.  Pt denies SOB at this time, but wife states he seems SOB. Symptoms are constant and moderate in severity. No exacerbating factors reported. Associated sxs include fatigue and fever. Pt is on continuous oxygen at 2L NC. Patient denies any CP, abd pain, hematuria, dysuria, blood in stool, difficulty urinating, frequency, n/v/d, leg swelling, palpitations, and all other sxs at this time.  Per EMS pt's O2 sats dropped and was placed on 4L NC. Wife gave pt 2 tylenol x1 hour PTA.  ED work-up resulted h/h of 9.8/29.9, BUN 35, Creatinine 2.8, BNP 1711, troponin 0.242, ABG: PO2 55, HCO3 22.7 on  2L NC. CXR notes pulmonary edema.  Bilateral pleural fluid collections left greater than right with atelectasis or consolidation left lower lobe. Cardiology consult for CHF.    Started pt on DBT 2.5 for diuresis, renal function is worse, trop rising to 0.358, on heparin drip. Need records from primary cardiologist.  Discussed palliation also as he is on DBT now.     Past Medical History:   Diagnosis Date    Aortic stenosis, mild     Carotid stenosis      50% [facility]    Chronic kidney disease, stage IV (severe)     Congestive heart failure, NYHA class 3, chronic, systolic     Coronary artery disease     Diabetes mellitus with no complication     GERD (gastroesophageal reflux disease)     History of CVA (cerebrovascular accident) 11/2018    MRI  reveals right parietal lobe infarct    Hx of CABG 5/28/2019    Hyperlipidemia associated with type 2 diabetes mellitus     Hypertension associated with diabetes     Mild AI (aortic insufficiency)     Moderate mitral regurgitation     Type II diabetes mellitus with renal manifestations        Past Surgical History:   Procedure Laterality Date    CORONARY ARTERY BYPASS GRAFT      CORONARY STENT PLACEMENT      KIDNEY STONE SURGERY         Review of patient's allergies indicates:   Allergen Reactions    Influenza virus vaccines Other (See Comments)     Red streaks up arm    Isosorbide (solution)      Headache    Penicillins Swelling       No current facility-administered medications on file prior to encounter.      Current Outpatient Medications on File Prior to Encounter   Medication Sig    amlodipine (NORVASC) 10 MG tablet Take 10 mg by mouth once daily.    clopidogrel (PLAVIX) 75 mg tablet Take 1 tablet (75 mg total) by mouth once daily.    furosemide (LASIX) 40 MG tablet Take 1 tablet (40 mg total) by mouth once daily.    hydrALAZINE (APRESOLINE) 100 MG tablet Take 1 tablet (100 mg total) by mouth 2 (two) times daily.    insulin glargine (LANTUS) 100 unit/mL injection Inject 35 Units into the skin every evening.    isosorbide dinitrate (ISORDIL) 20 MG tablet Take 1 tablet (20 mg total) by mouth 3 (three) times daily.    metoprolol succinate (TOPROL-XL) 50 MG 24 hr tablet Take 50 mg by mouth once daily.    omeprazole (PRILOSEC) 20 MG capsule Take 20 mg by mouth once daily.    prazosin (MINIPRESS) 2 MG Cap Take 1 mg by mouth 2 (two) times daily.    rosuvastatin  (CRESTOR) 40 MG Tab Take 1 tablet (40 mg total) by mouth every evening.    sertraline (ZOLOFT) 50 MG tablet Take 1 tablet (50 mg total) by mouth once daily.    ACCU-CHEK MASHA PLUS Strp     aspirin 325 MG tablet Take 81 mg by mouth once daily.     ondansetron (ZOFRAN-ODT) 4 MG TbDL Take 1 tablet (4 mg total) by mouth every 8 (eight) hours as needed (prn nausea/vomiting).    prednisoLONE acetate (PRED FORTE) 1 % DrpS      Family History     None        Tobacco Use    Smoking status: Former Smoker     Packs/day: 1.00     Years: 20.00     Pack years: 20.00     Types: Cigarettes    Smokeless tobacco: Current User     Types: Snuff   Substance and Sexual Activity    Alcohol use: No    Drug use: No    Sexual activity: Not on file     Review of Systems   Constitution: Positive for decreased appetite and malaise/fatigue.   HENT: Negative.    Eyes: Negative.    Cardiovascular: Positive for dyspnea on exertion.   Respiratory: Positive for shortness of breath.    Endocrine: Negative.    Hematologic/Lymphatic: Negative.    Skin: Negative.    Musculoskeletal: Negative.    Gastrointestinal: Negative.    Genitourinary: Negative.    Neurological: Negative.    Psychiatric/Behavioral: Negative.    Allergic/Immunologic: Negative.    All other systems reviewed and are negative.    Objective:     Vital Signs (Most Recent):  Temp: 97.2 °F (36.2 °C) (08/04/19 1517)  Pulse: 97 (08/04/19 1524)  Resp: 17 (08/04/19 1517)  BP: (!) 123/58 (08/04/19 1517)  SpO2: (!) 94 % (08/04/19 1517) Vital Signs (24h Range):  Temp:  [96.2 °F (35.7 °C)-98.2 °F (36.8 °C)] 97.2 °F (36.2 °C)  Pulse:  [61-97] 97  Resp:  [16-20] 17  SpO2:  [88 %-96 %] 94 %  BP: (113-151)/(53-66) 123/58     Weight: 94.7 kg (208 lb 12.4 oz)  Body mass index is 30.83 kg/m².    SpO2: (!) 94 %  O2 Device (Oxygen Therapy): nasal cannula      Intake/Output Summary (Last 24 hours) at 8/4/2019 1633  Last data filed at 8/4/2019 1608  Gross per 24 hour   Intake 1512.86 ml   Output  725 ml   Net 787.86 ml       Lines/Drains/Airways     Peripheral Intravenous Line                 Peripheral IV - Single Lumen 08/03/19 20 G Right Wrist 1 day         Peripheral IV - Single Lumen 08/04/19 1130 Anterior;Distal;Left Forearm less than 1 day                Physical Exam   Constitutional: He is oriented to person, place, and time. He appears distressed.   HENT:   Head: Normocephalic and atraumatic.   Nose: Nose normal.   Mouth/Throat: Oropharynx is clear and moist.   Eyes: Conjunctivae and EOM are normal. No scleral icterus.   Neck: Normal range of motion. Neck supple. JVD present. No thyromegaly present.   Cardiovascular: Normal rate, regular rhythm, S1 normal and S2 normal. Exam reveals no gallop, no S3, no S4 and no friction rub.   Murmur heard.  Pulmonary/Chest: Effort normal. No stridor. No respiratory distress. He has no wheezes. He has rales. He exhibits no tenderness.   Abdominal: Soft. Bowel sounds are normal. He exhibits no distension and no mass. There is no tenderness. There is no rebound.   Genitourinary:   Genitourinary Comments: Deferred   Musculoskeletal: Normal range of motion. He exhibits edema. He exhibits no tenderness or deformity.   Lymphadenopathy:     He has no cervical adenopathy.   Neurological: He is alert and oriented to person, place, and time. He exhibits normal muscle tone. Coordination normal.   Skin: Skin is warm and dry. No rash noted. He is not diaphoretic. No erythema. No pallor.   Psychiatric: He has a normal mood and affect. His behavior is normal. Judgment and thought content normal.   Nursing note and vitals reviewed.      Significant Labs:   All pertinent lab results from the last 24 hours have been reviewed. and   Recent Lab Results       08/04/19  1125   08/04/19  0906   08/04/19  0643   08/04/19  0456   08/04/19  0021        Procalcitonin               Anion Gap       8       aPTT   39.5  Comment:  aPTT therapeutic range = 39-69 seconds   54.4  Comment:  aPTT  therapeutic range = 39-69 seconds 37.4  Comment:  aPTT therapeutic range = 39-69 seconds     Baso #       0.01       Basophil%       0.1       Blood Culture, Routine               BUN, Bld       41       Calcium       8.7       Chloride       104       CO2       26       Creatinine       3.2       Differential Method       Automated       eGFR if        20       eGFR if non        18  Comment:  Calculation used to obtain the estimated glomerular filtration  rate (eGFR) is the CKD-EPI equation.          Eos #       0.0       Eosinophil%       0.3       Glucose       155       Gran # (ANC)       7.3       Gran%       82.7       Hematocrit       29.3       Hemoglobin       9.5       Coumadin Monitoring INR               Lymph #       0.5       Lymph%       5.7       Magnesium               MCH       29.1       MCHC       32.4       MCV       90       Mono #       1.0       Mono%       11.4       MPV       10.3       Phosphorus               Platelets       222       POCT Glucose 198   138         Potassium       3.9       Protime               RBC       3.27       RDW       15.1       Sodium       138       Troponin I               WBC       8.79                        08/03/19  2203   08/03/19  2201   08/03/19  1751   08/03/19  1703   08/03/19  1636        Procalcitonin               Anion Gap               aPTT               Baso #               Basophil%               Blood Culture, Routine       No Growth to date[P] No Growth to date[P]     BUN, Bld               Calcium               Chloride               CO2               Creatinine               Differential Method               eGFR if                eGFR if non                Eos #               Eosinophil%               Glucose               Gran # (ANC)               Gran%               Hematocrit               Hemoglobin               Coumadin Monitoring INR               Lymph #                Lymph%               Magnesium         1.8     MCH               MCHC               MCV               Mono #               Mono%               MPV               Phosphorus         3.2     Platelets               POCT Glucose   177 106         Potassium               Protime               RBC               RDW               Sodium               Troponin I 0.321  Comment:  The reference interval for Troponin I represents the 99th percentile   cutoff   for our facility and is consistent with 3rd generation assay   performance.               WBC                                08/03/19  1635        Procalcitonin 0.18  Comment:  A concentration < 0.25 ng/mL represents a low risk bacterial   infection.  Procalcitonin may not be accurate among patients with localized   infection, recent trauma or major surgery, immunosuppressed state,   invasive fungal infection, renal dysfunction. Decisions regarding   initiation or continuation of antibiotic therapy should not be based   solely on procalcitonin levels.       Anion Gap       aPTT 23.2  Comment:  aPTT therapeutic range = 39-69 seconds     Baso #       Basophil%       Blood Culture, Routine       BUN, Bld       Calcium       Chloride       CO2       Creatinine       Differential Method       eGFR if        eGFR if non        Eos #       Eosinophil%       Glucose       Gran # (ANC)       Gran%       Hematocrit       Hemoglobin       Coumadin Monitoring INR 0.9  Comment:  Coumadin Therapy:  2.0 - 3.0 for INR for all indicators except mechanical heart valves  and antiphospholipid syndromes which should use 2.5 - 3.5.       Lymph #       Lymph%       Magnesium       MCH       MCHC       MCV       Mono #       Mono%       MPV       Phosphorus       Platelets       POCT Glucose       Potassium       Protime 10.0     RBC       RDW       Sodium       Troponin I 0.358  Comment:  The reference interval for Troponin I represents the 99th percentile    cutoff   for our facility and is consistent with 3rd generation assay   performance.        0.350  Comment:  The reference interval for Troponin I represents the 99th percentile   cutoff   for our facility and is consistent with 3rd generation assay   performance.       WBC             Significant Imaging: Echocardiogram:   2D echo with color flow doppler:   Results for orders placed or performed during the hospital encounter of 05/28/19   2D echo with color flow doppler   Result Value Ref Range    QEF 25 (A) 55 - 65    Mitral Valve Regurgitation MILD     Aortic Valve Stenosis MILD (A)     Tricuspid Valve Regurgitation MILD     Narrative    Date of Procedure: 05/28/2019        TEST DESCRIPTION   Technical Quality: This is a technically challenging study. There is poor endocardial definition. This study was performed in conjunction with a 3ml intravenous injection of Optison contrast agent.     Aorta: The aortic root is upper limit of normal, measuring 3.4 cm at sinotubular junction and 3.6 cm at Sinuses of Valsalva. The proximal ascending aorta is normal in size, measuring 3.2 cm across.     Left Atrium: The left atrial volume index is severely enlarged, measuring 59.54 cc/m2.     Left Ventricle: The left ventricle is normal in size, with an end-diastolic diameter of 5.0 cm, and an end-systolic diameter of 4.4 cm. Wall thickness is increased, with the septum measuring 2.2 cm and the posterior wall measuring 1.6 cm across. Relative   wall thickness was increased at 0.64, and the LV mass index was increased at 260.4 g/m2 consistent with concentric left ventricular hypertrophy. The following segments were moderately hypokinetic: mid inferolateral wall, basal inferolateral wall.  The following segments were severely hypokinetic: mid inferior wall.  Left ventricular systolic function appears severely depressed. Visually estimated ejection fraction is 25-30%. The LV Doppler derived stroke volume equals 115.0 ccs.      Diastolic indices: E wave velocity 1.4 m/s, E/A ratio 1.7,  msec., E/e' ratio(lat) 24. Diastolic function is indeterminate.     Right Atrium: The right atrium is normal in size, measuring 4.5 cm in length and 3.5 cm in width in the apical view.     Right Ventricle: The right ventricle is normal in size. Global right ventricular systolic function appears normal. Tricuspid annular plane systolic excursion (TAPSE) is 1.2 cm.     Aortic Valve:  The aortic valve is moderately sclerotic with mildly restricted leaflet mobility. The peak velocity obtained across the aortic valve is 2.55 m/s, which translates to a peak gradient of 26 mmHg. The mean gradient is 16 mmHg. Using a left   ventricular outflow tract diameter of 2.1 cm, a left ventricular outflow tract velocity time integral of 33 cm, and a peak instantaneous transvalvular velocity time integral of 71 cm, the calculated aortic valve area is 1.61 cm2(AVAi is 0.74 cm2/m2),   consistent with mild aortic stenosis.     Mitral Valve:  The mitral valve is mildly sclerotic. The pressure half time is 84 msec. The calculated mitral valve area is 2.62 cm2. There is mild mitral regurgitation. There is mitral annular calcification.     Tricuspid Valve:  The tricuspid valve is normal in structure. There is mild tricuspid regurgitation.     Pulmonary Valve:  The pulmonic valve is not well seen.     IVC: Right atrial pressure as assessed by IVC dynamics is normal at not visualized mmHg.     Intracavitary: There is no evidence of pericardial effusion, intracavity mass, thrombi, or vegetation.         CONCLUSIONS     1 - Severely depressed left ventricular systolic function (EF 25-30%).     2 - Indeterminate LV diastolic function.     3 - Normal right ventricular systolic function .     4 - Mild aortic stenosis, SERA = 1.61 cm2, AVAi = 0.74 cm2/m2, peak velocity = 2.55 m/s, mean gradient = 16 mmHg.     5 - Severe left atrial enlargement.     6 - Concentric hypertrophy.              This document has been electronically    SIGNED BY: Yandel Raman MD On: 05/28/2019 12:41    and X-Ray: CXR: X-Ray Chest 1 View (CXR): No results found for this visit on 08/03/19.    Assessment and Plan:     * Acute on Chronic hypoxic respiratory failure   Sec to CHF    Elevated troponin  Cont hep drip  Likely sec to demand ischemia  May need repeat ischemic workup if none recently but discuss palliative approach with pt and fam    Acute on chronic combined systolic and diastolic heart failure  Stage 4  Started DBT this AM  Cont IV diuresis  Cont BB, stop ACE due to SNEHA  Rec palliative care eval    CAD, multiple vessel  Cont asa statin bb    Chronic kidney disease, stage IV (severe)  Nephro consulted     Hypertension associated with diabetes  Cont meds, reduce afterload and titrate        VTE Risk Mitigation (From admission, onward)        Ordered     heparin 25,000 units in dextrose 5% (100 units/ml) IV bolus from bag - ADDITIONAL PRN BOLUS - 60 units/kg (max bolus 4000 units)  As needed (PRN)      08/04/19 0317     heparin 25,000 units in dextrose 5% (100 units/ml) IV bolus from bag - ADDITIONAL PRN BOLUS - 30 units/kg (max bolus 4000 units)  As needed (PRN)      08/04/19 0317     heparin 25,000 units in dextrose 5% 250 mL (100 units/mL) infusion LOW INTENSITY nomogram - [facility]  Continuous      08/03/19 1455     IP VTE HIGH RISK PATIENT  Once      08/03/19 1403          Thank you for your consult. I will follow-up with patient. Please contact us if you have any additional questions.    Loc Brown Md, MD  Cardiology   [facility]

## 2019-08-04 NOTE — ASSESSMENT & PLAN NOTE
Patient has h/o CKD 4 with baseline creatinine of about 2.5 to 2.7. Creatinine has now increased to 3.2 on 8/4/19. SNEHA likely multifactorial and related to infection (pneumonia) and mild ADHF (pulmonary edema on CXR). Will treat pneumonia and increase Lasix dose to 80 mg IV tid.  No need for dialysi at present. Urinalysis shows chronic proteinuria, no hematuria or increased WBC. Will get renal US to rule out obstruction. Avoid NSAIDs, ARB, ACE-I.

## 2019-08-04 NOTE — HPI
78 year old male with AS, CKD 4, CHF, CAD, DM2, GERD, h/o CVA [place] [facility] [place]. Wor-up revealed ADHF, pneumonia, elevated troponin and SNEHA on CKD 4.     Nephrology was consulted to help with patient's renal care while he is [place] [facility]. I saw and examined patient in his hospital room. Patient reports that he woke up with fever on of admission. He also reports generalized weakness and chronic SOB that is exacerbated by ambulation/exertion. He denies any chest pain, abdominal pain, nausea/vomiting, diarrhea, LE edema, dysuria. He also denies NSAID use, has been using tylenol only. He uses Lasix at home (1 pill per day).     Chart review revealed that patient's baseline creatinine is about 2.5 to 2.7. Patient is currently not seen by nephrology.

## 2019-08-04 NOTE — ASSESSMENT & PLAN NOTE
IV diuresis and  Fluid/Sodium restriction.    Daily weights and strict I&O.    Cardiology following   Continue ASA, BB, Statin    2d echo    Troponin 0.242  Serial troponin's    No acute EKG changes  8/4/19-  Cardiology consult obtained .  Pt is started on Dobutamine drip.  Continue IV diuresis

## 2019-08-04 NOTE — HOSPITAL COURSE
8/4 comfortable on supplemental oxygen  8/5 seen and examined.  In bed.  O2 sat 96% on 10 L O2.  Afebrile.  CT scan, chest x-ray and echo reviewed.  On heparin drip and dobutamine drip.  Right/6 seen and examined.  O2 sat on 5 L is 92%.  On dobutamine drip.  Heparin drip discontinued.  Wife at bedside.

## 2019-08-04 NOTE — CONSULTS
[facility] -   Nephrology  Consult Note          Patient Name: Carlitos Ellington  MRN: [id number]  Admission Date: 8/3/2019  Hospital Length of Stay: 0 days  Attending Provider: Kylah Goldman MD   Primary Care Physician: Raza Uribe MD  Principal Problem:Hypoxemia    Consults  Subjective:     HPI: 78 year old male with AS, CKD 4, CHF, CAD, DM2, GERD, h/o CVA presents to [facility] with weakness. Wor-up revealed ADHF, pneumonia, elevated troponin and SNEHA on CKD 4.     Nephrology was consulted to help with patient's renal care while he is admitted at [facility]. I saw and examined patient in his hospital room. Patient reports that he woke up with fever on of admission. He also reports generalized weakness and chronic SOB that is exacerbated by ambulation/exertion. He denies any chest pain, abdominal pain, nausea/vomiting, diarrhea, LE edema, dysuria. He also denies NSAID use, has been using tylenol only. He uses Lasix at home (1 pill per day).     Chart review revealed that patient's baseline creatinine is about 2.5 to 2.7. Patient is currently not seen by nephrology.     Past Medical History:   Diagnosis Date    Aortic stenosis, mild     [facility]otid [facility]     [facility]    Chronic kidney disease, stage IV (severe)     Congestive heart failure, NYHA class 3, chronic, systolic     Coronary artery disease     Diabetes mellitus with no complication     GERD (gastroesophageal reflux disease)     History of CVA (cerebrovascular accident) 11/2018    MRI  reveals right parietal lobe infarct    Hx of CABG 5/28/2019    Hyperlipidemia associated with type 2 diabetes mellitus     Hypertension associated with diabetes     Mild AI (aortic insufficiency)     Moderate mitral regurgitation     Type II diabetes mellitus with renal manifestations        Past Surgical History:   Procedure Laterality Date    CORONARY ARTERY BYPASS GRAFT      CORONARY STENT PLACEMENT      KIDNEY STONE  SURGERY         Review of patient's allergies indicates:   Allergen Reactions    Influenza virus vaccines Other (See Comments)     Red streaks up arm    Isosorbide (solution)      Headache    Penicillins Swelling     Current Facility-Administered Medications   Medication Frequency    amLODIPine tablet 10 mg Daily    aspirin EC tablet 81 mg Daily    azithromycin 500 mg in dextrose 5 % 250 mL IVPB (ready to mix system) Q24H    cefTRIAXone (ROCEPHIN) 1 g in dextrose 5 % 50 mL IVPB Q24H    clopidogrel tablet 75 mg Daily    dextrose 50% injection 12.5 g PRN    dextrose 50% injection 25 g PRN    DOBUtamine 1000 mg in D5W 250 mL infusion (premix non-titrating) Continuous    furosemide injection 40 mg BID    glucagon (human recombinant) injection 1 mg PRN    glucose chewable tablet 16 g PRN    glucose chewable tablet 24 g PRN    heparin 25,000 units in dextrose 5% (100 units/ml) IV bolus from bag - ADDITIONAL PRN BOLUS - 30 units/kg (max bolus 4000 units) PRN    heparin 25,000 units in dextrose 5% (100 units/ml) IV bolus from bag - ADDITIONAL PRN BOLUS - 60 units/kg (max bolus 4000 units) PRN    heparin 25,000 units in dextrose 5% 250 mL (100 units/mL) infusion LOW INTENSITY nomogram - [facility] Continuous    hydrALAZINE tablet 100 mg BID    insulin aspart U-100 pen 1-10 Units QID (AC + HS) PRN    isosorbide dinitrate tablet 20 mg TID    metoprolol succinate (TOPROL-XL) 24 hr tablet 50 mg Daily    pantoprazole EC tablet 40 mg Daily    prazosin capsule 1 mg BID    rosuvastatin tablet 40 mg QHS    sertraline tablet 50 mg Daily     Family History     None        Tobacco Use    Smoking status: Former Smoker    Smokeless tobacco: Current User     Types: Snuff   Substance and Sexual Activity    Alcohol use: No    Drug use: No    Sexual activity: Not on file     Review of Systems   Constitutional: Positive for fatigue. Negative for fever.   HENT: Negative for congestion.    Eyes: Negative for visual  disturbance.   Respiratory: Positive for shortness of breath. Negative for cough and wheezing.    Cardiovascular: Negative for chest pain and palpitations.   Gastrointestinal: Negative for abdominal pain, diarrhea, nausea and vomiting.   Genitourinary: Negative for difficulty urinating and dysuria.   Musculoskeletal: Negative for joint swelling.   Skin: Negative for rash.   Neurological: Positive for weakness. Negative for headaches.     Objective:     Vital Signs (Most Recent):  Temp: 98 °F (36.7 °C) (08/04/19 0844)  Pulse: 83 (08/04/19 0844)  Resp: 18 (08/04/19 0844)  BP: 135/63 (08/04/19 0844)  SpO2: (!) 94 % (08/04/19 0844)  O2 Device (Oxygen Therapy): High Flow nasal Cannula (08/04/19 0841) Vital Signs (24h Range):  Temp:  [97.5 °F (36.4 °C)-99.2 °F (37.3 °C)] 98 °F (36.7 °C)  Pulse:  [69-93] 83  Resp:  [16-24] 18  SpO2:  [86 %-95 %] 94 %  BP: (113-145)/(53-92) 135/63     Weight: 94.7 kg (208 lb 12.4 oz) (08/04/19 0641)  Body mass index is 30.83 kg/m².  Body surface area is 2.15 meters squared.    I/O last 3 completed shifts:  In: 430.3 [P.O.:50; I.V.:130.3; IV Piggyback:250]  Out: -     Physical Exam   Constitutional: He is oriented to person, place, and time. He appears well-developed. He is cooperative.   HENT:   Head: Normocephalic.   Nose: No rhinorrhea.   Mouth/Throat: Mucous membranes are normal. No oropharyngeal exudate.   Eyes: Pupils are equal, round, and reactive to light.   Neck: No thyroid mass present.   Cardiovascular: Normal rate, regular rhythm, S1 normal, S2 normal and intact distal pulses.   Pulmonary/Chest: Effort normal. No respiratory distress. He has no wheezes.   Abdominal: Soft. Bowel sounds are normal. He exhibits no distension. There is no tenderness. No hernia.   Lymphadenopathy:     He has no cervical adenopathy.   Neurological: He is alert and oriented to person, place, and time.   Skin: Skin is warm and dry.       Significant Labs:  Lab Results   Component Value Date    CREATININE  3.2 (H) 08/04/2019    BUN 41 (H) 08/04/2019     08/04/2019    K 3.9 08/04/2019     08/04/2019    CO2 26 08/04/2019     Lab Results   Component Value Date    CALCIUM 8.7 08/04/2019    PHOS 3.2 08/03/2019     Lab Results   Component Value Date    ALBUMIN 2.7 (L) 08/03/2019     Lab Results   Component Value Date    WBC 8.79 08/04/2019    HGB 9.5 (L) 08/04/2019    HCT 29.3 (L) 08/04/2019    MCV 90 08/04/2019     08/04/2019     Recent Labs   Lab 08/03/19  1636   MG 1.8     Urinalysis  Recent Labs   Lab 08/03/19  1305   COLORU Yellow   SPECGRAV 1.025   PHUR 6.0   PROTEINUA 2+*   BACTERIA Occasional   NITRITE Negative   LEUKOCYTESUR Negative   UROBILINOGEN Negative   HYALINECASTS 0   1 RBC  1 WBC      Significant Imaging:  Imaging Results          X-Ray Chest AP Portable (Final result)  Result time 08/03/19 11:57:16    Final result by Morales Walsh MD (08/03/19 11:57:16)                 Impression:      Cardiomegaly with pulmonary edema.  Bilateral pleural fluid collections left greater than right with atelectasis or consolidation left lower lobe.      Electronically signed by: Morales Walsh  Date:    08/03/2019  Time:    11:57             Narrative:    EXAMINATION:  XR CHEST AP PORTABLE    CLINICAL HISTORY:  fatigue;    COMPARISON:  05/28/2019    FINDINGS:  Cardiomegaly with by lateral pulmonary congestion.  There is some consolidation atelectatic change in the left lung base with bilateral pleural fluid collections.                                  Assessment/Plan:     Pneumonia left lower lobe  Continue antibiotics.     Elevated troponin  As per Cardiology. On heparin drip.     Acute on chronic combined systolic and diastolic heart failure  Continue Lasix diuresis.     Chronic kidney disease, stage IV (severe)  Patient has h/o CKD 4 with baseline creatinine of about 2.5 to 2.7. Creatinine has now increased to 3.2 on 8/4/19. SNEHA likely multifactorial and related to infection (pneumonia) and mild ADHF  (pulmonary edema on CXR). Will treat pneumonia and increase Lasix dose to 80 mg IV tid.  No need for dialysi at present. Urinalysis shows chronic proteinuria, no hematuria or increased WBC. Will get renal US to rule out obstruction. Avoid NSAIDs, ARB, ACE-I.         Thank you for your consult. I will follow-up with patient. Please contact us if you have any additional questions.    Raj Acharya MD   Nephrology  [facility]

## 2019-08-04 NOTE — SUBJECTIVE & OBJECTIVE
Interval History:   On high flow O2 via NC 15L/min  Cardiology started on Heparin and Dobutamine drip   On Rocephin and Azithromycin for antibiotic coverage   Add Solumedrol 80 mg IV x 3 doses   Continue bronchodilators and inhalational corticosteroid     Review of Systems   Constitutional: Positive for activity change, appetite change, chills, fatigue and fever.   HENT: Negative for sore throat.    Eyes: Negative for visual disturbance.   Respiratory: Positive for shortness of breath. Negative for cough and chest tightness.    Cardiovascular: Positive for leg swelling. Negative for chest pain and palpitations.   Gastrointestinal: Negative for abdominal distention, abdominal pain, constipation, diarrhea, nausea and vomiting.   Endocrine: Negative for polyuria.   Genitourinary: Negative for decreased urine volume, dysuria, flank pain, frequency and hematuria.   Musculoskeletal: Negative for back pain and gait problem.   Skin: Negative for rash.   Neurological: Negative for syncope, speech difficulty, weakness, light-headedness and headaches.   Psychiatric/Behavioral: Negative for confusion, hallucinations and sleep disturbance.     Objective:     Vital Signs (Most Recent):  Temp: 96.2 °F (35.7 °C) (08/04/19 1126)  Pulse: 81 (08/04/19 1320)  Resp: 18 (08/04/19 1320)  BP: (!) 151/66 (08/04/19 1126)  SpO2: (!) 92 % (08/04/19 1320) Vital Signs (24h Range):  Temp:  [96.2 °F (35.7 °C)-98.2 °F (36.8 °C)] 96.2 °F (35.7 °C)  Pulse:  [61-93] 81  Resp:  [16-20] 18  SpO2:  [86 %-96 %] 92 %  BP: (113-151)/(53-92) 151/66     Weight: 94.7 kg (208 lb 12.4 oz)  Body mass index is 30.83 kg/m².    Intake/Output Summary (Last 24 hours) at 8/4/2019 1322  Last data filed at 8/4/2019 1042  Gross per 24 hour   Intake 590.46 ml   Output 300 ml   Net 290.46 ml      Physical Exam   Constitutional: He is oriented to person, place, and time. No distress.   HENT:   Head: Normocephalic and atraumatic.   Eyes: EOM are normal.   Neck: Normal range  of motion. Neck supple.   Cardiovascular: Normal rate, regular rhythm and normal heart sounds.   Pulmonary/Chest: No respiratory distress. He has rales.   Coarse breath  Sounds    Abdominal: Soft. Bowel sounds are normal. There is no guarding.   Musculoskeletal: He exhibits no edema.   Neurological: He is alert and oriented to person, place, and time.   Skin: Skin is warm and dry.       Significant Labs:   CBC:   Recent Labs   Lab 08/03/19  1208 08/04/19  0456   WBC 8.82 8.79   HGB 9.8* 9.5*   HCT 29.9* 29.3*    222     CMP:   Recent Labs   Lab 08/03/19  1208 08/04/19  0456    138   K 3.8 3.9    104   CO2 23 26   * 155*   BUN 35* 41*   CREATININE 2.8* 3.2*   CALCIUM 8.0* 8.7   PROT 5.6*  --    ALBUMIN 2.7*  --    BILITOT 0.4  --    ALKPHOS 49*  --    AST 14  --    ALT 18  --    ANIONGAP 7* 8   EGFRNONAA 21* 18*       Significant Imaging:  CT Chest Without Contrast [[id number]] Resulted: 08/04/19 0936   Order Status: Completed Updated: 08/04/19 0938   Narrative:     EXAMINATION:  CT CHEST WITHOUT CONTRAST    CLINICAL HISTORY:  Shortness of breath;SOB, pulmonary edema suspected;persistent hypoxia;    COMPARISON:  Chest x-ray 08/03/2019    FINDINGS:  Centrilobular emphysematous changes noted with scattered areas of increased attenuation in both lung fields possibly representing edema.  Atelectatic change in both lung bases right greater than left.  8 mm pulmonary nodule in the lateral right upper lobe.  Small bilateral pleural fluid collections noted.  Loculated component suspected along the left lateral chest wall.  The heart is enlarged.  There are aortic and coronary artery calcifications.  No mediastinal adenopathy is identified.  Sternotomy wires.  No acute osseous abnormality is identified.   Impression:       Emphysematous changes with questionable pulmonary congestion.  Bibasilar atelectatic change with bilateral pleural fluid collections.  Suspect small loculated component of pleural  fluid along the left lateral chest wall.  8 mm pulmonary nodule right upper lobe.  Follow-up recommended per Fleischner society guidelines.    All CT scans at this facility use dose modulation, iterative reconstruction, and/or weight based dosing when appropriate to reduce radiation dose to as low as reasonably achievable.

## 2019-08-04 NOTE — PLAN OF CARE
Problem: Adult Inpatient Plan of Care  Goal: Patient-Specific Goal (Individualization)  Outcome: Ongoing (interventions implemented as appropriate)  Plan of care reviewed with patient    Comments: Patient A&Ox4.   15L high yaneth. Nebs ordered. IV steroids started.   NSR on tele  Diabetic diet. ACHS accuchecks.  Urinal. Brief on. Up with 1 assist.  Barrier cream on cocyx.   Heparin gtt infusing. Dobutamine gtt infusing. IV antibiotics given. New IV started this shift.  No complaints of pain.  All questions answered.  Will continue to monitor..    Sada Andrea RN

## 2019-08-04 NOTE — CONSULTS
[facility] -   Pulmonology  Consult Note    Patient Name: Carlitos Ellington  MRN: [id number]  Admission Date: 8/3/2019  Hospital Length of Stay: 0 days  Code Status: DNR  Attending Physician: Kylah Goldman MD  Primary Care Provider: Raza Uribe MD   Principal Problem: Hypoxemia      Subjective:     HPI:  78 y.o.oxygen dependant  male patient with a PMHx of CABG, CVA, CAD, CHF, DM, GERD, HTN, HLD, Renal Insufficientcy and mild AI  presents with 1- 2 day history of shortness of breath and chest congestion. No sputum production , no worsening edema. C/o fatigue.He also reports generalized weakness and chronic SOB that is exacerbated by ambulation/exertion and activities of daily living.   Significant past history of Bilateral carotid artery stenosis, Hypertension associated with diabetes, Atherosclerosis of native coronary artery of native heart without angina pectoris,Ischemic cardiomyopathy, Hyperlipidemia associated with type 2 diabetes mellitus,Cerebrovascular accident (CVA) due to embolism of left middle cerebral artery.  Patient denies significant past pulmonary problems but has been on oxygen for some time. Former smoker - stopped years ago 20 pack years, presently uses snuff    Past Medical History:   Diagnosis Date    Aortic stenosis, mild     [facility]     [facility]    Chronic kidney disease, stage IV (severe)     Congestive heart failure, NYHA class 3, chronic, systolic     Coronary artery disease     Diabetes mellitus with no complication     GERD (gastroesophageal reflux disease)     History of CVA (cerebrovascular accident) 11/2018    MRI  reveals right parietal lobe infarct    Hx of CABG 5/28/2019    Hyperlipidemia associated with type 2 diabetes mellitus     Hypertension associated with diabetes     Mild AI (aortic insufficiency)     Moderate mitral regurgitation     Type II diabetes mellitus with renal manifestations        Past  Surgical History:   Procedure Laterality Date    CORONARY ARTERY BYPASS GRAFT      CORONARY STENT PLACEMENT      KIDNEY STONE SURGERY         Review of patient's allergies indicates:   Allergen Reactions    Influenza virus vaccines Other (See Comments)     Red streaks up arm    Isosorbide (solution)      Headache    Penicillins Swelling       Family History     None        Tobacco Use    Smoking status: Former Smoker     Packs/day: 1.00     Years: 20.00     Pack years: 20.00     Types: Cigarettes    Smokeless tobacco: Current User     Types: Snuff   Substance and Sexual Activity    Alcohol use: No    Drug use: No    Sexual activity: Not on file         Review of Systems   Constitutional: Positive for diaphoresis and fatigue.   HENT: Positive for congestion.    Eyes: Negative.    Respiratory: Positive for shortness of breath.    Cardiovascular: Positive for leg swelling.   Gastrointestinal: Positive for constipation.   Endocrine: Positive for cold intolerance.   Genitourinary: Negative.    Musculoskeletal: Positive for arthralgias.   Neurological: Positive for dizziness and weakness.   Hematological: Negative.    Psychiatric/Behavioral: Negative.      Objective:     Vital Signs (Most Recent):  Temp: 96.2 °F (35.7 °C) (08/04/19 1126)  Pulse: 81 (08/04/19 1320)  Resp: 18 (08/04/19 1320)  BP: (!) 151/66 (08/04/19 1126)  SpO2: (!) 92 % (08/04/19 1320) Vital Signs (24h Range):  Temp:  [96.2 °F (35.7 °C)-98.2 °F (36.8 °C)] 96.2 °F (35.7 °C)  Pulse:  [61-93] 81  Resp:  [16-20] 18  SpO2:  [86 %-96 %] 92 %  BP: (113-151)/(53-66) 151/66     Weight: 94.7 kg (208 lb 12.4 oz)  Body mass index is 30.83 kg/m².      Intake/Output Summary (Last 24 hours) at 8/4/2019 1447  Last data filed at 8/4/2019 1300  Gross per 24 hour   Intake 830.46 ml   Output 500 ml   Net 330.46 ml       Physical Exam   Constitutional: He is oriented to person, place, and time.   Chronically ill appearing     HENT:   Head: Normocephalic and  atraumatic.   Eyes: Pupils are equal, round, and reactive to light. Conjunctivae are normal.   Neck: Neck supple. JVD present. No tracheal deviation present. No thyromegaly present.   Cardiovascular: Normal rate. A regularly irregular rhythm present. PMI is displaced.   Murmur heard.   Systolic murmur is present with a grade of 2/6.  Pulmonary/Chest: Effort normal. He has decreased breath sounds. He has rales in the right lower field and the left lower field.   Abdominal: Soft.   Musculoskeletal: Normal range of motion. He exhibits edema.   Lymphadenopathy:     He has no cervical adenopathy.   Neurological: He is alert and oriented to person, place, and time.   Skin: Skin is warm and dry.   Nursing note and vitals reviewed.      Vents:  Oxygen Concentration (%): 50 (08/03/19 2014)    Lines/Drains/Airways     Peripheral Intravenous Line                 Peripheral IV - Single Lumen 08/03/19 20 G Right Wrist 1 day         Peripheral IV - Single Lumen 08/04/19 1130 Anterior;Distal;Left Forearm less than 1 day                Significant Labs:    CBC/Anemia Profile:  Recent Labs   Lab 08/03/19  1208 08/04/19  0456   WBC 8.82 8.79   HGB 9.8* 9.5*   HCT 29.9* 29.3*    222   MCV 90 90   RDW 14.9* 15.1*        Chemistries:  Recent Labs   Lab 08/03/19  1208 08/03/19  1636 08/04/19  0456     --  138   K 3.8  --  3.9     --  104   CO2 23  --  26   BUN 35*  --  41*   CREATININE 2.8*  --  3.2*   CALCIUM 8.0*  --  8.7   ALBUMIN 2.7*  --   --    PROT 5.6*  --   --    BILITOT 0.4  --   --    ALKPHOS 49*  --   --    ALT 18  --   --    AST 14  --   --    MG  --  1.8  --    PHOS  --  3.2  --        ABGs:   Recent Labs   Lab 08/03/19  1146   PH 7.408   PCO2 36.0   HCO3 22.7*   POCSATURATED 89*   BE -2     BMP:   Recent Labs   Lab 08/03/19  1636 08/04/19  0456   GLU  --  155*   NA  --  138   K  --  3.9   CL  --  104   CO2  --  26   BUN  --  41*   CREATININE  --  3.2*   CALCIUM  --  8.7   MG 1.8  --      CMP:   Recent  Labs   Lab 19  1208 19  0456    138   K 3.8 3.9    104   CO2 23 26   * 155*   BUN 35* 41*   CREATININE 2.8* 3.2*   CALCIUM 8.0* 8.7   PROT 5.6*  --    ALBUMIN 2.7*  --    BILITOT 0.4  --    ALKPHOS 49*  --    AST 14  --    ALT 18  --    ANIONGAP 7* 8   EGFRNONAA 21* 18*     Troponin:   Recent Labs   Lab 19  1208 19  1635 19  2203   TROPONINI 0.242* 0.358*  0.350* 0.321*     All pertinent labs within the past 24 hours have been reviewed.    Significant Imaging:   CT: I have reviewed all pertinent results/findings within the past 24 hours and my personal findings are:  Radiology Result     Radiology Result      Name:   :  Patient MRN:   Carlitos Ellington 1940 [id number]   Account Number: Room & Bed Accession Number:   [id number] [facility] [id number] A [id number]   Authorizing Physician: Patient Class: Diagnosis:   Kylah Goldman OP- Observation     Procedure:  Exam Date: Reason for Exam:   CT Chest Without Contrast 2019 Shortness of breath SOB, pulmonary edema suspected persistent hypoxia             RESULTS:     EXAMINATION:  CT CHEST WITHOUT CONTRAST     CLINICAL HISTORY:  Shortness of breath;SOB, pulmonary edema suspected;persistent hypoxia;     COMPARISON:  Chest x-ray 2019     FINDINGS:  Centrilobular emphysematous changes noted with scattered areas of   increased attenuation in both lung fields possibly representing edema.    Atelectatic change in both lung bases right greater than left.  8 mm   pulmonary nodule in the lateral right upper lobe.  Small bilateral pleural   fluid collections noted.  Loculated component suspected along the left   lateral chest wall.  The heart is enlarged.  There are aortic and coronary   artery calcifications.  No mediastinal adenopathy is identified.    Sternotomy wires.  No acute osseous abnormality is identified.     Impression:     Emphysematous changes with questionable pulmonary congestion.  Bibasilar    atelectatic change with bilateral pleural fluid collections.  Suspect   small loculated component of pleural fluid along the left lateral chest   wall.  8 mm pulmonary nodule right upper lobe.  Follow-up recommended per   Fleischner society guidelines.     All CT scans at this facility use dose modulation, iterative   reconstruction, and/or weight based dosing when appropriate to reduce   radiation dose to as low as reasonably achievable.        Electronically signed by:     Morales Walsh  Date:                                    08/04/2019  Time:                                   09:36                    Signed By:  Morales Walsh MD on 8/4/2019  9:36 AM            ABG  Recent Labs   Lab 08/03/19  1146   PH 7.408   PO2 55*   PCO2 36.0   HCO3 22.7*   BE -2     Assessment/Plan:     * Acute on Chronic hypoxic respiratory failure   8/4 supplemental oxygen    Panlobular emphysema  8/4 Start jet nebs    Pleural effusion, bilateral  8/4 Very small effusions, consider fluid restriction cautious diuresis           Thank you for your consult. I will follow-up with patient. Please contact us if you have any additional questions.     Catracho Harris MD  Pulmonology  [facility]

## 2019-08-04 NOTE — HPI
Carlitos Ellington is a 78 y.o. male patient with a PMHx of CABG, CVA, CAD, CHF, DM, GERD, HTN, HLD, and mild AI who presents to the Emergency Department for evaluation of generalized weakness which onset suddenly when he woke up in morning. Info obtained from chart.  Pt denies SOB at this time, but wife states he seems SOB. Symptoms are constant and moderate in severity. No exacerbating factors reported. Associated sxs include fatigue and fever. Pt is on continuous oxygen at 2L NC. Patient denies any CP, abd pain, hematuria, dysuria, blood in stool, difficulty urinating, frequency, n/v/d, leg swelling, palpitations, and all other sxs at this time.  Per EMS pt's O2 sats dropped and was placed on 4L NC. Wife gave pt 2 tylenol x1 hour PTA.  ED work-up resulted h/h of 9.8/29.9, BUN 35, Creatinine 2.8, BNP 1711, troponin 0.242, ABG: PO2 55, HCO3 22.7 on  2L NC. CXR notes pulmonary edema.  Bilateral pleural fluid collections left greater than right with atelectasis or consolidation left lower lobe. Cardiology consult for CHF.    Started pt on DBT 2.5 for diuresis, renal function is worse, trop rising to 0.358, on heparin drip. Need records from primary cardiologist.  Discussed palliation also as he is on DBT now.

## 2019-08-04 NOTE — PLAN OF CARE
Problem: Adult Inpatient Plan of Care  Goal: Plan of Care Review  Outcome: Ongoing (interventions implemented as appropriate)  POC discussed w/patient, verbalized understanding.    NSR on monitor. VSS. AAO X4 .   Assistance X1 .   IV intact. Medications administered as prescribed.    Weight shift encouraged.   Fall precautions in place, call bell in reach, bed in low and locked position.   Patient remains free from falls/injuries.   Patient denies needs at this time.     Patient started on heparin drip for elevated troponin. Patient receiving Abx for possible PNA.     Will continue to monitor.

## 2019-08-04 NOTE — HOSPITAL COURSE
8/4/19 -  Pt is seen at bedside . Expressed that he would like to see a Pulmonologist while in the hospital . He is O2 dependent at home at 2L/min. Currently requiring high flow O2 via NC. Denies chest pain but report some SOB and subjective fever . Tmax 99.2 since admission.     CT chest done this morning . Report is reviewed .Positive emphysematous changes are suggested . Pt is a former smoker.    Cardiology and Nephrology consults were obtained     8/5  Patient with continued decline. Pulmonary consulted - Emphysema, Cardiology Consulted - On dobutamine GTT - NSTEMI, Nephrology on Board. Discussed multiple comorbidities and the apprpriatness of Palliative Care. Patient understood and has accepted Hospice Referral. CM at bedside. POA selecting [facility]. Awaiting consult. Patient denies CP + SOB. Plan for Inpatient placement with transition to home as appropriate.   8/6  Patient evaluated by Hospice and has been accepted for Home Hospice per family direction. Patient critical and appropriate for comfort care - seen and examined today prior to his discharge to home.

## 2019-08-04 NOTE — ASSESSMENT & PLAN NOTE
Stage 4  Started DBT this AM  Cont IV diuresis  Cont BB, stop ACE due to SNEHA  Rec palliative care eval

## 2019-08-04 NOTE — SUBJECTIVE & OBJECTIVE
Past Medical History:   Diagnosis Date    Aortic stenosis, mild     [facility]otid [facility]     [facility]    Chronic kidney disease, stage IV (severe)     Congestive heart failure, NYHA class 3, chronic, systolic     Coronary artery disease     Diabetes mellitus with no complication     GERD (gastroesophageal reflux disease)     History of CVA (cerebrovascular accident) 11/2018    MRI  reveals right parietal lobe infarct    Hx of CABG 5/28/2019    Hyperlipidemia associated with type 2 diabetes mellitus     Hypertension associated with diabetes     Mild AI (aortic insufficiency)     Moderate mitral regurgitation     Type II diabetes mellitus with renal manifestations        Past Surgical History:   Procedure Laterality Date    CORONARY ARTERY BYPASS GRAFT      CORONARY STENT PLACEMENT      KIDNEY STONE SURGERY         Review of patient's allergies indicates:   Allergen Reactions    Influenza virus vaccines Other (See Comments)     Red streaks up arm    Isosorbide (solution)      Headache    Penicillins Swelling     Current Facility-Administered Medications   Medication Frequency    amLODIPine tablet 10 mg Daily    aspirin EC tablet 81 mg Daily    azithromycin 500 mg in dextrose 5 % 250 mL IVPB (ready to mix system) Q24H    cefTRIAXone (ROCEPHIN) 1 g in dextrose 5 % 50 mL IVPB Q24H    clopidogrel tablet 75 mg Daily    dextrose 50% injection 12.5 g PRN    dextrose 50% injection 25 g PRN    DOBUtamine 1000 mg in D5W 250 mL infusion (premix non-titrating) Continuous    furosemide injection 40 mg BID    glucagon (human recombinant) injection 1 mg PRN    glucose chewable tablet 16 g PRN    glucose chewable tablet 24 g PRN    heparin 25,000 units in dextrose 5% (100 units/ml) IV bolus from bag - ADDITIONAL PRN BOLUS - 30 units/kg (max bolus 4000 units) PRN    heparin 25,000 units in dextrose 5% (100 units/ml) IV bolus from bag - ADDITIONAL PRN BOLUS - 60 units/kg  (max bolus 4000 units) PRN    heparin 25,000 units in dextrose 5% 250 mL (100 units/mL) infusion LOW INTENSITY nomogram - [facility] Continuous    hydrALAZINE tablet 100 mg BID    insulin aspart U-100 pen 1-10 Units QID (AC + HS) PRN    isosorbide dinitrate tablet 20 mg TID    metoprolol succinate (TOPROL-XL) 24 hr tablet 50 mg Daily    pantoprazole EC tablet 40 mg Daily    prazosin capsule 1 mg BID    rosuvastatin tablet 40 mg QHS    sertraline tablet 50 mg Daily     Family History     None        Tobacco Use    Smoking status: Former Smoker    Smokeless tobacco: Current User     Types: Snuff   Substance and Sexual Activity    Alcohol use: No    Drug use: No    Sexual activity: Not on file     Review of Systems   Constitutional: Positive for fatigue. Negative for fever.   HENT: Negative for congestion.    Eyes: Negative for visual disturbance.   Respiratory: Positive for shortness of breath. Negative for cough and wheezing.    Cardiovascular: Negative for chest pain and palpitations.   Gastrointestinal: Negative for abdominal pain, diarrhea, nausea and vomiting.   Genitourinary: Negative for difficulty urinating and dysuria.   Musculoskeletal: Negative for joint swelling.   Skin: Negative for rash.   Neurological: Positive for weakness. Negative for headaches.     Objective:     Vital Signs (Most Recent):  Temp: 98 °F (36.7 °C) (08/04/19 0844)  Pulse: 83 (08/04/19 0844)  Resp: 18 (08/04/19 0844)  BP: 135/63 (08/04/19 0844)  SpO2: (!) 94 % (08/04/19 0844)  O2 Device (Oxygen Therapy): High Flow nasal Cannula (08/04/19 0841) Vital Signs (24h Range):  Temp:  [97.5 °F (36.4 °C)-99.2 °F (37.3 °C)] 98 °F (36.7 °C)  Pulse:  [69-93] 83  Resp:  [16-24] 18  SpO2:  [86 %-95 %] 94 %  BP: (113-145)/(53-92) 135/63     Weight: 94.7 kg (208 lb 12.4 oz) (08/04/19 0641)  Body mass index is 30.83 kg/m².  Body surface area is 2.15 meters squared.    I/O last 3 completed shifts:  In: 430.3 [P.O.:50; I.V.:130.3; IV  Piggyback:250]  Out: -     Physical Exam   Constitutional: He is oriented to person, place, and time. He appears well-developed. He is cooperative.   HENT:   Head: Normocephalic.   Nose: No rhinorrhea.   Mouth/Throat: Mucous membranes are normal. No oropharyngeal exudate.   Eyes: Pupils are equal, round, and reactive to light.   Neck: No thyroid mass present.   Cardiovascular: Normal rate, regular rhythm, S1 normal, S2 normal and intact distal pulses.   Pulmonary/Chest: Effort normal. No respiratory distress. He has no wheezes.   Abdominal: Soft. Bowel sounds are normal. He exhibits no distension. There is no tenderness. No hernia.   Lymphadenopathy:     He has no cervical adenopathy.   Neurological: He is alert and oriented to person, place, and time.   Skin: Skin is warm and dry.       Significant Labs:  Lab Results   Component Value Date    CREATININE 3.2 (H) 08/04/2019    BUN 41 (H) 08/04/2019     08/04/2019    K 3.9 08/04/2019     08/04/2019    CO2 26 08/04/2019     Lab Results   Component Value Date    CALCIUM 8.7 08/04/2019    PHOS 3.2 08/03/2019     Lab Results   Component Value Date    ALBUMIN 2.7 (L) 08/03/2019     Lab Results   Component Value Date    WBC 8.79 08/04/2019    HGB 9.5 (L) 08/04/2019    HCT 29.3 (L) 08/04/2019    MCV 90 08/04/2019     08/04/2019     Recent Labs   Lab 08/03/19  1636   MG 1.8     Urinalysis  Recent Labs   Lab 08/03/19  1305   COLORU Yellow   SPECGRAV 1.025   PHUR 6.0   PROTEINUA 2+*   BACTERIA Occasional   NITRITE Negative   LEUKOCYTESUR Negative   UROBILINOGEN Negative   HYALINECASTS 0   1 RBC  1 WBC      Significant Imaging:  Imaging Results          X-Ray Chest AP Portable (Final result)  Result time 08/03/19 11:57:16    Final result by Morales Walsh MD (08/03/19 11:57:16)                 Impression:      Cardiomegaly with pulmonary edema.  Bilateral pleural fluid collections left greater than right with atelectasis or consolidation left lower  lobe.      Electronically signed by: Morales Walsh  Date:    08/03/2019  Time:    11:57             Narrative:    EXAMINATION:  XR CHEST AP PORTABLE    CLINICAL HISTORY:  fatigue;    COMPARISON:  05/28/2019    FINDINGS:  Cardiomegaly with by lateral pulmonary congestion.  There is some consolidation atelectatic change in the left lung base with bilateral pleural fluid collections.

## 2019-08-04 NOTE — PLAN OF CARE
Problem: Adult Inpatient Plan of Care  Goal: Plan of Care Review  Outcome: Ongoing (interventions implemented as appropriate)  POC reviewed, verbalized understanding. Pt remained free from falls, fall precautions in place. Pt is SR on monitor, 80s-90s. VSS. No other c/o at this time. PIV intact, heparin gtt infusing. High flow NC at 15L. Call bell and personal belongings within reach. Hourly rounding complete. Reminded to call for assistance. Will continue to monitor.

## 2019-08-04 NOTE — HPI
78 y.o.oxygen dependant  male patient with a PMHx of CABG, CVA, CAD, CHF, DM, GERD, HTN, HLD, Renal Insufficientcy and mild AI  presents with 1- 2 day history of shortness of breath and chest congestion. No sputum production , no worsening edema. C/o fatigue.He also reports generalized weakness and chronic SOB that is exacerbated by ambulation/exertion and activities of daily living.   Significant past history of Bilateral carotid artery stenosis, Hypertension associated with diabetes, Atherosclerosis of native coronary artery of native heart without angina pectoris,Ischemic cardiomyopathy, Hyperlipidemia associated with type 2 diabetes mellitus,Cerebrovascular accident (CVA) due to embolism of left middle cerebral artery.  Patient denies significant past pulmonary problems but has been on oxygen for some time. Former smoker - stopped years ago 20 pack years, presently uses snuff

## 2019-08-04 NOTE — SUBJECTIVE & OBJECTIVE
Past Medical History:   Diagnosis Date    Aortic stenosis, mild     [facility]otid [facility]     [facility]    Chronic kidney disease, stage IV (severe)     Congestive heart failure, NYHA class 3, chronic, systolic     Coronary artery disease     Diabetes mellitus with no complication     GERD (gastroesophageal reflux disease)     History of CVA (cerebrovascular accident) 11/2018    MRI  reveals right parietal lobe infarct    Hx of CABG 5/28/2019    Hyperlipidemia associated with type 2 diabetes mellitus     Hypertension associated with diabetes     Mild AI (aortic insufficiency)     Moderate mitral regurgitation     Type II diabetes mellitus with renal manifestations        Past Surgical History:   Procedure Laterality Date    CORONARY ARTERY BYPASS GRAFT      CORONARY STENT PLACEMENT      KIDNEY STONE SURGERY         Review of patient's allergies indicates:   Allergen Reactions    Influenza virus vaccines Other (See Comments)     Red streaks up arm    Isosorbide (solution)      Headache    Penicillins Swelling       No current facility-administered medications on file prior to encounter.      Current Outpatient Medications on File Prior to Encounter   Medication Sig    amlodipine (NORVASC) 10 MG tablet Take 10 mg by mouth once daily.    clopidogrel (PLAVIX) 75 mg tablet Take 1 tablet (75 mg total) by mouth once daily.    furosemide (LASIX) 40 MG tablet Take 1 tablet (40 mg total) by mouth once daily.    hydrALAZINE (APRESOLINE) 100 MG tablet Take 1 tablet (100 mg total) by mouth 2 (two) times daily.    insulin glargine (LANTUS) 100 unit/mL injection Inject 35 Units into the skin every evening.    isosorbide dinitrate (ISORDIL) 20 MG tablet Take 1 tablet (20 mg total) by mouth 3 (three) times daily.    metoprolol succinate (TOPROL-XL) 50 MG 24 hr tablet Take 50 mg by mouth once daily.    omeprazole (PRILOSEC) 20 MG capsule Take 20 mg by mouth once daily.     prazosin (MINIPRESS) 2 MG Cap Take 1 mg by mouth 2 (two) times daily.    rosuvastatin (CRESTOR) 40 MG Tab Take 1 tablet (40 mg total) by mouth every evening.    sertraline (ZOLOFT) 50 MG tablet Take 1 tablet (50 mg total) by mouth once daily.    ACCU-CHEK MASHA PLUS Strp     aspirin 325 MG tablet Take 81 mg by mouth once daily.     ondansetron (ZOFRAN-ODT) 4 MG TbDL Take 1 tablet (4 mg total) by mouth every 8 (eight) hours as needed (prn nausea/vomiting).    prednisoLONE acetate (PRED FORTE) 1 % DrpS      Family History     None        Tobacco Use    Smoking status: Former Smoker     Packs/day: 1.00     Years: 20.00     Pack years: 20.00     Types: Cigarettes    Smokeless tobacco: Current User     Types: Snuff   Substance and Sexual Activity    Alcohol use: No    Drug use: No    Sexual activity: Not on file     Review of Systems   Constitution: Positive for decreased appetite and malaise/fatigue.   HENT: Negative.    Eyes: Negative.    Cardiovascular: Positive for dyspnea on exertion.   Respiratory: Positive for shortness of breath.    Endocrine: Negative.    Hematologic/Lymphatic: Negative.    Skin: Negative.    Musculoskeletal: Negative.    Gastrointestinal: Negative.    Genitourinary: Negative.    Neurological: Negative.    Psychiatric/Behavioral: Negative.    Allergic/Immunologic: Negative.    All other systems reviewed and are negative.    Objective:     Vital Signs (Most Recent):  Temp: 97.2 °F (36.2 °C) (08/04/19 1517)  Pulse: 97 (08/04/19 1524)  Resp: 17 (08/04/19 1517)  BP: (!) 123/58 (08/04/19 1517)  SpO2: (!) 94 % (08/04/19 1517) Vital Signs (24h Range):  Temp:  [96.2 °F (35.7 °C)-98.2 °F (36.8 °C)] 97.2 °F (36.2 °C)  Pulse:  [61-97] 97  Resp:  [16-20] 17  SpO2:  [88 %-96 %] 94 %  BP: (113-151)/(53-66) 123/58     Weight: 94.7 kg (208 lb 12.4 oz)  Body mass index is 30.83 kg/m².    SpO2: (!) 94 %  O2 Device (Oxygen Therapy): nasal cannula      Intake/Output Summary (Last 24 hours) at 8/4/2019  1633  Last data filed at 8/4/2019 1608  Gross per 24 hour   Intake 1512.86 ml   Output 725 ml   Net 787.86 ml       Lines/Drains/Airways     Peripheral Intravenous Line                 Peripheral IV - Single Lumen 08/03/19 20 G Right Wrist 1 day         Peripheral IV - Single Lumen 08/04/19 1130 Anterior;Distal;Left Forearm less than 1 day                Physical Exam   Constitutional: He is oriented to person, place, and time. He appears distressed.   HENT:   Head: Normocephalic and atraumatic.   Nose: Nose normal.   Mouth/Throat: Oropharynx is clear and moist.   Eyes: Conjunctivae and EOM are normal. No scleral icterus.   Neck: Normal range of motion. Neck supple. JVD present. No thyromegaly present.   Cardiovascular: Normal rate, regular rhythm, S1 normal and S2 normal. Exam reveals no gallop, no S3, no S4 and no friction rub.   Murmur heard.  Pulmonary/Chest: Effort normal. No stridor. No respiratory distress. He has no wheezes. He has rales. He exhibits no tenderness.   Abdominal: Soft. Bowel sounds are normal. He exhibits no distension and no mass. There is no tenderness. There is no rebound.   Genitourinary:   Genitourinary Comments: Deferred   Musculoskeletal: Normal range of motion. He exhibits edema. He exhibits no tenderness or deformity.   Lymphadenopathy:     He has no cervical adenopathy.   Neurological: He is alert and oriented to person, place, and time. He exhibits normal muscle tone. Coordination normal.   Skin: Skin is warm and dry. No rash noted. He is not diaphoretic. No erythema. No pallor.   Psychiatric: He has a normal mood and affect. His behavior is normal. Judgment and thought content normal.   Nursing note and vitals reviewed.      Significant Labs:   All pertinent lab results from the last 24 hours have been reviewed. and   Recent Lab Results       08/04/19  1125   08/04/19  0906   08/04/19  0643   08/04/19  0456   08/04/19  0021        Procalcitonin               Anion Gap       8        aPTT   39.5  Comment:  aPTT therapeutic range = 39-69 seconds   54.4  Comment:  aPTT therapeutic range = 39-69 seconds 37.4  Comment:  aPTT therapeutic range = 39-69 seconds     Baso #       0.01       Basophil%       0.1       Blood Culture, Routine               BUN, Bld       41       Calcium       8.7       Chloride       104       CO2       26       Creatinine       3.2       Differential Method       Automated       eGFR if        20       eGFR if non        18  Comment:  Calculation used to obtain the estimated glomerular filtration  rate (eGFR) is the CKD-EPI equation.          Eos #       0.0       Eosinophil%       0.3       Glucose       155       Gran # (ANC)       7.3       Gran%       82.7       Hematocrit       29.3       Hemoglobin       9.5       Coumadin Monitoring INR               Lymph #       0.5       Lymph%       5.7       Magnesium               MCH       29.1       MCHC       32.4       MCV       90       Mono #       1.0       Mono%       11.4       MPV       10.3       Phosphorus               Platelets       222       POCT Glucose 198   138         Potassium       3.9       Protime               RBC       3.27       RDW       15.1       Sodium       138       Troponin I               WBC       8.79                        08/03/19  2203   08/03/19  2201   08/03/19  1751   08/03/19  1703   08/03/19  1636        Procalcitonin               Anion Gap               aPTT               Baso #               Basophil%               Blood Culture, Routine       No Growth to date[P] No Growth to date[P]     BUN, Bld               Calcium               Chloride               CO2               Creatinine               Differential Method               eGFR if                eGFR if non                Eos #               Eosinophil%               Glucose               Gran # (ANC)               Gran%               Hematocrit                Hemoglobin               Coumadin Monitoring INR               Lymph #               Lymph%               Magnesium         1.8     MCH               MCHC               MCV               Mono #               Mono%               MPV               Phosphorus         3.2     Platelets               POCT Glucose   177 106         Potassium               Protime               RBC               RDW               Sodium               Troponin I 0.321  Comment:  The reference interval for Troponin I represents the 99th percentile   cutoff   for our facility and is consistent with 3rd generation assay   performance.               WBC                                08/03/19  1635        Procalcitonin 0.18  Comment:  A concentration < 0.25 ng/mL represents a low risk bacterial   infection.  Procalcitonin may not be accurate among patients with localized   infection, recent trauma or major surgery, immunosuppressed state,   invasive fungal infection, renal dysfunction. Decisions regarding   initiation or continuation of antibiotic therapy should not be based   solely on procalcitonin levels.       Anion Gap       aPTT 23.2  Comment:  aPTT therapeutic range = 39-69 seconds     Baso #       Basophil%       Blood Culture, Routine       BUN, Bld       Calcium       Chloride       CO2       Creatinine       Differential Method       eGFR if        eGFR if non        Eos #       Eosinophil%       Glucose       Gran # (ANC)       Gran%       Hematocrit       Hemoglobin       Coumadin Monitoring INR 0.9  Comment:  Coumadin Therapy:  2.0 - 3.0 for INR for all indicators except mechanical heart valves  and antiphospholipid syndromes which should use 2.5 - 3.5.       Lymph #       Lymph%       Magnesium       MCH       MCHC       MCV       Mono #       Mono%       MPV       Phosphorus       Platelets       POCT Glucose       Potassium       Protime 10.0     RBC       RDW       Sodium       Troponin I  0.358  Comment:  The reference interval for Troponin I represents the 99th percentile   cutoff   for our facility and is consistent with 3rd generation assay   performance.        0.350  Comment:  The reference interval for Troponin I represents the 99th percentile   cutoff   for our facility and is consistent with 3rd generation assay   performance.       WBC             Significant Imaging: Echocardiogram:   2D echo with color flow doppler:   Results for orders placed or performed during the hospital encounter of 05/28/19   2D echo with color flow doppler   Result Value Ref Range    QEF 25 (A) 55 - 65    Mitral Valve Regurgitation MILD     Aortic Valve Stenosis MILD (A)     Tricuspid Valve Regurgitation MILD     Narrative    Date of Procedure: 05/28/2019        TEST DESCRIPTION   Technical Quality: This is a technically challenging study. There is poor endocardial definition. This study was performed in conjunction with a 3ml intravenous injection of Optison contrast agent.     Aorta: The aortic root is upper limit of normal, measuring 3.4 cm at sinotubular junction and 3.6 cm at Sinuses of Valsalva. The proximal ascending aorta is normal in size, measuring 3.2 cm across.     Left Atrium: The left atrial volume index is severely enlarged, measuring 59.54 cc/m2.     Left Ventricle: The left ventricle is normal in size, with an end-diastolic diameter of 5.0 cm, and an end-systolic diameter of 4.4 cm. Wall thickness is increased, with the septum measuring 2.2 cm and the posterior wall measuring 1.6 cm across. Relative   wall thickness was increased at 0.64, and the LV mass index was increased at 260.4 g/m2 consistent with concentric left ventricular hypertrophy. The following segments were moderately hypokinetic: mid inferolateral wall, basal inferolateral wall.  The following segments were severely hypokinetic: mid inferior wall.  Left ventricular systolic function appears severely depressed. Visually estimated  ejection fraction is 25-30%. The LV Doppler derived stroke volume equals 115.0 ccs.     Diastolic indices: E wave velocity 1.4 m/s, E/A ratio 1.7,  msec., E/e' ratio(lat) 24. Diastolic function is indeterminate.     Right Atrium: The right atrium is normal in size, measuring 4.5 cm in length and 3.5 cm in width in the apical view.     Right Ventricle: The right ventricle is normal in size. Global right ventricular systolic function appears normal. Tricuspid annular plane systolic excursion (TAPSE) is 1.2 cm.     Aortic Valve:  The aortic valve is moderately sclerotic with mildly restricted leaflet mobility. The peak velocity obtained across the aortic valve is 2.55 m/s, which translates to a peak gradient of 26 mmHg. The mean gradient is 16 mmHg. Using a left   ventricular outflow tract diameter of 2.1 cm, a left ventricular outflow tract velocity time integral of 33 cm, and a peak instantaneous transvalvular velocity time integral of 71 cm, the calculated aortic valve area is 1.61 cm2(AVAi is 0.74 cm2/m2),   consistent with mild aortic stenosis.     Mitral Valve:  The mitral valve is mildly sclerotic. The pressure half time is 84 msec. The calculated mitral valve area is 2.62 cm2. There is mild mitral regurgitation. There is mitral annular calcification.     Tricuspid Valve:  The tricuspid valve is normal in structure. There is mild tricuspid regurgitation.     Pulmonary Valve:  The pulmonic valve is not well seen.     IVC: Right atrial pressure as assessed by IVC dynamics is normal at not visualized mmHg.     Intracavitary: There is no evidence of pericardial effusion, intracavity mass, thrombi, or vegetation.         CONCLUSIONS     1 - Severely depressed left ventricular systolic function (EF 25-30%).     2 - Indeterminate LV diastolic function.     3 - Normal right ventricular systolic function .     4 - Mild aortic stenosis, SERA = 1.61 cm2, AVAi = 0.74 cm2/m2, peak velocity = 2.55 m/s, mean gradient = 16  mmHg.     5 - Severe left atrial enlargement.     6 - Concentric hypertrophy.             This document has been electronically    SIGNED BY: Yandel Raman MD On: 05/28/2019 12:41    and X-Ray: CXR: X-Ray Chest 1 View (CXR): No results found for this visit on 08/03/19.

## 2019-08-04 NOTE — ASSESSMENT & PLAN NOTE
Creatinine 2.8 (Baseline 2.3-2.7)  IV diuresis   Careful fluid management.    Monitor urine output and renal function chemistries.    Nephrology consult pending course   8/4/19 -  Nephrology consult obtained

## 2019-08-05 PROBLEM — J18.9 PNEUMONIA: Status: RESOLVED | Noted: 2019-08-03 | Resolved: 2019-08-05

## 2019-08-05 LAB
ANION GAP SERPL CALC-SCNC: 12 MMOL/L (ref 8–16)
APTT BLDCRRT: 66.2 SEC (ref 21–32)
APTT BLDCRRT: 66.3 SEC (ref 21–32)
BASOPHILS # BLD AUTO: 0 K/UL (ref 0–0.2)
BASOPHILS NFR BLD: 0 % (ref 0–1.9)
BUN SERPL-MCNC: 43 MG/DL (ref 8–23)
CALCIUM SERPL-MCNC: 9 MG/DL (ref 8.7–10.5)
CHLORIDE SERPL-SCNC: 100 MMOL/L (ref 95–110)
CO2 SERPL-SCNC: 25 MMOL/L (ref 23–29)
CREAT SERPL-MCNC: 3.4 MG/DL (ref 0.5–1.4)
DIFFERENTIAL METHOD: ABNORMAL
EOSINOPHIL # BLD AUTO: 0 K/UL (ref 0–0.5)
EOSINOPHIL NFR BLD: 0 % (ref 0–8)
ERYTHROCYTE [DISTWIDTH] IN BLOOD BY AUTOMATED COUNT: 14.7 % (ref 11.5–14.5)
EST. GFR  (AFRICAN AMERICAN): 19 ML/MIN/1.73 M^2
EST. GFR  (NON AFRICAN AMERICAN): 16 ML/MIN/1.73 M^2
GLUCOSE SERPL-MCNC: 324 MG/DL (ref 70–110)
HCT VFR BLD AUTO: 28.3 % (ref 40–54)
HGB BLD-MCNC: 9.4 G/DL (ref 14–18)
LYMPHOCYTES # BLD AUTO: 0.2 K/UL (ref 1–4.8)
LYMPHOCYTES NFR BLD: 2.9 % (ref 18–48)
MCH RBC QN AUTO: 29.7 PG (ref 27–31)
MCHC RBC AUTO-ENTMCNC: 33.2 G/DL (ref 32–36)
MCV RBC AUTO: 89 FL (ref 82–98)
MONOCYTES # BLD AUTO: 0.2 K/UL (ref 0.3–1)
MONOCYTES NFR BLD: 2.4 % (ref 4–15)
NEUTROPHILS # BLD AUTO: 6 K/UL (ref 1.8–7.7)
NEUTROPHILS NFR BLD: 95.2 % (ref 38–73)
PLATELET # BLD AUTO: 202 K/UL (ref 150–350)
PMV BLD AUTO: 9.9 FL (ref 9.2–12.9)
POCT GLUCOSE: 325 MG/DL (ref 70–110)
POCT GLUCOSE: 338 MG/DL (ref 70–110)
POCT GLUCOSE: 364 MG/DL (ref 70–110)
POCT GLUCOSE: 390 MG/DL (ref 70–110)
POCT GLUCOSE: 410 MG/DL (ref 70–110)
POCT GLUCOSE: 410 MG/DL (ref 70–110)
POTASSIUM SERPL-SCNC: 3.7 MMOL/L (ref 3.5–5.1)
RBC # BLD AUTO: 3.17 M/UL (ref 4.6–6.2)
SODIUM SERPL-SCNC: 137 MMOL/L (ref 136–145)
WBC # BLD AUTO: 6.28 K/UL (ref 3.9–12.7)

## 2019-08-05 PROCEDURE — 36415 COLL VENOUS BLD VENIPUNCTURE: CPT | Mod: HCNC

## 2019-08-05 PROCEDURE — 25000242 PHARM REV CODE 250 ALT 637 W/ HCPCS: Mod: HCNC | Performed by: INTERNAL MEDICINE

## 2019-08-05 PROCEDURE — 27100171 HC OXYGEN HIGH FLOW UP TO 24 HOURS: Mod: HCNC

## 2019-08-05 PROCEDURE — 85025 COMPLETE CBC W/AUTO DIFF WBC: CPT | Mod: HCNC

## 2019-08-05 PROCEDURE — 25000003 PHARM REV CODE 250: Mod: HCNC | Performed by: NURSE PRACTITIONER

## 2019-08-05 PROCEDURE — 99233 PR SUBSEQUENT HOSPITAL CARE,LEVL III: ICD-10-PCS | Mod: HCNC,,, | Performed by: INTERNAL MEDICINE

## 2019-08-05 PROCEDURE — 63600175 PHARM REV CODE 636 W HCPCS: Mod: HCNC | Performed by: NURSE PRACTITIONER

## 2019-08-05 PROCEDURE — 63600175 PHARM REV CODE 636 W HCPCS: Mod: HCNC | Performed by: INTERNAL MEDICINE

## 2019-08-05 PROCEDURE — 85730 THROMBOPLASTIN TIME PARTIAL: CPT | Mod: HCNC

## 2019-08-05 PROCEDURE — 94761 N-INVAS EAR/PLS OXIMETRY MLT: CPT | Mod: HCNC

## 2019-08-05 PROCEDURE — 94640 AIRWAY INHALATION TREATMENT: CPT | Mod: HCNC

## 2019-08-05 PROCEDURE — 21400001 HC TELEMETRY ROOM: Mod: HCNC

## 2019-08-05 PROCEDURE — 80048 BASIC METABOLIC PNL TOTAL CA: CPT | Mod: HCNC

## 2019-08-05 PROCEDURE — 99233 SBSQ HOSP IP/OBS HIGH 50: CPT | Mod: HCNC,,, | Performed by: INTERNAL MEDICINE

## 2019-08-05 RX ADMIN — INSULIN ASPART 10 UNITS: 100 INJECTION, SOLUTION INTRAVENOUS; SUBCUTANEOUS at 11:08

## 2019-08-05 RX ADMIN — INSULIN ASPART 10 UNITS: 100 INJECTION, SOLUTION INTRAVENOUS; SUBCUTANEOUS at 05:08

## 2019-08-05 RX ADMIN — FUROSEMIDE 80 MG: 10 INJECTION, SOLUTION INTRAMUSCULAR; INTRAVENOUS at 09:08

## 2019-08-05 RX ADMIN — HEPARIN SODIUM 16 UNITS/KG/HR: 10000 INJECTION, SOLUTION INTRAVENOUS at 11:08

## 2019-08-05 RX ADMIN — METHYLPREDNISOLONE SODIUM SUCCINATE 80 MG: 40 INJECTION, POWDER, FOR SOLUTION INTRAMUSCULAR; INTRAVENOUS at 05:08

## 2019-08-05 RX ADMIN — PRAZOSIN HYDROCHLORIDE 1 MG: 1 CAPSULE ORAL at 09:08

## 2019-08-05 RX ADMIN — INSULIN ASPART 8 UNITS: 100 INJECTION, SOLUTION INTRAVENOUS; SUBCUTANEOUS at 05:08

## 2019-08-05 RX ADMIN — ROSUVASTATIN CALCIUM 40 MG: 10 TABLET, COATED ORAL at 09:08

## 2019-08-05 RX ADMIN — HYDRALAZINE HYDROCHLORIDE 100 MG: 50 TABLET ORAL at 08:08

## 2019-08-05 RX ADMIN — IPRATROPIUM BROMIDE AND ALBUTEROL SULFATE 3 ML: .5; 3 SOLUTION RESPIRATORY (INHALATION) at 12:08

## 2019-08-05 RX ADMIN — ASPIRIN 81 MG: 81 TABLET, COATED ORAL at 08:08

## 2019-08-05 RX ADMIN — SERTRALINE HYDROCHLORIDE 50 MG: 50 TABLET ORAL at 08:08

## 2019-08-05 RX ADMIN — METOPROLOL SUCCINATE 50 MG: 50 TABLET, EXTENDED RELEASE ORAL at 08:08

## 2019-08-05 RX ADMIN — HEPARIN SODIUM 16 UNITS/KG/HR: 10000 INJECTION, SOLUTION INTRAVENOUS at 03:08

## 2019-08-05 RX ADMIN — ISOSORBIDE DINITRATE 20 MG: 10 TABLET ORAL at 02:08

## 2019-08-05 RX ADMIN — ISOSORBIDE DINITRATE 20 MG: 10 TABLET ORAL at 08:08

## 2019-08-05 RX ADMIN — FUROSEMIDE 80 MG: 10 INJECTION, SOLUTION INTRAMUSCULAR; INTRAVENOUS at 02:08

## 2019-08-05 RX ADMIN — IPRATROPIUM BROMIDE AND ALBUTEROL SULFATE 3 ML: .5; 3 SOLUTION RESPIRATORY (INHALATION) at 08:08

## 2019-08-05 RX ADMIN — PRAZOSIN HYDROCHLORIDE 1 MG: 1 CAPSULE ORAL at 08:08

## 2019-08-05 RX ADMIN — HYDRALAZINE HYDROCHLORIDE 100 MG: 50 TABLET ORAL at 09:08

## 2019-08-05 RX ADMIN — INSULIN ASPART 5 UNITS: 100 INJECTION, SOLUTION INTRAVENOUS; SUBCUTANEOUS at 01:08

## 2019-08-05 RX ADMIN — RAMELTEON 8 MG: 8 TABLET, FILM COATED ORAL at 12:08

## 2019-08-05 RX ADMIN — FUROSEMIDE 80 MG: 10 INJECTION, SOLUTION INTRAMUSCULAR; INTRAVENOUS at 08:08

## 2019-08-05 RX ADMIN — BUDESONIDE 0.5 MG: 0.5 SUSPENSION RESPIRATORY (INHALATION) at 08:08

## 2019-08-05 RX ADMIN — INSULIN DETEMIR 10 UNITS: 100 INJECTION, SOLUTION SUBCUTANEOUS at 08:08

## 2019-08-05 RX ADMIN — RAMELTEON 8 MG: 8 TABLET, FILM COATED ORAL at 11:08

## 2019-08-05 RX ADMIN — AMLODIPINE BESYLATE 10 MG: 10 TABLET ORAL at 08:08

## 2019-08-05 RX ADMIN — INSULIN ASPART 4 UNITS: 100 INJECTION, SOLUTION INTRAVENOUS; SUBCUTANEOUS at 09:08

## 2019-08-05 RX ADMIN — ISOSORBIDE DINITRATE 20 MG: 10 TABLET ORAL at 09:08

## 2019-08-05 RX ADMIN — PANTOPRAZOLE SODIUM 40 MG: 40 TABLET, DELAYED RELEASE ORAL at 08:08

## 2019-08-05 RX ADMIN — CLOPIDOGREL BISULFATE 75 MG: 75 TABLET ORAL at 08:08

## 2019-08-05 NOTE — ASSESSMENT & PLAN NOTE
Stage 4  Started DBT this AM  Cont IV diuresis  Cont BB, stop ACE due to SNEHA  Rec palliative care eval    8/5/19  -Some improvement overnight  -Continue IV diuresis  -Continue Coreg  -No ACEi/ARB given worsening renal function

## 2019-08-05 NOTE — PLAN OF CARE
Problem: Adult Inpatient Plan of Care  Goal: Readiness for Transition of Care  Outcome: Ongoing (interventions implemented as appropriate)  Plan of care reviewed with patient and wife.    Comments: Patient A&Ox4.   8L of high flow.  NSR on tele.  Diabetic diet. ACHS accu checks..  Uses urinal. Up to BSC with assistance.  Heparin gtt infusing. Therapeutic. Dobutamine gtt infusing.  Denies pain.  All questions answered.  Will continue to monitor.    Sada Andrea RN

## 2019-08-05 NOTE — PROGRESS NOTES
[facility]  Pulmonology  Progress Note    Patient Name: Carlitos Ellington  MRN: [id number]  Admission Date: 8/3/2019  Hospital Length of Stay: 1 days  Code Status: DNR  Attending Provider: Luis Garibay MD  Primary Care Provider: Raza Uribe MD   Principal Problem: Hypoxemia    Subjective:     Seen and examined.  O2 sat 96% on 10 L O2 via nasal cannula      On heparin drip.  On dobutamine drip.    Interval History:     Review of Systems   Constitutional: Positive for malaise/fatigue.   HENT: Negative for nosebleeds.    Eyes: Negative for discharge and redness.   Cardiovascular: Positive for leg swelling.   Gastrointestinal: Negative for abdominal pain.   Genitourinary: Negative for hematuria.   Musculoskeletal: Positive for back pain and joint pain.   Skin: Negative for rash.   Neurological: Negative for seizures.   Endo/Heme/Allergies: Bruises/bleeds easily.   Psychiatric/Behavioral: Negative for substance abuse.       Objective:     Vital Signs (Most Recent):  Temp: 98.2 °F (36.8 °C) (08/05/19 1416)  Pulse: 95 (08/05/19 1514)  Resp: 18 (08/05/19 1416)  BP: (!) 143/65 (08/05/19 1416)  SpO2: 96 % (08/05/19 1416) Vital Signs (24h Range):  Temp:  [96.7 °F (35.9 °C)-98.2 °F (36.8 °C)] 98.2 °F (36.8 °C)  Pulse:  [] 95  Resp:  [16-20] 18  SpO2:  [88 %-98 %] 96 %  BP: (126-151)/(60-67) 143/65     Weight: 97 kg (213 lb 13.5 oz)  Body mass index is 31.58 kg/m².      Intake/Output Summary (Last 24 hours) at 8/5/2019 1532  Last data filed at 8/5/2019 0900  Gross per 24 hour   Intake 1298.12 ml   Output 1600 ml   Net -301.88 ml       Physical Exam    Vents:  Oxygen Concentration (%): (S) 50 (08/05/19 0013)    Lines/Drains/Airways     Peripheral Intravenous Line                 Peripheral IV - Single Lumen 08/03/19 20 G Right Wrist 2 days         Peripheral IV - Single Lumen 08/04/19 1130 Anterior;Distal;Left Forearm 1 day                Significant Labs:    CBC/Anemia Profile:  Recent Labs   Lab  08/04/19  0456 08/05/19  0452   WBC 8.79 6.28   HGB 9.5* 9.4*   HCT 29.3* 28.3*    202   MCV 90 89   RDW 15.1* 14.7*        Chemistries:  Recent Labs   Lab 08/03/19  1636 08/04/19  0456 08/05/19  0452   NA  --  138 137   K  --  3.9 3.7   CL  --  104 100   CO2  --  26 25   BUN  --  41* 43*   CREATININE  --  3.2* 3.4*   CALCIUM  --  8.7 9.0   MG 1.8  --   --    PHOS 3.2  --   --      BNP 1711 August 3, 2019  Troponin 0.3 x3    Significant Imaging:    CT: I have reviewed all pertinent results/findings within the past 24 hours and my personal findings are:  Emphysematous changes with questionable pulmonary congestion.  Bibasilar atelectatic change with bilateral pleural fluid collections.  Suspect small loculated component of pleural fluid along the left lateral chest wall.  8 mm pulmonary nodule right upper lobe     V/Q scan August 3, 2019 negative for pulmonary embolism    2D echo August 3, 2019  Emphysematous changes with questionable pulmonary congestion.  Bibasilar atelectatic change with bilateral pleural fluid collections.  Suspect small loculated component of pleural fluid along the left lateral chest wall.  8 mm pulmonary nodule right upper lobe      ABG  Recent Labs   Lab 08/03/19  1146   PH 7.408   PO2 55*   PCO2 36.0   HCO3 22.7*   BE -2     Assessment/Plan:     * Acute on Chronic hypoxic respiratory failure   8/4 supplemental oxygen  8/5 wean O2 as tolerated.  Diuresis.    Panlobular emphysema  8/4 Start jet nebs  8/5 O2 keep sat above 90%.  Nebs p.r.n.    Pleural effusion, bilateral  8/4 Very small effusions, consider fluid restriction cautious diuresis   8/5 IV Lasix 80 mg Q 8 hr.     Elevated troponin  Possible NSTEMI.  Aspirin statin Plavix IV heparin.    Acute on chronic combined systolic and diastolic heart failure  O2 keep sat above 90%.  IV Lasix.  Strict I&Os.  Dobutamine drip.    Chronic kidney disease, stage IV (severe)  Strict I&Os.  Monitor BMP.  Monitor urine output.  On IV Lasix 80 mg  3 times daily.           Lloyd Brooks MD  Pulmonology  [facility]

## 2019-08-05 NOTE — HOSPITAL COURSE
8/5/19-Patient seen and examined today.  Feels ok. SOB improved. No chest pain. 2D echo showed EF of 25-30%, DD, mild AS. Labs reviewed, creatinine 3.4 Troponin trending down.    8/6/19-Patient seen and examined today. Seems to feel ok. SOB greatly improved. Remains chest pain free. Labs reviewed. Creatinine 3.5. Will wean dobutamine today and assess response.

## 2019-08-05 NOTE — ASSESSMENT & PLAN NOTE
Patient has h/o CKD 4 with baseline creatinine of about 2.5 to 2.7. Creatinine has now increased to 3.4 on 8/5/19. SNEHA likely multifactorial and related to infection (pneumonia) and mild ADHF (pulmonary edema on CXR). Will treat pneumonia and continue Lasix dose to 80 mg IV tid. UOP of about 1.8 liters since admission.   No need for dialysis at present. Urinalysis shows chronic proteinuria, no hematuria or increased WBC. Renal US shoes no signs of obstruction. Avoid NSAIDs, ARB, ACE-I.

## 2019-08-05 NOTE — PLAN OF CARE
Met with patient. Patient is independent with adls and iadls with assistive devices.  Patients wife is caregiver but she was not available during the assessment.  Dr. Garibay came to patient's room and discussed palliative care.  Patient in agreement.      Updated white board with 's name and number. Transitional Care Folder, Discharge Planning Begins on Admission pamphlet, [facility] Bedside Delivery pamphlet, Advance Directive information given to patient along with the contact information given.Instructed patient or family to call with any questions or concerns.    D/c plans: Palliative/Hospice care  Transportation: wife  [facility]: pt declined  Pref Pharmacy:  [facility]  Bedside Pharmacy: No        [facility] [address] [place] [address] [id number]  [address] [facility] [address]  Phone: [phone] Fax: [phone]    Raza Uribe MD  Payor: [employer] MANAGED MEDICARE / Plan: [employer] MEDICARE HMO / Product Type: Capitation /         08/05/19 1030   Discharge Assessment   Assessment Type Discharge Planning Assessment   Confirmed/corrected address and phone number on facesheet? Yes   Assessment information obtained from? Patient   Expected Length of Stay (days)   (tbd)   Communicated expected length of stay with patient/caregiver yes   Prior to hospitilization cognitive status: Alert/Oriented   Prior to hospitalization functional status: Assistive Equipment   Current cognitive status: Alert/Oriented   Current Functional Status: Assistive Equipment   Facility Arrived From: home   Lives With spouse;child(sarwat), adult   Able to Return to Prior Arrangements yes   Is patient able to care for self after discharge? Unable to determine at this time (comments)   Who are your caregiver(s) and their phone number(s)? Yodit Ellington, spouse [phone]   Patient's perception of discharge disposition home health   Readmission Within the Last 30 Days no previous admission in  last 30 days   Patient currently being followed by outpatient case management? No   Patient currently receives any other outside agency services? No   Equipment Currently Used at Home cane, straight;oxygen;glucometer;rollator   Do you have any problems affording any of your prescribed medications? No   Is the patient taking medications as prescribed? yes   Does the patient have transportation home? Yes   Transportation Anticipated family or friend will provide   Does the patient receive services at the [facility]? No   Discharge Plan A Hospice/home   DME Needed Upon Discharge  none   Patient/Family in Agreement with Plan yes

## 2019-08-05 NOTE — PROGRESS NOTES
[facility]  Cardiology  Progress Note    Patient Name: Carlitos Ellington  MRN: [id number]  Admission Date: 8/3/2019  Hospital Length of Stay: 1 days  Code Status: DNR   Attending Physician: Luis Garibay MD   Primary Care Physician: Raza Uribe MD  Expected Discharge Date:   Principal Problem:Hypoxemia    Subjective:   HPI:  Carlitos Ellington is a 78 y.o. male patient with a PMHx of CABG, CVA, CAD, CHF, DM, GERD, HTN, HLD, and mild AI who presents to the Emergency Department for evaluation of generalized weakness which onset suddenly when he woke up in morning. Info obtained from chart.  Pt denies SOB at this time, but wife states he seems SOB. Symptoms are constant and moderate in severity. No exacerbating factors reported. Associated sxs include fatigue and fever. Pt is on continuous oxygen at 2L NC. Patient denies any CP, abd pain, hematuria, dysuria, blood in stool, difficulty urinating, frequency, n/v/d, leg swelling, palpitations, and all other sxs at this time.  Per EMS pt's O2 sats dropped and was placed on 4L NC. Wife gave pt 2 tylenol x1 hour PTA.  ED work-up resulted h/h of 9.8/29.9, BUN 35, Creatinine 2.8, BNP 1711, troponin 0.242, ABG: PO2 55, HCO3 22.7 on  2L NC. CXR notes pulmonary edema.  Bilateral pleural fluid collections left greater than right with atelectasis or consolidation left lower lobe. Cardiology consult for CHF.    Started pt on DBT 2.5 for diuresis, renal function is worse, trop rising to 0.358, on heparin drip. Need records from primary cardiologist.  Discussed palliation also as he is on DBT now.     Hospital Course:   8/5/19-Patient seen and examined today.  Feels ok. SOB improved. No chest pain. 2D echo showed EF of 25-30%, DD, mild AS. Remains on dobutamine. Labs reviewed, creatinine 3.4 Troponin trending down.        Review of Systems   Constitution: Positive for malaise/fatigue.   HENT: Negative.    Eyes: Negative.    Cardiovascular: Positive for dyspnea on  exertion.   Respiratory: Positive for shortness of breath.    Endocrine: Negative.    Hematologic/Lymphatic: Negative.    Skin: Negative.    Musculoskeletal: Negative.    Gastrointestinal: Negative.    Genitourinary: Negative.    Neurological: Negative.    Psychiatric/Behavioral: Negative.    Allergic/Immunologic: Negative.      Objective:     Vital Signs (Most Recent):  Temp: 98 °F (36.7 °C) (08/05/19 0847)  Pulse: 100 (08/05/19 1321)  Resp: 18 (08/05/19 1225)  BP: (!) 151/67 (08/05/19 0847)  SpO2: 97 % (08/05/19 1225) Vital Signs (24h Range):  Temp:  [96.7 °F (35.9 °C)-98 °F (36.7 °C)] 98 °F (36.7 °C)  Pulse:  [] 100  Resp:  [16-20] 18  SpO2:  [88 %-98 %] 97 %  BP: (123-151)/(58-67) 151/67     Weight: 97 kg (213 lb 13.5 oz)  Body mass index is 31.58 kg/m².     SpO2: 97 %  O2 Device (Oxygen Therapy): High Flow nasal Cannula      Intake/Output Summary (Last 24 hours) at 8/5/2019 1359  Last data filed at 8/5/2019 0900  Gross per 24 hour   Intake 1298.12 ml   Output 1600 ml   Net -301.88 ml       Lines/Drains/Airways     Peripheral Intravenous Line                 Peripheral IV - Single Lumen 08/03/19 20 G Right Wrist 2 days         Peripheral IV - Single Lumen 08/04/19 1130 Anterior;Distal;Left Forearm 1 day                Physical Exam   Constitutional: He is oriented to person, place, and time. He appears well-developed and well-nourished. No distress.   +conversational dyspnea   HENT:   Head: Normocephalic and atraumatic.   Eyes: Pupils are equal, round, and reactive to light. Right eye exhibits no discharge. Left eye exhibits no discharge.   Neck: Neck supple. JVD present.   Cardiovascular: Normal rate, regular rhythm, S1 normal, S2 normal and normal heart sounds.   No murmur heard.  Pulmonary/Chest: Effort normal. No respiratory distress. He has no wheezes. He has rales.   Abdominal: Soft. He exhibits no distension. There is no tenderness.   Musculoskeletal: He exhibits no edema.   Neurological: He is  alert and oriented to person, place, and time.   Skin: Skin is warm and dry. He is not diaphoretic. No erythema.   Psychiatric: He has a normal mood and affect. His behavior is normal. Thought content normal.   Nursing note and vitals reviewed.      Significant Labs:   CMP   Recent Labs   Lab 08/04/19 0456 08/05/19 0452    137   K 3.9 3.7    100   CO2 26 25   * 324*   BUN 41* 43*   CREATININE 3.2* 3.4*   CALCIUM 8.7 9.0   ANIONGAP 8 12   ESTGFRAFRICA 20* 19*   EGFRNONAA 18* 16*    and CBC   Recent Labs   Lab 08/04/19 0456 08/05/19 0452   WBC 8.79 6.28   HGB 9.5* 9.4*   HCT 29.3* 28.3*    202       Significant Imaging: Echocardiogram:   2D echo with color flow doppler:   Results for orders placed or performed during the hospital encounter of 05/28/19   2D echo with color flow doppler   Result Value Ref Range    QEF 25 (A) 55 - 65    Mitral Valve Regurgitation MILD     Aortic Valve Stenosis MILD (A)     Tricuspid Valve Regurgitation MILD     Narrative    Date of Procedure: 05/28/2019        TEST DESCRIPTION   Technical Quality: This is a technically challenging study. There is poor endocardial definition. This study was performed in conjunction with a 3ml intravenous injection of Optison contrast agent.     Aorta: The aortic root is upper limit of normal, measuring 3.4 cm at sinotubular junction and 3.6 cm at Sinuses of Valsalva. The proximal ascending aorta is normal in size, measuring 3.2 cm across.     Left Atrium: The left atrial volume index is severely enlarged, measuring 59.54 cc/m2.     Left Ventricle: The left ventricle is normal in size, with an end-diastolic diameter of 5.0 cm, and an end-systolic diameter of 4.4 cm. Wall thickness is increased, with the septum measuring 2.2 cm and the posterior wall measuring 1.6 cm across. Relative   wall thickness was increased at 0.64, and the LV mass index was increased at 260.4 g/m2 consistent with concentric left ventricular hypertrophy.  The following segments were moderately hypokinetic: mid inferolateral wall, basal inferolateral wall.  The following segments were severely hypokinetic: mid inferior wall.  Left ventricular systolic function appears severely depressed. Visually estimated ejection fraction is 25-30%. The LV Doppler derived stroke volume equals 115.0 ccs.     Diastolic indices: E wave velocity 1.4 m/s, E/A ratio 1.7,  msec., E/e' ratio(lat) 24. Diastolic function is indeterminate.     Right Atrium: The right atrium is normal in size, measuring 4.5 cm in length and 3.5 cm in width in the apical view.     Right Ventricle: The right ventricle is normal in size. Global right ventricular systolic function appears normal. Tricuspid annular plane systolic excursion (TAPSE) is 1.2 cm.     Aortic Valve:  The aortic valve is moderately sclerotic with mildly restricted leaflet mobility. The peak velocity obtained across the aortic valve is 2.55 m/s, which translates to a peak gradient of 26 mmHg. The mean gradient is 16 mmHg. Using a left   ventricular outflow tract diameter of 2.1 cm, a left ventricular outflow tract velocity time integral of 33 cm, and a peak instantaneous transvalvular velocity time integral of 71 cm, the calculated aortic valve area is 1.61 cm2(AVAi is 0.74 cm2/m2),   consistent with mild aortic stenosis.     Mitral Valve:  The mitral valve is mildly sclerotic. The pressure half time is 84 msec. The calculated mitral valve area is 2.62 cm2. There is mild mitral regurgitation. There is mitral annular calcification.     Tricuspid Valve:  The tricuspid valve is normal in structure. There is mild tricuspid regurgitation.     Pulmonary Valve:  The pulmonic valve is not well seen.     IVC: Right atrial pressure as assessed by IVC dynamics is normal at not visualized mmHg.     Intracavitary: There is no evidence of pericardial effusion, intracavity mass, thrombi, or vegetation.         CONCLUSIONS     1 - Severely depressed  left ventricular systolic function (EF 25-30%).     2 - Indeterminate LV diastolic function.     3 - Normal right ventricular systolic function .     4 - Mild aortic stenosis, SERA = 1.61 cm2, AVAi = 0.74 cm2/m2, peak velocity = 2.55 m/s, mean gradient = 16 mmHg.     5 - Severe left atrial enlargement.     6 - Concentric hypertrophy.             This document has been electronically    SIGNED BY: Yandel Raman MD On: 05/28/2019 12:41    and X-Ray: CXR: X-Ray Chest 1 View (CXR): No results found for this visit on 08/03/19. and X-Ray Chest PA and Lateral (CXR): No results found for this visit on 08/03/19.    Assessment and Plan:   Patient who presents with combined CHF and PNA. Clinically improving. Continue diuresis and dobutamine. Treat PNA. Elevated troponin likely secondary to demand ischemia from above. Patient remains chest pain free.    * Acute on Chronic hypoxic respiratory failure   -Sec to CHF  -Continue diuresis     Pneumonia left lower lobe  -Mgmt as per hospital med, on abx    Elevated troponin  Cont hep drip  Likely sec to demand ischemia  May need repeat ischemic workup if none recently but discuss palliative approach with pt and fam    8/5/19  -Stable overnight, no chest pain  -Review of records in care everywhere reportedly shows patient refused LHC in the past due to concerning of contrast associated nephropathy  -Continue ASA, BB, statin  -Can heparanize for 24-48 hours    Acute on chronic combined systolic and diastolic heart failure  Stage 4  Started DBT this AM  Cont IV diuresis  Cont BB, stop ACE due to SNEHA  Rec palliative care eval    8/5/19  -Some improvement overnight  -Continue IV diuresis  -Continue Coreg  -No ACEi/ARB given worsening renal function  -Continue dobutamine for inotropic support    CAD, multiple vessel  -Continue ASA, statin, BB    Chronic kidney disease, stage IV (severe)  -Nephrology on board, appreciate assistance    Hyperlipidemia associated with type 2 diabetes  mellitus  -Continue statin    Hypertension associated with diabetes  -Cont meds, reduce afterload and titrate        VTE Risk Mitigation (From admission, onward)        Ordered     heparin 25,000 units in dextrose 5% (100 units/ml) IV bolus from bag - ADDITIONAL PRN BOLUS - 60 units/kg (max bolus 4000 units)  As needed (PRN)      08/04/19 0317     heparin 25,000 units in dextrose 5% (100 units/ml) IV bolus from bag - ADDITIONAL PRN BOLUS - 30 units/kg (max bolus 4000 units)  As needed (PRN)      08/04/19 0317     heparin 25,000 units in dextrose 5% 250 mL (100 units/mL) infusion LOW INTENSITY nomogram - [facility]  Continuous      08/03/19 1455     IP VTE HIGH RISK PATIENT  Once      08/03/19 1403          Margret Rankin PA-C  Cardiology  [facility]     Chart reviewed. Dr. Raman examined patient and agrees with plan as outlined above.

## 2019-08-05 NOTE — ASSESSMENT & PLAN NOTE
2/2 acute heart failure exacerbation.    IV diuresis        Continuous oxygen   8/4/19-  Pt is requiring high flow O2 via NC at 15L/min to maintain satisfactory SpO2  Wean FiO2 as tolerated   Increase  IV Diuresis   Continue Antibiotic (Rocephin and Azithromycin )  Add Solumedrol IV short course   Continue bronchodilators   8/5  10 L NC  Abx  Steroids  Pulmonary CC on consult

## 2019-08-05 NOTE — SUBJECTIVE & OBJECTIVE
Interval History: Tolerating therapies. Hospice Care elected. Await consult    Review of Systems   Constitutional: Positive for activity change, appetite change, chills, fatigue and fever.   HENT: Negative for sore throat.    Eyes: Negative for visual disturbance.   Respiratory: Positive for shortness of breath. Negative for cough and chest tightness.    Cardiovascular: Positive for leg swelling. Negative for chest pain and palpitations.   Gastrointestinal: Negative for abdominal distention, abdominal pain, constipation, diarrhea, nausea and vomiting.   Endocrine: Negative for polyuria.   Genitourinary: Negative for decreased urine volume, dysuria, flank pain, frequency and hematuria.   Musculoskeletal: Negative for back pain and gait problem.   Skin: Negative for rash.   Neurological: Negative for syncope, speech difficulty, weakness, light-headedness and headaches.   Psychiatric/Behavioral: Negative for confusion, hallucinations and sleep disturbance.     Objective:     Vital Signs (Most Recent):  Temp: 98 °F (36.7 °C) (08/05/19 1626)  Pulse: 94 (08/05/19 1626)  Resp: 16 (08/05/19 1626)  BP: 135/63 (08/05/19 1626)  SpO2: 95 % (08/05/19 1626) Vital Signs (24h Range):  Temp:  [96.7 °F (35.9 °C)-98.2 °F (36.8 °C)] 98 °F (36.7 °C)  Pulse:  [] 94  Resp:  [16-20] 16  SpO2:  [88 %-98 %] 95 %  BP: (126-151)/(60-67) 135/63     Weight: 97 kg (213 lb 13.5 oz)  Body mass index is 31.58 kg/m².    Intake/Output Summary (Last 24 hours) at 8/5/2019 1735  Last data filed at 8/5/2019 1500  Gross per 24 hour   Intake 595.32 ml   Output 1375 ml   Net -779.68 ml      Physical Exam   Constitutional: He is oriented to person, place, and time. No distress.   HENT:   Head: Normocephalic and atraumatic.   Eyes: EOM are normal.   Neck: Normal range of motion. Neck supple.   Cardiovascular: Normal rate and regular rhythm.   Murmur heard.   Systolic murmur is present with a grade of 3/6.  Pulmonary/Chest: No respiratory distress. He has  wheezes in the right middle field, the right lower field and the left middle field. He has rhonchi in the right middle field, the right lower field and the left middle field. He has rales.   Abdominal: Soft. Bowel sounds are normal. There is no guarding.   Musculoskeletal: He exhibits edema.   Neurological: He is alert and oriented to person, place, and time.   Skin: Skin is warm and dry. Capillary refill takes 2 to 3 seconds.       Significant Labs:   BMP:   Recent Labs   Lab 08/05/19 0452   *      K 3.7      CO2 25   BUN 43*   CREATININE 3.4*   CALCIUM 9.0     CBC:   Recent Labs   Lab 08/04/19 0456 08/05/19 0452   WBC 8.79 6.28   HGB 9.5* 9.4*   HCT 29.3* 28.3*    202     CMP:   Recent Labs   Lab 08/04/19 0456 08/05/19 0452    137   K 3.9 3.7    100   CO2 26 25   * 324*   BUN 41* 43*   CREATININE 3.2* 3.4*   CALCIUM 8.7 9.0   ANIONGAP 8 12   EGFRNONAA 18* 16*     All pertinent labs within the past 24 hours have been reviewed.    Significant Imaging: I have reviewed all pertinent imaging results/findings within the past 24 hours.

## 2019-08-05 NOTE — ASSESSMENT & PLAN NOTE
Cont hep drip  Likely sec to demand ischemia  May need repeat ischemic workup if none recently but discuss palliative approach with pt and fam    8/5/19  -Stable overnight, no chest pain  -Review of records in care everywhere reportedly shows patient refused LHC in the past due to concerning of contrast associated nephropathy  -Continue ASA, BB, statin  -Can heparanize for 24-48 hours

## 2019-08-05 NOTE — SUBJECTIVE & OBJECTIVE
Interval History:     Review of Systems   Constitutional: Positive for malaise/fatigue.   HENT: Negative for nosebleeds.    Eyes: Negative for discharge and redness.   Cardiovascular: Positive for leg swelling.   Gastrointestinal: Negative for abdominal pain.   Genitourinary: Negative for hematuria.   Musculoskeletal: Positive for back pain and joint pain.   Skin: Negative for rash.   Neurological: Negative for seizures.   Endo/Heme/Allergies: Bruises/bleeds easily.   Psychiatric/Behavioral: Negative for substance abuse.       Objective:     Vital Signs (Most Recent):  Temp: 98.2 °F (36.8 °C) (08/05/19 1416)  Pulse: 95 (08/05/19 1514)  Resp: 18 (08/05/19 1416)  BP: (!) 143/65 (08/05/19 1416)  SpO2: 96 % (08/05/19 1416) Vital Signs (24h Range):  Temp:  [96.7 °F (35.9 °C)-98.2 °F (36.8 °C)] 98.2 °F (36.8 °C)  Pulse:  [] 95  Resp:  [16-20] 18  SpO2:  [88 %-98 %] 96 %  BP: (126-151)/(60-67) 143/65     Weight: 97 kg (213 lb 13.5 oz)  Body mass index is 31.58 kg/m².      Intake/Output Summary (Last 24 hours) at 8/5/2019 1532  Last data filed at 8/5/2019 0900  Gross per 24 hour   Intake 1298.12 ml   Output 1600 ml   Net -301.88 ml       Physical Exam    Vents:  Oxygen Concentration (%): (S) 50 (08/05/19 0013)    Lines/Drains/Airways     Peripheral Intravenous Line                 Peripheral IV - Single Lumen 08/03/19 20 G Right Wrist 2 days         Peripheral IV - Single Lumen 08/04/19 1130 Anterior;Distal;Left Forearm 1 day                Significant Labs:    CBC/Anemia Profile:  Recent Labs   Lab 08/04/19  0456 08/05/19  0452   WBC 8.79 6.28   HGB 9.5* 9.4*   HCT 29.3* 28.3*    202   MCV 90 89   RDW 15.1* 14.7*        Chemistries:  Recent Labs   Lab 08/03/19  1636 08/04/19  0456 08/05/19  0452   NA  --  138 137   K  --  3.9 3.7   CL  --  104 100   CO2  --  26 25   BUN  --  41* 43*   CREATININE  --  3.2* 3.4*   CALCIUM  --  8.7 9.0   MG 1.8  --   --    PHOS 3.2  --   --      BNP 1711 August 3, 2019  Troponin  0.3 x3    Significant Imaging:    CT: I have reviewed all pertinent results/findings within the past 24 hours and my personal findings are:  Emphysematous changes with questionable pulmonary congestion.  Bibasilar atelectatic change with bilateral pleural fluid collections.  Suspect small loculated component of pleural fluid along the left lateral chest wall.  8 mm pulmonary nodule right upper lobe     V/Q scan August 3, 2019 negative for pulmonary embolism    2D echo August 3, 2019  Emphysematous changes with questionable pulmonary congestion.  Bibasilar atelectatic change with bilateral pleural fluid collections.  Suspect small loculated component of pleural fluid along the left lateral chest wall.  8 mm pulmonary nodule right upper lobe

## 2019-08-05 NOTE — SUBJECTIVE & OBJECTIVE
Interval History:     8/5/19: patient denies any SOB or pain. He is using urinal for urine collection.         Review of patient's allergies indicates:   Allergen Reactions    Influenza virus vaccines Other (See Comments)     Red streaks up arm    Isosorbide (solution)      Headache    Penicillins Swelling     Current Facility-Administered Medications   Medication Frequency    albuterol-ipratropium 2.5 mg-0.5 mg/3 mL nebulizer solution 3 mL Q6H    amLODIPine tablet 10 mg Daily    aspirin EC tablet 81 mg Daily    azithromycin 500 mg in dextrose 5 % 250 mL IVPB (ready to mix system) Q24H    budesonide nebulizer solution 0.5 mg BID    cefTRIAXone (ROCEPHIN) 1 g in dextrose 5 % 50 mL IVPB Q24H    clopidogrel tablet 75 mg Daily    dextrose 50% injection 12.5 g PRN    dextrose 50% injection 25 g PRN    DOBUtamine 1000 mg in D5W 250 mL infusion (premix non-titrating) Continuous    furosemide injection 80 mg TID    glucagon (human recombinant) injection 1 mg PRN    glucose chewable tablet 16 g PRN    glucose chewable tablet 24 g PRN    heparin 25,000 units in dextrose 5% (100 units/ml) IV bolus from bag - ADDITIONAL PRN BOLUS - 30 units/kg (max bolus 4000 units) PRN    heparin 25,000 units in dextrose 5% (100 units/ml) IV bolus from bag - ADDITIONAL PRN BOLUS - 60 units/kg (max bolus 4000 units) PRN    heparin 25,000 units in dextrose 5% 250 mL (100 units/mL) infusion LOW INTENSITY nomogram - [facility] Continuous    hydrALAZINE tablet 100 mg BID    insulin aspart U-100 pen 1-10 Units QID (AC + HS) PRN    insulin detemir U-100 pen 15 Units BID    isosorbide dinitrate tablet 20 mg TID    methylPREDNISolone sodium succinate (SOLU-MEDROL) 40 mg/mL injection     metoprolol succinate (TOPROL-XL) 24 hr tablet 50 mg Daily    pantoprazole EC tablet 40 mg Daily    prazosin capsule 1 mg BID    ramelteon tablet 8 mg Nightly PRN    rosuvastatin tablet 40 mg QHS    sertraline tablet 50 mg Daily       Objective:      Vital Signs (Most Recent):  Temp: 98 °F (36.7 °C) (08/05/19 0847)  Pulse: 93 (08/05/19 0946)  Resp: 16 (08/05/19 0847)  BP: (!) 151/67 (08/05/19 0847)  SpO2: 97 % (08/05/19 0847)  O2 Device (Oxygen Therapy): High Flow nasal Cannula (08/05/19 0818) Vital Signs (24h Range):  Temp:  [96.2 °F (35.7 °C)-98 °F (36.7 °C)] 98 °F (36.7 °C)  Pulse:  [] 93  Resp:  [16-20] 16  SpO2:  [88 %-98 %] 97 %  BP: (123-151)/(58-67) 151/67     Weight: 97 kg (213 lb 13.5 oz) (08/05/19 0114)  Body mass index is 31.58 kg/m².  Body surface area is 2.17 meters squared.    I/O last 3 completed shifts:  In: 2028.9 [P.O.:768; I.V.:410.9; IV Piggyback:850]  Out: 1850 [Urine:1850]    Physical Exam   Constitutional: He is oriented to person, place, and time. He appears well-developed. He is cooperative.   HENT:   Head: Normocephalic.   Nose: No rhinorrhea.   Mouth/Throat: Mucous membranes are normal. No oropharyngeal exudate.   Eyes: Pupils are equal, round, and reactive to light.   Neck: No thyroid mass present.   Cardiovascular: Normal rate, regular rhythm, S1 normal, S2 normal and intact distal pulses.   Pulmonary/Chest: Effort normal. No respiratory distress. He has no wheezes.   Abdominal: Soft. Bowel sounds are normal. He exhibits no distension. There is no tenderness. No hernia.   Lymphadenopathy:     He has no cervical adenopathy.   Neurological: He is alert and oriented to person, place, and time.   Skin: Skin is warm and dry.       Significant Labs:  Lab Results   Component Value Date    CREATININE 3.4 (H) 08/05/2019    BUN 43 (H) 08/05/2019     08/05/2019    K 3.7 08/05/2019     08/05/2019    CO2 25 08/05/2019     Lab Results   Component Value Date    CALCIUM 9.0 08/05/2019    PHOS 3.2 08/03/2019     Lab Results   Component Value Date    ALBUMIN 2.7 (L) 08/03/2019     Lab Results   Component Value Date    WBC 6.28 08/05/2019    HGB 9.4 (L) 08/05/2019    HCT 28.3 (L) 08/05/2019    MCV 89 08/05/2019      08/05/2019     Recent Labs   Lab 08/03/19  1636   MG 1.8         Significant Imaging:  Imaging Results          X-Ray Chest AP Portable (Final result)  Result time 08/03/19 11:57:16    Final result by Morales Walsh MD (08/03/19 11:57:16)                 Impression:      Cardiomegaly with pulmonary edema.  Bilateral pleural fluid collections left greater than right with atelectasis or consolidation left lower lobe.      Electronically signed by: Morales Walsh  Date:    08/03/2019  Time:    11:57             Narrative:    EXAMINATION:  XR CHEST AP PORTABLE    CLINICAL HISTORY:  fatigue;    COMPARISON:  05/28/2019    FINDINGS:  Cardiomegaly with by lateral pulmonary congestion.  There is some consolidation atelectatic change in the left lung base with bilateral pleural fluid collections.

## 2019-08-05 NOTE — PLAN OF CARE
Problem: Adult Inpatient Plan of Care  Goal: Plan of Care Review  Outcome: Ongoing (interventions implemented as appropriate)  POC reviewed, verbalized understanding. Pt remained free from falls, fall precautions in place. Pt is NSR on monitor. VSS. Blood glucose monitored. Patty NP notified of blood glucose results. Will continue to monitor. Pt usually wears 2L O2 at home, pt on 15L O2, high flow. No other c/o at this time. PIV intact infusing heparin and dobutamine. PTT monitored. Call bell and personal belongings within reach. Hourly rounding complete. Reminded to call for assistance. Will continue to monitor.

## 2019-08-05 NOTE — PLAN OF CARE
Patient is in agreement for palliative care but he defers the choice of agency to his spouse.  Telephone call to Yodit Ellington.  She chose [facility].  Referral sent via [facility].       08/05/19 6869   Post-Acute Status   Post-Acute Authorization Home Health/Hospice   Post-Acute Placement Status Referrals Sent

## 2019-08-05 NOTE — SUBJECTIVE & OBJECTIVE
Review of Systems   Constitution: Positive for malaise/fatigue.   HENT: Negative.    Eyes: Negative.    Cardiovascular: Positive for dyspnea on exertion.   Respiratory: Positive for shortness of breath.    Endocrine: Negative.    Hematologic/Lymphatic: Negative.    Skin: Negative.    Musculoskeletal: Negative.    Gastrointestinal: Negative.    Genitourinary: Negative.    Neurological: Negative.    Psychiatric/Behavioral: Negative.    Allergic/Immunologic: Negative.      Objective:     Vital Signs (Most Recent):  Temp: 98 °F (36.7 °C) (08/05/19 0847)  Pulse: 100 (08/05/19 1321)  Resp: 18 (08/05/19 1225)  BP: (!) 151/67 (08/05/19 0847)  SpO2: 97 % (08/05/19 1225) Vital Signs (24h Range):  Temp:  [96.7 °F (35.9 °C)-98 °F (36.7 °C)] 98 °F (36.7 °C)  Pulse:  [] 100  Resp:  [16-20] 18  SpO2:  [88 %-98 %] 97 %  BP: (123-151)/(58-67) 151/67     Weight: 97 kg (213 lb 13.5 oz)  Body mass index is 31.58 kg/m².     SpO2: 97 %  O2 Device (Oxygen Therapy): High Flow nasal Cannula      Intake/Output Summary (Last 24 hours) at 8/5/2019 1359  Last data filed at 8/5/2019 0900  Gross per 24 hour   Intake 1298.12 ml   Output 1600 ml   Net -301.88 ml       Lines/Drains/Airways     Peripheral Intravenous Line                 Peripheral IV - Single Lumen 08/03/19 20 G Right Wrist 2 days         Peripheral IV - Single Lumen 08/04/19 1130 Anterior;Distal;Left Forearm 1 day                Physical Exam   Constitutional: He is oriented to person, place, and time. He appears well-developed and well-nourished. No distress.   +conversational dyspnea   HENT:   Head: Normocephalic and atraumatic.   Eyes: Pupils are equal, round, and reactive to light. Right eye exhibits no discharge. Left eye exhibits no discharge.   Neck: Neck supple. JVD present.   Cardiovascular: Normal rate, regular rhythm, S1 normal, S2 normal and normal heart sounds.   No murmur heard.  Pulmonary/Chest: Effort normal. No respiratory distress. He has no wheezes. He  has rales.   Abdominal: Soft. He exhibits no distension. There is no tenderness.   Musculoskeletal: He exhibits no edema.   Neurological: He is alert and oriented to person, place, and time.   Skin: Skin is warm and dry. He is not diaphoretic. No erythema.   Psychiatric: He has a normal mood and affect. His behavior is normal. Thought content normal.   Nursing note and vitals reviewed.      Significant Labs:   CMP   Recent Labs   Lab 08/04/19 0456 08/05/19 0452    137   K 3.9 3.7    100   CO2 26 25   * 324*   BUN 41* 43*   CREATININE 3.2* 3.4*   CALCIUM 8.7 9.0   ANIONGAP 8 12   ESTGFRAFRICA 20* 19*   EGFRNONAA 18* 16*    and CBC   Recent Labs   Lab 08/04/19 0456 08/05/19 0452   WBC 8.79 6.28   HGB 9.5* 9.4*   HCT 29.3* 28.3*    202       Significant Imaging: Echocardiogram:   2D echo with color flow doppler:   Results for orders placed or performed during the hospital encounter of 05/28/19   2D echo with color flow doppler   Result Value Ref Range    QEF 25 (A) 55 - 65    Mitral Valve Regurgitation MILD     Aortic Valve Stenosis MILD (A)     Tricuspid Valve Regurgitation MILD     Narrative    Date of Procedure: 05/28/2019        TEST DESCRIPTION   Technical Quality: This is a technically challenging study. There is poor endocardial definition. This study was performed in conjunction with a 3ml intravenous injection of Optison contrast agent.     Aorta: The aortic root is upper limit of normal, measuring 3.4 cm at sinotubular junction and 3.6 cm at Sinuses of Valsalva. The proximal ascending aorta is normal in size, measuring 3.2 cm across.     Left Atrium: The left atrial volume index is severely enlarged, measuring 59.54 cc/m2.     Left Ventricle: The left ventricle is normal in size, with an end-diastolic diameter of 5.0 cm, and an end-systolic diameter of 4.4 cm. Wall thickness is increased, with the septum measuring 2.2 cm and the posterior wall measuring 1.6 cm across. Relative    wall thickness was increased at 0.64, and the LV mass index was increased at 260.4 g/m2 consistent with concentric left ventricular hypertrophy. The following segments were moderately hypokinetic: mid inferolateral wall, basal inferolateral wall.  The following segments were severely hypokinetic: mid inferior wall.  Left ventricular systolic function appears severely depressed. Visually estimated ejection fraction is 25-30%. The LV Doppler derived stroke volume equals 115.0 ccs.     Diastolic indices: E wave velocity 1.4 m/s, E/A ratio 1.7,  msec., E/e' ratio(lat) 24. Diastolic function is indeterminate.     Right Atrium: The right atrium is normal in size, measuring 4.5 cm in length and 3.5 cm in width in the apical view.     Right Ventricle: The right ventricle is normal in size. Global right ventricular systolic function appears normal. Tricuspid annular plane systolic excursion (TAPSE) is 1.2 cm.     Aortic Valve:  The aortic valve is moderately sclerotic with mildly restricted leaflet mobility. The peak velocity obtained across the aortic valve is 2.55 m/s, which translates to a peak gradient of 26 mmHg. The mean gradient is 16 mmHg. Using a left   ventricular outflow tract diameter of 2.1 cm, a left ventricular outflow tract velocity time integral of 33 cm, and a peak instantaneous transvalvular velocity time integral of 71 cm, the calculated aortic valve area is 1.61 cm2(AVAi is 0.74 cm2/m2),   consistent with mild aortic stenosis.     Mitral Valve:  The mitral valve is mildly sclerotic. The pressure half time is 84 msec. The calculated mitral valve area is 2.62 cm2. There is mild mitral regurgitation. There is mitral annular calcification.     Tricuspid Valve:  The tricuspid valve is normal in structure. There is mild tricuspid regurgitation.     Pulmonary Valve:  The pulmonic valve is not well seen.     IVC: Right atrial pressure as assessed by IVC dynamics is normal at not visualized mmHg.      Intracavitary: There is no evidence of pericardial effusion, intracavity mass, thrombi, or vegetation.         CONCLUSIONS     1 - Severely depressed left ventricular systolic function (EF 25-30%).     2 - Indeterminate LV diastolic function.     3 - Normal right ventricular systolic function .     4 - Mild aortic stenosis, SERA = 1.61 cm2, AVAi = 0.74 cm2/m2, peak velocity = 2.55 m/s, mean gradient = 16 mmHg.     5 - Severe left atrial enlargement.     6 - Concentric hypertrophy.             This document has been electronically    SIGNED BY: Yandel Raman MD On: 05/28/2019 12:41    and X-Ray: CXR: X-Ray Chest 1 View (CXR): No results found for this visit on 08/03/19. and X-Ray Chest PA and Lateral (CXR): No results found for this visit on 08/03/19.

## 2019-08-05 NOTE — NURSING
Report received from MELVIN Ledesma. Will be resuming care over pt. Agree with assessment as charted. Will continue to monitor. No complaints or concerns at this time.

## 2019-08-05 NOTE — PROGRESS NOTES
[facility] -   Nephrology  Progress Note    Patient Name: Carlitos Ellington  MRN: [id number]  Admission Date: 8/3/2019  Hospital Length of Stay: 1 days  Attending Provider: Luis Garibay MD   Primary Care Physician: Raza Uribe MD  Principal Problem:Hypoxemia    Subjective:     HPI: 78 year old male with AS, CKD 4, CHF, CAD, DM2, GERD, h/o CVA presents to [facility] with weakness. Wor-up revealed ADHF, pneumonia, elevated troponin and SNEHA on CKD 4.     Nephrology was consulted to help with patient's renal care while he is admitted at [facility]. I saw and examined patient in his hospital room. Patient reports that he woke up with fever on of admission. He also reports generalized weakness and chronic SOB that is exacerbated by ambulation/exertion. He denies any chest pain, abdominal pain, nausea/vomiting, diarrhea, LE edema, dysuria. He also denies NSAID use, has been using tylenol only. He uses Lasix at home (1 pill per day).     Chart review revealed that patient's baseline creatinine is about 2.5 to 2.7. Patient is currently not seen by nephrology.     Interval History:     8/5/19: patient denies any SOB or pain. He is using urinal for urine collection.         Review of patient's allergies indicates:   Allergen Reactions    Influenza virus vaccines Other (See Comments)     Red streaks up arm    Isosorbide (solution)      Headache    Penicillins Swelling     Current Facility-Administered Medications   Medication Frequency    albuterol-ipratropium 2.5 mg-0.5 mg/3 mL nebulizer solution 3 mL Q6H    amLODIPine tablet 10 mg Daily    aspirin EC tablet 81 mg Daily    azithromycin 500 mg in dextrose 5 % 250 mL IVPB (ready to mix system) Q24H    budesonide nebulizer solution 0.5 mg BID    cefTRIAXone (ROCEPHIN) 1 g in dextrose 5 % 50 mL IVPB Q24H    clopidogrel tablet 75 mg Daily    dextrose 50% injection 12.5 g PRN    dextrose 50% injection 25 g PRN    DOBUtamine 1000 mg in D5W 250 mL infusion (premix  non-titrating) Continuous    furosemide injection 80 mg TID    glucagon (human recombinant) injection 1 mg PRN    glucose chewable tablet 16 g PRN    glucose chewable tablet 24 g PRN    heparin 25,000 units in dextrose 5% (100 units/ml) IV bolus from bag - ADDITIONAL PRN BOLUS - 30 units/kg (max bolus 4000 units) PRN    heparin 25,000 units in dextrose 5% (100 units/ml) IV bolus from bag - ADDITIONAL PRN BOLUS - 60 units/kg (max bolus 4000 units) PRN    heparin 25,000 units in dextrose 5% 250 mL (100 units/mL) infusion LOW INTENSITY nomogram - [facility] Continuous    hydrALAZINE tablet 100 mg BID    insulin aspart U-100 pen 1-10 Units QID (AC + HS) PRN    insulin detemir U-100 pen 15 Units BID    isosorbide dinitrate tablet 20 mg TID    methylPREDNISolone sodium succinate (SOLU-MEDROL) 40 mg/mL injection     metoprolol succinate (TOPROL-XL) 24 hr tablet 50 mg Daily    pantoprazole EC tablet 40 mg Daily    prazosin capsule 1 mg BID    ramelteon tablet 8 mg Nightly PRN    rosuvastatin tablet 40 mg QHS    sertraline tablet 50 mg Daily       Objective:     Vital Signs (Most Recent):  Temp: 98 °F (36.7 °C) (08/05/19 0847)  Pulse: 93 (08/05/19 0946)  Resp: 16 (08/05/19 0847)  BP: (!) 151/67 (08/05/19 0847)  SpO2: 97 % (08/05/19 0847)  O2 Device (Oxygen Therapy): High Flow nasal Cannula (08/05/19 0818) Vital Signs (24h Range):  Temp:  [96.2 °F (35.7 °C)-98 °F (36.7 °C)] 98 °F (36.7 °C)  Pulse:  [] 93  Resp:  [16-20] 16  SpO2:  [88 %-98 %] 97 %  BP: (123-151)/(58-67) 151/67     Weight: 97 kg (213 lb 13.5 oz) (08/05/19 0114)  Body mass index is 31.58 kg/m².  Body surface area is 2.17 meters squared.    I/O last 3 completed shifts:  In: 2028.9 [P.O.:768; I.V.:410.9; IV Piggyback:850]  Out: 1850 [Urine:1850]    Physical Exam   Constitutional: He is oriented to person, place, and time. He appears well-developed. He is cooperative.   HENT:   Head: Normocephalic.   Nose: No rhinorrhea.   Mouth/Throat: Mucous  membranes are normal. No oropharyngeal exudate.   Eyes: Pupils are equal, round, and reactive to light.   Neck: No thyroid mass present.   Cardiovascular: Normal rate, regular rhythm, S1 normal, S2 normal and intact distal pulses.   Pulmonary/Chest: Effort normal. No respiratory distress. He has no wheezes.   Abdominal: Soft. Bowel sounds are normal. He exhibits no distension. There is no tenderness. No hernia.   Lymphadenopathy:     He has no cervical adenopathy.   Neurological: He is alert and oriented to person, place, and time.   Skin: Skin is warm and dry.       Significant Labs:  Lab Results   Component Value Date    CREATININE 3.4 (H) 08/05/2019    BUN 43 (H) 08/05/2019     08/05/2019    K 3.7 08/05/2019     08/05/2019    CO2 25 08/05/2019     Lab Results   Component Value Date    CALCIUM 9.0 08/05/2019    PHOS 3.2 08/03/2019     Lab Results   Component Value Date    ALBUMIN 2.7 (L) 08/03/2019     Lab Results   Component Value Date    WBC 6.28 08/05/2019    HGB 9.4 (L) 08/05/2019    HCT 28.3 (L) 08/05/2019    MCV 89 08/05/2019     08/05/2019     Recent Labs   Lab 08/03/19  1636   MG 1.8         Significant Imaging:  Imaging Results          X-Ray Chest AP Portable (Final result)  Result time 08/03/19 11:57:16    Final result by Morales Walsh MD (08/03/19 11:57:16)                 Impression:      Cardiomegaly with pulmonary edema.  Bilateral pleural fluid collections left greater than right with atelectasis or consolidation left lower lobe.      Electronically signed by: Morales Walsh  Date:    08/03/2019  Time:    11:57             Narrative:    EXAMINATION:  XR CHEST AP PORTABLE    CLINICAL HISTORY:  fatigue;    COMPARISON:  05/28/2019    FINDINGS:  Cardiomegaly with by lateral pulmonary congestion.  There is some consolidation atelectatic change in the left lung base with bilateral pleural fluid collections.                                  Assessment/Plan:     Pneumonia left lower  lobe  Continue antibiotics.     Acute on chronic combined systolic and diastolic heart failure  Continue Lasix diuresis.     Chronic kidney disease, stage IV (severe)  Patient has h/o CKD 4 with baseline creatinine of about 2.5 to 2.7. Creatinine has now increased to 3.4 on 8/5/19. SNEHA likely multifactorial and related to infection (pneumonia) and mild ADHF (pulmonary edema on CXR). Will treat pneumonia and continue Lasix dose to 80 mg IV tid. UOP of about 1.8 liters since admission.   No need for dialysis at present. Urinalysis shows chronic proteinuria, no hematuria or increased WBC. Renal US shoes no signs of obstruction. Avoid NSAIDs, ARB, ACE-I.         Thank you for your consult. I will follow-up with patient. Please contact us if you have any additional questions.    Raj Acharya MD  Nephrology  [facility]

## 2019-08-05 NOTE — PROGRESS NOTES
[facility] Hospital Medicine  Progress Note    Patient Name: Carlitos Ellington  MRN: [id number]  Patient Class: IP- Inpatient   Admission Date: 8/3/2019  Length of Stay: 1 days  Attending Physician: Luis Garibay MD  Primary Care Provider: Raza Uribe MD        Subjective:     Principal Problem:Hypoxemia      HPI:  Carlitos Ellington is a 78 y.o. male patient with a PMHx of CABG, CVA, CAD, CHF, DM, GERD, HTN, HLD, and mild AI who presents to the Emergency Department for evaluation of generalized weakness which onset suddenly when he woke up this morning. Pt denies SOB at this time, but wife states he seems SOB. Symptoms are constant and moderate in severity. No exacerbating factors reported. Associated sxs include fatigue and fever. Pt is on continuous oxygen at 2L NC. Patient denies any CP, abd pain, hematuria, dysuria, blood in stool, difficulty urinating, frequency, n/v/d, leg swelling, palpitations, and all other sxs at this time.  Per EMS pt's O2 sats dropped and was placed on 4L NC. Wife gave pt 2 tylenol x1 hour PTA.  ED work-up resulted h/h of 9.8/29.9, BUN 35, Creatinine 2.8, BNP 1711, troponin 0.242, ABG: PO2 55, HCO3 22.7 on  2L NC. CXR notes pulmonary edema.  Bilateral pleural fluid collections left greater than right with atelectasis or consolidation left lower lobe. Hospital medicine called for admission. Patient is a DNR.     Overview/Hospital Course:  8/4/19 -  Pt is seen at bedside . Expressed that he would like to see a Pulmonologist while in the hospital . He is O2 dependent at home at 2L/min. Currently requiring high flow O2 via NC. Denies chest pain but report some SOB and subjective fever . Tmax 99.2 since admission.     CT chest done this morning . Report is reviewed .Positive emphysematous changes are suggested . Pt is a former smoker.    Cardiology and Nephrology consults were obtained     8/5  Patient with continued decline. Pulmonary consulted - Emphysema, Cardiology  Consulted - On dobutamine GTT - NSTEMI, Nephrology on Board. Discussed multiple comorbidities and the apprpriatness of Palliative Care. Patient understood and has accepted Hospice Referral. CM at bedside. POA selecting [facility]. Awaiting consult. Patient denies CP + SOB. Plan for Inpatient placement with transition to home as appropriate.    Interval History: Tolerating therapies. Hospice Care elected. Await consult    Review of Systems   Constitutional: Positive for activity change, appetite change, chills, fatigue and fever.   HENT: Negative for sore throat.    Eyes: Negative for visual disturbance.   Respiratory: Positive for shortness of breath. Negative for cough and chest tightness.    Cardiovascular: Positive for leg swelling. Negative for chest pain and palpitations.   Gastrointestinal: Negative for abdominal distention, abdominal pain, constipation, diarrhea, nausea and vomiting.   Endocrine: Negative for polyuria.   Genitourinary: Negative for decreased urine volume, dysuria, flank pain, frequency and hematuria.   Musculoskeletal: Negative for back pain and gait problem.   Skin: Negative for rash.   Neurological: Negative for syncope, speech difficulty, weakness, light-headedness and headaches.   Psychiatric/Behavioral: Negative for confusion, hallucinations and sleep disturbance.     Objective:     Vital Signs (Most Recent):  Temp: 98 °F (36.7 °C) (08/05/19 1626)  Pulse: 94 (08/05/19 1626)  Resp: 16 (08/05/19 1626)  BP: 135/63 (08/05/19 1626)  SpO2: 95 % (08/05/19 1626) Vital Signs (24h Range):  Temp:  [96.7 °F (35.9 °C)-98.2 °F (36.8 °C)] 98 °F (36.7 °C)  Pulse:  [] 94  Resp:  [16-20] 16  SpO2:  [88 %-98 %] 95 %  BP: (126-151)/(60-67) 135/63     Weight: 97 kg (213 lb 13.5 oz)  Body mass index is 31.58 kg/m².    Intake/Output Summary (Last 24 hours) at 8/5/2019 1735  Last data filed at 8/5/2019 1500  Gross per 24 hour   Intake 595.32 ml   Output 1375 ml   Net -779.68 ml      Physical Exam    Constitutional: He is oriented to person, place, and time. No distress.   HENT:   Head: Normocephalic and atraumatic.   Eyes: EOM are normal.   Neck: Normal range of motion. Neck supple.   Cardiovascular: Normal rate and regular rhythm.   Murmur heard.   Systolic murmur is present with a grade of 3/6.  Pulmonary/Chest: No respiratory distress. He has wheezes in the right middle field, the right lower field and the left middle field. He has rhonchi in the right middle field, the right lower field and the left middle field. He has rales.   Abdominal: Soft. Bowel sounds are normal. There is no guarding.   Musculoskeletal: He exhibits edema.   Neurological: He is alert and oriented to person, place, and time.   Skin: Skin is warm and dry. Capillary refill takes 2 to 3 seconds.       Significant Labs:   BMP:   Recent Labs   Lab 08/05/19 0452   *      K 3.7      CO2 25   BUN 43*   CREATININE 3.4*   CALCIUM 9.0     CBC:   Recent Labs   Lab 08/04/19 0456 08/05/19 0452   WBC 8.79 6.28   HGB 9.5* 9.4*   HCT 29.3* 28.3*    202     CMP:   Recent Labs   Lab 08/04/19 0456 08/05/19 0452    137   K 3.9 3.7    100   CO2 26 25   * 324*   BUN 41* 43*   CREATININE 3.2* 3.4*   CALCIUM 8.7 9.0   ANIONGAP 8 12   EGFRNONAA 18* 16*     All pertinent labs within the past 24 hours have been reviewed.    Significant Imaging: I have reviewed all pertinent imaging results/findings within the past 24 hours.      Assessment/Plan:      * Acute on Chronic hypoxic respiratory failure   2/2 acute heart failure exacerbation.    IV diuresis        Continuous oxygen   8/4/19-  Pt is requiring high flow O2 via NC at 15L/min to maintain satisfactory SpO2  Wean FiO2 as tolerated   Increase  IV Diuresis   Continue Antibiotic (Rocephin and Azithromycin )  Add Solumedrol IV short course   Continue bronchodilators   8/5  10 L NC  Abx  Steroids  Pulmonary CC on consult      Panlobular emphysema  Comfort  Care  Hospice Referral in progress  Steroids  ABX de-escalated  Pulmonary         Elevated troponin  Elevated troponin likely due to demand ischemia/renal failure and CHF   Denies any chest pain   Initial troponin 0.242  Serial troponin's   No acute EKG changes   Cardiology following recommends heparin gtt and rule out PE    Heparin gtt initiated   VQ scan to r/o PE   8/4/19-  Cardiology is following     8/5  Likely Demand Ischemia  NSTEMI POA  Heparin Gtt                Acute on chronic combined systolic and diastolic heart failure  IV diuresis and  Fluid/Sodium restriction.    Daily weights and strict I&O.    Cardiology following   Continue ASA, BB, Statin    2d echo    Troponin 0.242  Serial troponin's    No acute EKG changes  8/4/19-  Cardiology consult obtained .  Pt is started on Dobutamine drip.  Continue IV diuresis     8/5  Dobutamine Gtt infusing  Hospice Consult  Cardiology  Hep Gtt  Diuresis  BB    CAD, multiple vessel  Continue ASA, plavix, Imdur, statin  No ACEi/ARB given baseline renal function  Cardiology following       ACC/AHA stage C congestive heart failure due to ischemic cardiomyopathy  8/5  Diuresis  Cardiology  Dobutamine Gtt  No ACE or ARB due to renal failure      Chronic kidney disease, stage IV (severe)  Creatinine 2.8 (Baseline 2.3-2.7)  IV diuresis   Careful fluid management.    Monitor urine output and renal function chemistries.    Nephrology consult pending course   8/4/19 -  Nephrology consult obtained     8/5  Worsening  Monitor  Likely related to diuresis  Comfort Care - Hospice placement pending    Carotid stenosis  Continue Plavix       Hyperlipidemia associated with type 2 diabetes mellitus  Continue Statin       Hypertension associated with diabetes  Continue hydralazine, prazosin, and amlodipine  Monitor          VTE Risk Mitigation (From admission, onward)        Ordered     heparin 25,000 units in dextrose 5% (100 units/ml) IV bolus from bag - ADDITIONAL PRN BOLUS - 60  units/kg (max bolus 4000 units)  As needed (PRN)      08/04/19 0317     heparin 25,000 units in dextrose 5% (100 units/ml) IV bolus from bag - ADDITIONAL PRN BOLUS - 30 units/kg (max bolus 4000 units)  As needed (PRN)      08/04/19 0317     heparin 25,000 units in dextrose 5% 250 mL (100 units/mL) infusion LOW INTENSITY nomogram - [facility]  Continuous      08/03/19 1455     IP VTE HIGH RISK PATIENT  Once      08/03/19 1403                Luis Garibay MD  Department of Hospital Medicine   [facility]

## 2019-08-05 NOTE — ASSESSMENT & PLAN NOTE
IV diuresis and  Fluid/Sodium restriction.    Daily weights and strict I&O.    Cardiology following   Continue ASA, BB, Statin    2d echo    Troponin 0.242  Serial troponin's    No acute EKG changes  8/4/19-  Cardiology consult obtained .  Pt is started on Dobutamine drip.  Continue IV diuresis     8/5  Dobutamine Gtt infusing  Hospice Consult  Cardiology  Hep Gtt  Diuresis  BB

## 2019-08-05 NOTE — ASSESSMENT & PLAN NOTE
Elevated troponin likely due to demand ischemia/renal failure and CHF   Denies any chest pain   Initial troponin 0.242  Serial troponin's   No acute EKG changes   Cardiology following recommends heparin gtt and rule out PE    Heparin gtt initiated   VQ scan to r/o PE   8/4/19-  Cardiology is following     8/5  Likely Demand Ischemia  NSTEMI POA  Heparin Gtt

## 2019-08-05 NOTE — ASSESSMENT & PLAN NOTE
Creatinine 2.8 (Baseline 2.3-2.7)  IV diuresis   Careful fluid management.    Monitor urine output and renal function chemistries.    Nephrology consult pending course   8/4/19 -  Nephrology consult obtained     8/5  Worsening  Monitor  Likely related to diuresis  Comfort Care - Hospice placement pending

## 2019-08-05 NOTE — ASSESSMENT & PLAN NOTE
8/4 Very small effusions, consider fluid restriction cautious diuresis   8/5 IV Lasix 80 mg Q 8 hr.

## 2019-08-05 NOTE — NURSING
Chart reviewed for diabetes  Patient was seen by this nurse on recent admit  See note on 5-29-19  No further action unless diabetes educational needs are identified

## 2019-08-06 VITALS
SYSTOLIC BLOOD PRESSURE: 137 MMHG | HEIGHT: 69 IN | BODY MASS INDEX: 29.65 KG/M2 | HEART RATE: 74 BPM | TEMPERATURE: 98 F | RESPIRATION RATE: 18 BRPM | WEIGHT: 200.19 LBS | OXYGEN SATURATION: 99 % | DIASTOLIC BLOOD PRESSURE: 65 MMHG

## 2019-08-06 PROBLEM — Z51.5 COMFORT MEASURES ONLY STATUS: Status: ACTIVE | Noted: 2019-08-06

## 2019-08-06 LAB
ANION GAP SERPL CALC-SCNC: 12 MMOL/L (ref 8–16)
APTT BLDCRRT: 101.3 SEC (ref 21–32)
APTT BLDCRRT: 106.1 SEC (ref 21–32)
BASOPHILS # BLD AUTO: 0 K/UL (ref 0–0.2)
BASOPHILS NFR BLD: 0 % (ref 0–1.9)
BUN SERPL-MCNC: 48 MG/DL (ref 8–23)
CALCIUM SERPL-MCNC: 9.2 MG/DL (ref 8.7–10.5)
CHLORIDE SERPL-SCNC: 100 MMOL/L (ref 95–110)
CO2 SERPL-SCNC: 28 MMOL/L (ref 23–29)
CREAT SERPL-MCNC: 3.5 MG/DL (ref 0.5–1.4)
DIFFERENTIAL METHOD: ABNORMAL
EOSINOPHIL # BLD AUTO: 0 K/UL (ref 0–0.5)
EOSINOPHIL NFR BLD: 0 % (ref 0–8)
ERYTHROCYTE [DISTWIDTH] IN BLOOD BY AUTOMATED COUNT: 15 % (ref 11.5–14.5)
EST. GFR  (AFRICAN AMERICAN): 18 ML/MIN/1.73 M^2
EST. GFR  (NON AFRICAN AMERICAN): 16 ML/MIN/1.73 M^2
GLUCOSE SERPL-MCNC: 232 MG/DL (ref 70–110)
HCT VFR BLD AUTO: 29.2 % (ref 40–54)
HGB BLD-MCNC: 9.6 G/DL (ref 14–18)
LYMPHOCYTES # BLD AUTO: 0.9 K/UL (ref 1–4.8)
LYMPHOCYTES NFR BLD: 7.2 % (ref 18–48)
MCH RBC QN AUTO: 29.5 PG (ref 27–31)
MCHC RBC AUTO-ENTMCNC: 32.9 G/DL (ref 32–36)
MCV RBC AUTO: 90 FL (ref 82–98)
MONOCYTES # BLD AUTO: 0.9 K/UL (ref 0.3–1)
MONOCYTES NFR BLD: 7.3 % (ref 4–15)
NEUTROPHILS # BLD AUTO: 10.6 K/UL (ref 1.8–7.7)
NEUTROPHILS NFR BLD: 85.5 % (ref 38–73)
PLATELET # BLD AUTO: 248 K/UL (ref 150–350)
PMV BLD AUTO: 10 FL (ref 9.2–12.9)
POCT GLUCOSE: 178 MG/DL (ref 70–110)
POCT GLUCOSE: 212 MG/DL (ref 70–110)
POTASSIUM SERPL-SCNC: 3.7 MMOL/L (ref 3.5–5.1)
RBC # BLD AUTO: 3.25 M/UL (ref 4.6–6.2)
SODIUM SERPL-SCNC: 140 MMOL/L (ref 136–145)
WBC # BLD AUTO: 12.41 K/UL (ref 3.9–12.7)

## 2019-08-06 PROCEDURE — 63600175 PHARM REV CODE 636 W HCPCS: Mod: HCNC | Performed by: INTERNAL MEDICINE

## 2019-08-06 PROCEDURE — 85025 COMPLETE CBC W/AUTO DIFF WBC: CPT | Mod: HCNC

## 2019-08-06 PROCEDURE — 99231 PR SUBSEQUENT HOSPITAL CARE,LEVL I: ICD-10-PCS | Mod: HCNC,,, | Performed by: INTERNAL MEDICINE

## 2019-08-06 PROCEDURE — 99233 PR SUBSEQUENT HOSPITAL CARE,LEVL III: ICD-10-PCS | Mod: HCNC,,, | Performed by: INTERNAL MEDICINE

## 2019-08-06 PROCEDURE — 27100171 HC OXYGEN HIGH FLOW UP TO 24 HOURS: Mod: HCNC

## 2019-08-06 PROCEDURE — 99900035 HC TECH TIME PER 15 MIN (STAT): Mod: HCNC

## 2019-08-06 PROCEDURE — 85730 THROMBOPLASTIN TIME PARTIAL: CPT | Mod: HCNC

## 2019-08-06 PROCEDURE — 36415 COLL VENOUS BLD VENIPUNCTURE: CPT | Mod: HCNC

## 2019-08-06 PROCEDURE — 25000242 PHARM REV CODE 250 ALT 637 W/ HCPCS: Mod: HCNC | Performed by: INTERNAL MEDICINE

## 2019-08-06 PROCEDURE — 99231 SBSQ HOSP IP/OBS SF/LOW 25: CPT | Mod: HCNC,,, | Performed by: INTERNAL MEDICINE

## 2019-08-06 PROCEDURE — 99233 SBSQ HOSP IP/OBS HIGH 50: CPT | Mod: HCNC,,, | Performed by: INTERNAL MEDICINE

## 2019-08-06 PROCEDURE — 94761 N-INVAS EAR/PLS OXIMETRY MLT: CPT | Mod: HCNC

## 2019-08-06 PROCEDURE — 25000003 PHARM REV CODE 250: Mod: HCNC | Performed by: NURSE PRACTITIONER

## 2019-08-06 PROCEDURE — 80048 BASIC METABOLIC PNL TOTAL CA: CPT | Mod: HCNC

## 2019-08-06 PROCEDURE — 85730 THROMBOPLASTIN TIME PARTIAL: CPT | Mod: 91,HCNC

## 2019-08-06 PROCEDURE — 94640 AIRWAY INHALATION TREATMENT: CPT | Mod: HCNC

## 2019-08-06 RX ADMIN — METOPROLOL SUCCINATE 50 MG: 50 TABLET, EXTENDED RELEASE ORAL at 08:08

## 2019-08-06 RX ADMIN — INSULIN ASPART 2 UNITS: 100 INJECTION, SOLUTION INTRAVENOUS; SUBCUTANEOUS at 11:08

## 2019-08-06 RX ADMIN — IPRATROPIUM BROMIDE AND ALBUTEROL SULFATE 3 ML: .5; 3 SOLUTION RESPIRATORY (INHALATION) at 01:08

## 2019-08-06 RX ADMIN — PRAZOSIN HYDROCHLORIDE 1 MG: 1 CAPSULE ORAL at 08:08

## 2019-08-06 RX ADMIN — SERTRALINE HYDROCHLORIDE 50 MG: 50 TABLET ORAL at 08:08

## 2019-08-06 RX ADMIN — CLOPIDOGREL BISULFATE 75 MG: 75 TABLET ORAL at 08:08

## 2019-08-06 RX ADMIN — ISOSORBIDE DINITRATE 20 MG: 10 TABLET ORAL at 08:08

## 2019-08-06 RX ADMIN — INSULIN ASPART 4 UNITS: 100 INJECTION, SOLUTION INTRAVENOUS; SUBCUTANEOUS at 06:08

## 2019-08-06 RX ADMIN — AMLODIPINE BESYLATE 10 MG: 10 TABLET ORAL at 08:08

## 2019-08-06 RX ADMIN — BUDESONIDE 0.5 MG: 0.5 SUSPENSION RESPIRATORY (INHALATION) at 08:08

## 2019-08-06 RX ADMIN — FUROSEMIDE 80 MG: 10 INJECTION, SOLUTION INTRAMUSCULAR; INTRAVENOUS at 08:08

## 2019-08-06 RX ADMIN — PANTOPRAZOLE SODIUM 40 MG: 40 TABLET, DELAYED RELEASE ORAL at 08:08

## 2019-08-06 RX ADMIN — ASPIRIN 81 MG: 81 TABLET, COATED ORAL at 08:08

## 2019-08-06 RX ADMIN — IPRATROPIUM BROMIDE AND ALBUTEROL SULFATE 3 ML: .5; 3 SOLUTION RESPIRATORY (INHALATION) at 08:08

## 2019-08-06 RX ADMIN — HYDRALAZINE HYDROCHLORIDE 100 MG: 50 TABLET ORAL at 08:08

## 2019-08-06 NOTE — PHYSICIAN QUERY
PT Name: Carlitos Ellington  MR #: [id number]     Physician Query Form - Documentation Clarification      CDS/: Kylie Wood               Contact information:[email or web address]    This form is a permanent document in the medical record.     Query Date: August 6, 2019    By submitting this query, we are merely seeking further clarification of documentation. Please utilize your independent clinical judgment when addressing the question(s) below.    The Medical record reflects the following:    Supporting Clinical Findings Location in Medical Record     Hypertension associated with diabetes   Continue hydralazine, prazosin, and amlodipine   Monitor        H&P 8/3     Diabetes mellitus with no complication     Type II diabetes mellitus with renal manifestations     Hyperlipidemia associated with type 2 diabetes mellitus    Hypertension associated with diabetes             H&P 8/3 (PMH)                                                                            Doctor, Please specify diagnosis associated with above clinical findings.    Provider Use Only      [ x  ]  HTN is a manifestation of DM    [   ]  HTN is not a manifestation of DM    [   ]  Other:______________________                                                                                                               [  ] Clinically Undetermined

## 2019-08-06 NOTE — ASSESSMENT & PLAN NOTE
Patient has h/o CKD 4 with baseline creatinine of about 2.5 to 2.7. Creatinine has now increased to 3.5 on 8/5/19. SNEHA likely multifactorial and related to infection (pneumonia) and mild ADHF (pulmonary edema on CXR). Will treat pneumonia and continue Lasix dose to 80 mg IV tid. UOP of about 1.8 liters over past 24 hours.   No need for dialysis at present. Urinalysis shows chronic proteinuria, no hematuria or increased WBC. Renal US shoes no signs of obstruction. Avoid NSAIDs, ARB, ACE-I.

## 2019-08-06 NOTE — PROGRESS NOTES
[facility]  Pulmonology  Progress Note    Patient Name: Carlitos Ellington  MRN: [id number]  Admission Date: 8/3/2019  Hospital Length of Stay: 2 days  Code Status: DNR  Attending Provider: No att. providers found  Primary Care Provider: Raza Uribe MD   Principal Problem: Hypoxemia    Subjective:   Seen and examined.  On O2 5 L O2 sat 95%.  On 2 sat 95% on 5 L via nasal cannula.  Interval History:   Review of Systems   Constitutional: Positive for malaise/fatigue.   HENT: Negative for nosebleeds.    Eyes: Negative for discharge and redness.   Cardiovascular: Positive for leg swelling.   Gastrointestinal: Negative for abdominal pain.   Genitourinary: Negative for hematuria.   Musculoskeletal: Positive for back pain and joint pain.   Skin: Negative for rash.   Neurological: Negative for seizures.   Endo/Heme/Allergies: Bruises/bleeds easily.   Psychiatric/Behavioral: Negative for substance abuse.         Objective:     Vital Signs (Most Recent):  Temp: 98.3 °F (36.8 °C) (08/06/19 1107)  Pulse: 74 (08/06/19 1329)  Resp: 18 (08/06/19 1300)  BP: 137/65 (08/06/19 1107)  SpO2: 99 % (08/06/19 1300) Vital Signs (24h Range):  Temp:  [97.6 °F (36.4 °C)-98.3 °F (36.8 °C)] 98.3 °F (36.8 °C)  Pulse:  [73-96] 74  Resp:  [16-20] 18  SpO2:  [90 %-99 %] 99 %  BP: (135-162)/(63-71) 137/65     Weight: 90.8 kg (200 lb 2.8 oz)  Body mass index is 29.56 kg/m².      Intake/Output Summary (Last 24 hours) at 8/6/2019 1426  Last data filed at 8/6/2019 1300  Gross per 24 hour   Intake 1457.41 ml   Output 2800 ml   Net -1342.59 ml       Physical Exam   Constitutional: He is oriented to person, place, and time. He appears well-developed and well-nourished. No distress.   HENT:   Head: Normocephalic and atraumatic.   Eyes: EOM are normal.   Neck: Neck supple.   Cardiovascular: Normal rate.   Murmur heard.  Pulmonary/Chest: Effort normal. No respiratory distress. He has rales.   Abdominal: Soft. There is no tenderness.    Musculoskeletal: He exhibits edema.   Neurological: He is alert and oriented to person, place, and time.   Skin: Rash noted.   Psychiatric: He has a normal mood and affect.   Nursing note and vitals reviewed.      Vents:  Oxygen Concentration (%): 40 (08/06/19 1300)    Lines/Drains/Airways          None          Significant Labs:    CBC/Anemia Profile:  Recent Labs   Lab 08/05/19  0452 08/06/19  0549   WBC 6.28 12.41   HGB 9.4* 9.6*   HCT 28.3* 29.2*    248   MCV 89 90   RDW 14.7* 15.0*        Chemistries:  Recent Labs   Lab 08/05/19  0452 08/06/19  0549    140   K 3.7 3.7    100   CO2 25 28   BUN 43* 48*   CREATININE 3.4* 3.5*   CALCIUM 9.0 9.2     Significant Imaging:  CT chest   Emphysematous changes with questionable pulmonary congestion.  Bibasilar atelectatic change with bilateral pleural fluid collections.  Suspect small loculated component of pleural fluid along the left lateral chest wall.  8 mm pulmonary nodule right upper lobe      V/Q scan August 3, 2019 negative for pulmonary embolism     2D echo August 3, 2019  Emphysematous changes with questionable pulmonary congestion.  Bibasilar atelectatic change with bilateral pleural fluid collections.  Suspect small loculated component of pleural fluid along the left lateral chest wall.  8 mm pulmonary nodule right upper lobe      ABG  Recent Labs   Lab 08/03/19  1146   PH 7.408   PO2 55*   PCO2 36.0   HCO3 22.7*   BE -2     Assessment/Plan:     * Acute on Chronic hypoxic respiratory failure   8/4 supplemental oxygen  8/5 wean O2 as tolerated.  Diuresis.  8/6 same    Panlobular emphysema  8/4 Start jet nebs  8/5 O2 keep sat above 90%.  Nebs p.r.n.  8/6 O2 keep sat above 90% nebs p.r.n..    Chronic kidney disease, stage IV (severe)  Strict I&Os.  Monitor BMP.  Monitor urine output.  On IV Lasix 80 mg 3 times daily.  8/6 diuresis p.o.     Plan to discharge home with hospice.     Lloyd Brooks MD  Pulmonology  [facility]

## 2019-08-06 NOTE — ASSESSMENT & PLAN NOTE
Strict I&Os.  Monitor BMP.  Monitor urine output.  On IV Lasix 80 mg 3 times daily.  8/6 diuresis p.o.

## 2019-08-06 NOTE — PLAN OF CARE
Problem: Adult Inpatient Plan of Care  Goal: Plan of Care Review  Outcome: Ongoing (interventions implemented as appropriate)  Pt AAOx4. VSS. Pt remained free of falls this shift. No complaints of pain or discomfort. Medications administered as ordered. Pt is NSR on monitor. PIV intact, infusing heparin and dobutamine. Oxygen therapy continued. Hourly rounding completed. Pt instructed to call for assistance. POC reviewed. Pt verbalized understanding. Will continue to monitor.

## 2019-08-06 NOTE — ASSESSMENT & PLAN NOTE
Cont hep drip  Likely sec to demand ischemia  May need repeat ischemic workup if none recently but discuss palliative approach with pt and fam    8/5/19  -Stable overnight, no chest pain  -Review of records in care everywhere reportedly shows patient refused [facility] in the past due to concerning of contrast associated nephropathy  -Continue ASA, BB, statin  -Can heparanize for 24-48 hours    8/6/19  -Troponin trending down  -Remains chest pain free  -Continue ASA, BB, statin  -Stop heparin gtt

## 2019-08-06 NOTE — SUBJECTIVE & OBJECTIVE
Review of Systems   Constitution: Positive for malaise/fatigue.   Cardiovascular: Positive for dyspnea on exertion (improved).   Respiratory: Positive for shortness of breath (improved).    Endocrine: Negative.    Hematologic/Lymphatic: Negative.    Skin: Negative.    Musculoskeletal: Negative.    Gastrointestinal: Negative.    Genitourinary: Negative.    Neurological: Positive for weakness.   Psychiatric/Behavioral: Negative.    Allergic/Immunologic: Negative.      Objective:     Vital Signs (Most Recent):  Temp: 98 °F (36.7 °C) (08/06/19 0829)  Pulse: 86 (08/06/19 0829)  Resp: 16 (08/06/19 0829)  BP: (!) 153/69 (08/06/19 0829)  SpO2: (!) 90 % (08/06/19 0829) Vital Signs (24h Range):  Temp:  [97.6 °F (36.4 °C)-98.2 °F (36.8 °C)] 98 °F (36.7 °C)  Pulse:  [] 86  Resp:  [16-20] 16  SpO2:  [90 %-99 %] 90 %  BP: (135-162)/(63-71) 153/69     Weight: 90.8 kg (200 lb 2.8 oz)  Body mass index is 29.56 kg/m².     SpO2: (!) 90 %  O2 Device (Oxygen Therapy): High Flow nasal Cannula      Intake/Output Summary (Last 24 hours) at 8/6/2019 0948  Last data filed at 8/6/2019 0900  Gross per 24 hour   Intake 788.6 ml   Output 1600 ml   Net -811.4 ml       Lines/Drains/Airways     Peripheral Intravenous Line                 Peripheral IV - Single Lumen 08/03/19 20 G Right Wrist 3 days         Peripheral IV - Single Lumen 08/06/19 0417 20 G Left Forearm less than 1 day                Physical Exam   Constitutional: He is oriented to person, place, and time. He appears well-developed and well-nourished. No distress.   HENT:   Head: Normocephalic and atraumatic.   Eyes: Pupils are equal, round, and reactive to light. Right eye exhibits no discharge. Left eye exhibits no discharge.   Neck: Neck supple. No JVD present.   Cardiovascular: Normal rate, regular rhythm, S1 normal, S2 normal and normal heart sounds.   No murmur heard.  Pulmonary/Chest: Effort normal. No respiratory distress. He has no wheezes.   Mildly diminished BS at  bases (improved)     Abdominal: Soft. He exhibits no distension.   Musculoskeletal: He exhibits no edema.   Neurological: He is alert and oriented to person, place, and time.   Skin: Skin is warm and dry. He is not diaphoretic. No erythema.   Psychiatric: He has a normal mood and affect. His behavior is normal. Thought content normal.   Nursing note and vitals reviewed.      Significant Labs:   CMP   Recent Labs   Lab 08/05/19  0452 08/06/19  0549    140   K 3.7 3.7    100   CO2 25 28   * 232*   BUN 43* 48*   CREATININE 3.4* 3.5*   CALCIUM 9.0 9.2   ANIONGAP 12 12   ESTGFRAFRICA 19* 18*   EGFRNONAA 16* 16*   , CBC   Recent Labs   Lab 08/05/19  0452 08/06/19  0549   WBC 6.28 12.41   HGB 9.4* 9.6*   HCT 28.3* 29.2*    248   , Troponin No results for input(s): TROPONINI in the last 48 hours. and All pertinent lab results from the last 24 hours have been reviewed.    Significant Imaging: Echocardiogram:   2D echo with color flow doppler:   Results for orders placed or performed during the hospital encounter of 05/28/19   2D echo with color flow doppler   Result Value Ref Range    QEF 25 (A) 55 - 65    Mitral Valve Regurgitation MILD     Aortic Valve Stenosis MILD (A)     Tricuspid Valve Regurgitation MILD     Narrative    Date of Procedure: 05/28/2019        TEST DESCRIPTION   Technical Quality: This is a technically challenging study. There is poor endocardial definition. This study was performed in conjunction with a 3ml intravenous injection of Optison contrast agent.     Aorta: The aortic root is upper limit of normal, measuring 3.4 cm at sinotubular junction and 3.6 cm at Sinuses of Valsalva. The proximal ascending aorta is normal in size, measuring 3.2 cm across.     Left Atrium: The left atrial volume index is severely enlarged, measuring 59.54 cc/m2.     Left Ventricle: The left ventricle is normal in size, with an end-diastolic diameter of 5.0 cm, and an end-systolic diameter of 4.4  cm. Wall thickness is increased, with the septum measuring 2.2 cm and the posterior wall measuring 1.6 cm across. Relative   wall thickness was increased at 0.64, and the LV mass index was increased at 260.4 g/m2 consistent with concentric left ventricular hypertrophy. The following segments were moderately hypokinetic: mid inferolateral wall, basal inferolateral wall.  The following segments were severely hypokinetic: mid inferior wall.  Left ventricular systolic function appears severely depressed. Visually estimated ejection fraction is 25-30%. The LV Doppler derived stroke volume equals 115.0 ccs.     Diastolic indices: E wave velocity 1.4 m/s, E/A ratio 1.7,  msec., E/e' ratio(lat) 24. Diastolic function is indeterminate.     Right Atrium: The right atrium is normal in size, measuring 4.5 cm in length and 3.5 cm in width in the apical view.     Right Ventricle: The right ventricle is normal in size. Global right ventricular systolic function appears normal. Tricuspid annular plane systolic excursion (TAPSE) is 1.2 cm.     Aortic Valve:  The aortic valve is moderately sclerotic with mildly restricted leaflet mobility. The peak velocity obtained across the aortic valve is 2.55 m/s, which translates to a peak gradient of 26 mmHg. The mean gradient is 16 mmHg. Using a left   ventricular outflow tract diameter of 2.1 cm, a left ventricular outflow tract velocity time integral of 33 cm, and a peak instantaneous transvalvular velocity time integral of 71 cm, the calculated aortic valve area is 1.61 cm2(AVAi is 0.74 cm2/m2),   consistent with mild aortic stenosis.     Mitral Valve:  The mitral valve is mildly sclerotic. The pressure half time is 84 msec. The calculated mitral valve area is 2.62 cm2. There is mild mitral regurgitation. There is mitral annular calcification.     Tricuspid Valve:  The tricuspid valve is normal in structure. There is mild tricuspid regurgitation.     Pulmonary Valve:  The pulmonic  valve is not well seen.     IVC: Right atrial pressure as assessed by IVC dynamics is normal at not visualized mmHg.     Intracavitary: There is no evidence of pericardial effusion, intracavity mass, thrombi, or vegetation.         CONCLUSIONS     1 - Severely depressed left ventricular systolic function (EF 25-30%).     2 - Indeterminate LV diastolic function.     3 - Normal right ventricular systolic function .     4 - Mild aortic stenosis, SERA = 1.61 cm2, AVAi = 0.74 cm2/m2, peak velocity = 2.55 m/s, mean gradient = 16 mmHg.     5 - Severe left atrial enlargement.     6 - Concentric hypertrophy.             This document has been electronically    SIGNED BY: Yandel Raman MD On: 05/28/2019 12:41   , EKG: Reviewed and X-Ray: CXR: X-Ray Chest 1 View (CXR): No results found for this visit on 08/03/19. and X-Ray Chest PA and Lateral (CXR): No results found for this visit on 08/03/19.

## 2019-08-06 NOTE — ASSESSMENT & PLAN NOTE
Stage 4  Started DBT this AM  Cont IV diuresis  Cont BB, stop ACE due to SNEHA  Rec palliative care eval    8/5/19  -Some improvement overnight  -Continue IV diuresis  -Continue Coreg  -No ACEi/ARB given worsening renal function    8/6/19  -Feels better this AM, SOB greatly improved  -Will wean dobutamine  -Continue IV Lasix for now  -Continue Coreg

## 2019-08-06 NOTE — PHYSICIAN QUERY
PT Name: Carlitos Ellington  MR #: [id number]     Physician Query Form - Diagnosis Clarification      CDS/: Kylie Wood               Contact information:[email or web address]    This form is a permanent document in the medical record.     Query Date: August 6, 2019    By submitting this query, we are merely seeking further clarification of documentation.  Please utilize your independent clinical judgment when addressing the question(s) below.     The medical record contains the following:      Findings Supporting Clinical Information Location in Medical Record   Cardiology Consulted - On dobutamine GTT - NSTEMI   8/5   Likely Demand Ischemia   NSTEMI POA   Heparin Gtt       Troponin = 0.242, 0.358, 0.50, 0.321      Elevated troponin   Cont hep drip   Likely sec to demand ischemia   May need repeat ischemic workup if none recently but discuss palliative approach with pt and fam    HM PN 8/5            Labs 8/3      Cards CN 8/4     Please clarify if the ___NSTEMI_____ diagnosis has been:    [ x ] Ruled In   [  ] Ruled Out   [  ] Other/Clarification of findings (please specify):     [  ] Clinically undetermined     Please document in your progress notes daily for the duration of treatment, until resolved, and include in your discharge summary.

## 2019-08-06 NOTE — DISCHARGE SUMMARY
[facility] Hospital Medicine  Discharge Summary      Patient Name: Carlitos Ellington  MRN: [id number]  Admission Date: 8/3/2019  Hospital Length of Stay: 2 days  Discharge Date and Time: No discharge date for patient encounter.  Attending Physician: Luis Garibay MD   Discharging Provider: Luis Garibay MD  Primary Care Provider: Raza Uribe MD      HPI:   Carlitos Ellington is a 78 y.o. male patient with a PMHx of CABG, CVA, CAD, CHF, DM, GERD, HTN, HLD, and mild AI who presents to the Emergency Department for evaluation of generalized weakness which onset suddenly when he woke up this morning. Pt denies SOB at this time, but wife states he seems SOB. Symptoms are constant and moderate in severity. No exacerbating factors reported. Associated sxs include fatigue and fever. Pt is on continuous oxygen at 2L NC. Patient denies any CP, abd pain, hematuria, dysuria, blood in stool, difficulty urinating, frequency, n/v/d, leg swelling, palpitations, and all other sxs at this time.  Per EMS pt's O2 sats dropped and was placed on 4L NC. Wife gave pt 2 tylenol x1 hour PTA.  ED work-up resulted h/h of 9.8/29.9, BUN 35, Creatinine 2.8, BNP 1711, troponin 0.242, ABG: PO2 55, HCO3 22.7 on  2L NC. CXR notes pulmonary edema.  Bilateral pleural fluid collections left greater than right with atelectasis or consolidation left lower lobe. Hospital medicine called for admission. Patient is a DNR.     * No surgery found *      Hospital Course:   8/4/19 -  Pt is seen at bedside . Expressed that he would like to see a Pulmonologist while in the hospital . He is O2 dependent at home at 2L/min. Currently requiring high flow O2 via NC. Denies chest pain but report some SOB and subjective fever . Tmax 99.2 since admission.     CT chest done this morning . Report is reviewed .Positive emphysematous changes are suggested . Pt is a former smoker.    Cardiology and Nephrology consults were obtained     8/5  Patient with  continued decline. Pulmonary consulted - Emphysema, Cardiology Consulted - On dobutamine GTT - NSTEMI, Nephrology on Board. Discussed multiple comorbidities and the apprpriatness of Palliative Care. Patient understood and has accepted Hospice Referral. CM at bedside. POA selecting [facility]. Awaiting consult. Patient denies CP + SOB. Plan for Inpatient placement with transition to home as appropriate.   8/6  Patient evaluated by Hospice and has been accepted for Home Hospice per family direction. Patient critical and appropriate for comfort care - seen and examined today prior to his discharge to home.     Consults:   Consults (From admission, onward)        Status Ordering Provider     Inpatient consult to Cardiology  Once     Provider:  Loc Brown MD    Completed MABLE CROSS.     Inpatient consult to Nephrology  Once     Provider:  Raj Acharya MD    Acknowledged MABLE CROSS.     Inpatient consult to Pulmonology  Once     Provider:  Catracho Harris MD    Completed IDA ANG          No new Assessment & Plan notes have been filed under this hospital service since the last note was generated.  Service: Hospital Medicine    Final Active Diagnoses:    Diagnosis Date Noted POA    PRINCIPAL PROBLEM:  Acute on Chronic hypoxic respiratory failure  [R09.02] 08/03/2019 Yes    Comfort measures only status [Z51.5] 08/06/2019 Not Applicable    Atelectasis - bilateral [J98.11] 08/04/2019 Yes    Pleural effusion, bilateral [J90] 08/04/2019 Yes    Panlobular emphysema [J43.1] 08/04/2019 Yes    Acute on chronic combined systolic and diastolic heart failure [I50.43] 08/03/2019 Yes    Elevated troponin [R74.8] 08/03/2019 Yes    CAD, multiple vessel [I25.10] 05/28/2019 Yes    ACC/AHA stage C congestive heart failure due to ischemic cardiomyopathy [I50.9, I25.5] 05/28/2019 Yes    Chronic kidney disease, stage IV (severe) [N18.4]  Yes    Hypertension associated with diabetes [E11.59, I10]   Yes    Hyperlipidemia associated with type 2 diabetes mellitus [E11.69, E78.5]  Yes    Carotid stenosis [I65.29]  Yes      Problems Resolved During this Admission:    Diagnosis Date Noted Date Resolved POA    Pneumonia left lower lobe [J18.9] 08/03/2019 08/05/2019 Yes       Discharged Condition: critical    Disposition: Hospice/Home    Follow Up:  Follow-up Information     [facility].    Specialties:  Hospice and Palliative Medicine, Hospice Services  Contact information:  [id number]  [facility] [id number] [phone]                 Patient Instructions:      Diet Cardiac     Diet diabetic     Activity as tolerated       Significant Diagnostic Studies: Labs:   BMP:   Recent Labs   Lab 08/05/19  0452 08/06/19  0549   * 232*    140   K 3.7 3.7    100   CO2 25 28   BUN 43* 48*   CREATININE 3.4* 3.5*   CALCIUM 9.0 9.2   , CMP   Recent Labs   Lab 08/05/19  0452 08/06/19  0549    140   K 3.7 3.7    100   CO2 25 28   * 232*   BUN 43* 48*   CREATININE 3.4* 3.5*   CALCIUM 9.0 9.2   ANIONGAP 12 12   ESTGFRAFRICA 19* 18*   EGFRNONAA 16* 16*   , CBC   Recent Labs   Lab 08/05/19  0452 08/06/19  0549   WBC 6.28 12.41   HGB 9.4* 9.6*   HCT 28.3* 29.2*    248   , Troponin   Recent Labs   Lab 08/03/19  2203   TROPONINI 0.321*    and All labs within the past 24 hours have been reviewed    Pending Diagnostic Studies:     Procedure Component Value Units Date/Time    APTT [[id number]] Collected:  08/06/19 0907    Order Status:  Sent Lab Status:  In process Updated:  08/06/19 0938    Specimen:  Blood     Narrative:       Collection has been rescheduled by ENS at 08/06/2019 09:00 Reason: Dr   in room Patient unavailable         Medications:  Reconciled Home Medications:      Medication List      CONTINUE taking these medications    ACCU-CHEK MASHA PLUS TEST STRP Strp  Generic drug:  blood sugar diagnostic     amLODIPine 10 MG tablet  Commonly known as:   NORVASC  Take 10 mg by mouth once daily.     aspirin 325 MG tablet  Take 81 mg by mouth once daily.     clopidogrel 75 mg tablet  Commonly known as:  PLAVIX  Take 1 tablet (75 mg total) by mouth once daily.     furosemide 40 MG tablet  Commonly known as:  LASIX  Take 1 tablet (40 mg total) by mouth once daily.     hydrALAZINE 100 MG tablet  Commonly known as:  APRESOLINE  Take 1 tablet (100 mg total) by mouth 2 (two) times daily.     insulin glargine 100 unit/mL injection  Commonly known as:  LANTUS  Inject 35 Units into the skin every evening.     isosorbide dinitrate 20 MG tablet  Commonly known as:  ISORDIL  Take 1 tablet (20 mg total) by mouth 3 (three) times daily.     metoprolol succinate 50 MG 24 hr tablet  Commonly known as:  TOPROL-XL  Take 50 mg by mouth once daily.     omeprazole 20 MG capsule  Commonly known as:  PRILOSEC  Take 20 mg by mouth once daily.     ondansetron 4 MG Tbdl  Commonly known as:  ZOFRAN-ODT  Take 1 tablet (4 mg total) by mouth every 8 (eight) hours as needed (prn nausea/vomiting).     prazosin 2 MG Cap  Commonly known as:  MINIPRESS  Take 1 mg by mouth 2 (two) times daily.     prednisoLONE acetate 1 % Drps  Commonly known as:  PRED FORTE     sertraline 50 MG tablet  Commonly known as:  ZOLOFT  Take 1 tablet (50 mg total) by mouth once daily.        STOP taking these medications    rosuvastatin 40 MG Tab  Commonly known as:  CRESTOR            Indwelling Lines/Drains at time of discharge:   Lines/Drains/Airways          None          Time spent on the discharge of patient: 35 minutes  Patient was seen and examined on the date of discharge and determined to be suitable for discharge.     45 mins cc time    Luis Garibay MD  Department of Hospital Medicine  [facility]

## 2019-08-06 NOTE — PROGRESS NOTES
[facility]  Cardiology  Progress Note    Patient Name: Carlitos Ellington  MRN: [id number]  Admission Date: 8/3/2019  Hospital Length of Stay: 2 days  Code Status: DNR   Attending Physician: Luis Garibay MD   Primary Care Physician: Raza Uribe MD  Expected Discharge Date: 8/6/2019  Principal Problem:Hypoxemia    Subjective:   HPI:  Carlitos Ellington is a 78 y.o. male patient with a PMHx of CABG, CVA, CAD, CHF, DM, GERD, HTN, HLD, and mild AI who presents to the Emergency Department for evaluation of generalized weakness which onset suddenly when he woke up in morning. Info obtained from chart.  Pt denies SOB at this time, but wife states he seems SOB. Symptoms are constant and moderate in severity. No exacerbating factors reported. Associated sxs include fatigue and fever. Pt is on continuous oxygen at 2L NC. Patient denies any CP, abd pain, hematuria, dysuria, blood in stool, difficulty urinating, frequency, n/v/d, leg swelling, palpitations, and all other sxs at this time.  Per EMS pt's O2 sats dropped and was placed on 4L NC. Wife gave pt 2 tylenol x1 hour PTA.  ED work-up resulted h/h of 9.8/29.9, BUN 35, Creatinine 2.8, BNP 1711, troponin 0.242, ABG: PO2 55, HCO3 22.7 on  2L NC. CXR notes pulmonary edema.  Bilateral pleural fluid collections left greater than right with atelectasis or consolidation left lower lobe. Cardiology consult for CHF.    Started pt on DBT 2.5 for diuresis, renal function is worse, trop rising to 0.358, on heparin drip. Need records from primary cardiologist.  Discussed palliation also as he is on DBT now.     Hospital Course:   8/5/19-Patient seen and examined today.  Feels ok. SOB improved. No chest pain. 2D echo showed EF of 25-30%, DD, mild AS. Labs reviewed, creatinine 3.4 Troponin trending down.    8/6/19-Patient seen and examined today. Seems to feel ok. SOB greatly improved. Remains chest pain free. Labs reviewed. Creatinine 3.5. Will wean dobutamine today  and assess response.         Review of Systems   Constitution: Positive for malaise/fatigue.   Cardiovascular: Positive for dyspnea on exertion (improved).   Respiratory: Positive for shortness of breath (improved).    Endocrine: Negative.    Hematologic/Lymphatic: Negative.    Skin: Negative.    Musculoskeletal: Negative.    Gastrointestinal: Negative.    Genitourinary: Negative.    Neurological: Positive for weakness.   Psychiatric/Behavioral: Negative.    Allergic/Immunologic: Negative.      Objective:     Vital Signs (Most Recent):  Temp: 98 °F (36.7 °C) (08/06/19 0829)  Pulse: 86 (08/06/19 0829)  Resp: 16 (08/06/19 0829)  BP: (!) 153/69 (08/06/19 0829)  SpO2: (!) 90 % (08/06/19 0829) Vital Signs (24h Range):  Temp:  [97.6 °F (36.4 °C)-98.2 °F (36.8 °C)] 98 °F (36.7 °C)  Pulse:  [] 86  Resp:  [16-20] 16  SpO2:  [90 %-99 %] 90 %  BP: (135-162)/(63-71) 153/69     Weight: 90.8 kg (200 lb 2.8 oz)  Body mass index is 29.56 kg/m².     SpO2: (!) 90 %  O2 Device (Oxygen Therapy): High Flow nasal Cannula      Intake/Output Summary (Last 24 hours) at 8/6/2019 0948  Last data filed at 8/6/2019 0900  Gross per 24 hour   Intake 788.6 ml   Output 1600 ml   Net -811.4 ml       Lines/Drains/Airways     Peripheral Intravenous Line                 Peripheral IV - Single Lumen 08/03/19 20 G Right Wrist 3 days         Peripheral IV - Single Lumen 08/06/19 0417 20 G Left Forearm less than 1 day                Physical Exam   Constitutional: He is oriented to person, place, and time. He appears well-developed and well-nourished. No distress.   HENT:   Head: Normocephalic and atraumatic.   Eyes: Pupils are equal, round, and reactive to light. Right eye exhibits no discharge. Left eye exhibits no discharge.   Neck: Neck supple. No JVD present.   Cardiovascular: Normal rate, regular rhythm, S1 normal, S2 normal and normal heart sounds.   No murmur heard.  Pulmonary/Chest: Effort normal. No respiratory distress. He has no wheezes.    Mildly diminished BS at bases (improved)     Abdominal: Soft. He exhibits no distension.   Musculoskeletal: He exhibits no edema.   Neurological: He is alert and oriented to person, place, and time.   Skin: Skin is warm and dry. He is not diaphoretic. No erythema.   Psychiatric: He has a normal mood and affect. His behavior is normal. Thought content normal.   Nursing note and vitals reviewed.      Significant Labs:   CMP   Recent Labs   Lab 08/05/19  0452 08/06/19  0549    140   K 3.7 3.7    100   CO2 25 28   * 232*   BUN 43* 48*   CREATININE 3.4* 3.5*   CALCIUM 9.0 9.2   ANIONGAP 12 12   ESTGFRAFRICA 19* 18*   EGFRNONAA 16* 16*   , CBC   Recent Labs   Lab 08/05/19  0452 08/06/19  0549   WBC 6.28 12.41   HGB 9.4* 9.6*   HCT 28.3* 29.2*    248   , Troponin No results for input(s): TROPONINI in the last 48 hours. and All pertinent lab results from the last 24 hours have been reviewed.    Significant Imaging: Echocardiogram:   2D echo with color flow doppler:   Results for orders placed or performed during the hospital encounter of 05/28/19   2D echo with color flow doppler   Result Value Ref Range    QEF 25 (A) 55 - 65    Mitral Valve Regurgitation MILD     Aortic Valve Stenosis MILD (A)     Tricuspid Valve Regurgitation MILD     Narrative    Date of Procedure: 05/28/2019        TEST DESCRIPTION   Technical Quality: This is a technically challenging study. There is poor endocardial definition. This study was performed in conjunction with a 3ml intravenous injection of Optison contrast agent.     Aorta: The aortic root is upper limit of normal, measuring 3.4 cm at sinotubular junction and 3.6 cm at Sinuses of Valsalva. The proximal ascending aorta is normal in size, measuring 3.2 cm across.     Left Atrium: The left atrial volume index is severely enlarged, measuring 59.54 cc/m2.     Left Ventricle: The left ventricle is normal in size, with an end-diastolic diameter of 5.0 cm, and an  end-systolic diameter of 4.4 cm. Wall thickness is increased, with the septum measuring 2.2 cm and the posterior wall measuring 1.6 cm across. Relative   wall thickness was increased at 0.64, and the LV mass index was increased at 260.4 g/m2 consistent with concentric left ventricular hypertrophy. The following segments were moderately hypokinetic: mid inferolateral wall, basal inferolateral wall.  The following segments were severely hypokinetic: mid inferior wall.  Left ventricular systolic function appears severely depressed. Visually estimated ejection fraction is 25-30%. The LV Doppler derived stroke volume equals 115.0 ccs.     Diastolic indices: E wave velocity 1.4 m/s, E/A ratio 1.7,  msec., E/e' ratio(lat) 24. Diastolic function is indeterminate.     Right Atrium: The right atrium is normal in size, measuring 4.5 cm in length and 3.5 cm in width in the apical view.     Right Ventricle: The right ventricle is normal in size. Global right ventricular systolic function appears normal. Tricuspid annular plane systolic excursion (TAPSE) is 1.2 cm.     Aortic Valve:  The aortic valve is moderately sclerotic with mildly restricted leaflet mobility. The peak velocity obtained across the aortic valve is 2.55 m/s, which translates to a peak gradient of 26 mmHg. The mean gradient is 16 mmHg. Using a left   ventricular outflow tract diameter of 2.1 cm, a left ventricular outflow tract velocity time integral of 33 cm, and a peak instantaneous transvalvular velocity time integral of 71 cm, the calculated aortic valve area is 1.61 cm2(AVAi is 0.74 cm2/m2),   consistent with mild aortic stenosis.     Mitral Valve:  The mitral valve is mildly sclerotic. The pressure half time is 84 msec. The calculated mitral valve area is 2.62 cm2. There is mild mitral regurgitation. There is mitral annular calcification.     Tricuspid Valve:  The tricuspid valve is normal in structure. There is mild tricuspid regurgitation.      Pulmonary Valve:  The pulmonic valve is not well seen.     IVC: Right atrial pressure as assessed by IVC dynamics is normal at not visualized mmHg.     Intracavitary: There is no evidence of pericardial effusion, intracavity mass, thrombi, or vegetation.         CONCLUSIONS     1 - Severely depressed left ventricular systolic function (EF 25-30%).     2 - Indeterminate LV diastolic function.     3 - Normal right ventricular systolic function .     4 - Mild aortic stenosis, SERA = 1.61 cm2, AVAi = 0.74 cm2/m2, peak velocity = 2.55 m/s, mean gradient = 16 mmHg.     5 - Severe left atrial enlargement.     6 - Concentric hypertrophy.             This document has been electronically    SIGNED BY: Yandel Raman MD On: 05/28/2019 12:41   , EKG: Reviewed and X-Ray: CXR: X-Ray Chest 1 View (CXR): No results found for this visit on 08/03/19. and X-Ray Chest PA and Lateral (CXR): No results found for this visit on 08/03/19.    Assessment and Plan:   Patient presents with SOB/respiratory failure in setting of decompensated CHF. Improved since admission. Will attempt to wean dobutamine. Continue same meds. Elevated troponin secondary to demand ischemia.    * Acute on Chronic hypoxic respiratory failure   -Sec to CHF  -Continue diuresis     Elevated troponin  Cont hep drip  Likely sec to demand ischemia  May need repeat ischemic workup if none recently but discuss palliative approach with pt and fam    8/5/19  -Stable overnight, no chest pain  -Review of records in care everywhere reportedly shows patient refused LHC in the past due to concerning of contrast associated nephropathy  -Continue ASA, BB, statin  -Can heparanize for 24-48 hours    8/6/19  -Troponin trending down  -Remains chest pain free  -Continue ASA, BB, statin  -Stop heparin gtt    Acute on chronic combined systolic and diastolic heart failure  Stage 4  Started DBT this AM  Cont IV diuresis  Cont BB, stop ACE due to SNEHA  Rec palliative care  eval    8/5/19  -Some improvement overnight  -Continue IV diuresis  -Continue Coreg  -No ACEi/ARB given worsening renal function    8/6/19  -Feels better this AM, SOB greatly improved  -Will wean dobutamine  -Continue IV Lasix for now  -Continue Coreg    CAD, multiple vessel  -Continue ASA, statin, BB    ACC/AHA stage C congestive heart failure due to ischemic cardiomyopathy  -See plan under combined CHF      Chronic kidney disease, stage IV (severe)  -Nephrology on board, appreciate assistance    Hyperlipidemia associated with type 2 diabetes mellitus  -Continue statin    Hypertension associated with diabetes  -Cont meds, reduce afterload and titrate        VTE Risk Mitigation (From admission, onward)        Ordered     heparin 25,000 units in dextrose 5% (100 units/ml) IV bolus from bag - ADDITIONAL PRN BOLUS - 60 units/kg (max bolus 4000 units)  As needed (PRN)      08/04/19 0317     heparin 25,000 units in dextrose 5% (100 units/ml) IV bolus from bag - ADDITIONAL PRN BOLUS - 30 units/kg (max bolus 4000 units)  As needed (PRN)      08/04/19 0317     heparin 25,000 units in dextrose 5% 250 mL (100 units/mL) infusion LOW INTENSITY nomogram - [facility]  Continuous      08/03/19 1455     IP VTE HIGH RISK PATIENT  Once      08/03/19 1403          Margret Rankin PA-C  Cardiology  [facility]     Chart reviewed. Dr. Raman examined patient and agrees with plan as outlined above.

## 2019-08-06 NOTE — ASSESSMENT & PLAN NOTE
8/4 Start jet nebs  8/5 O2 keep sat above 90%.  Nebs p.r.n.  8/6 O2 keep sat above 90% nebs p.r.n..

## 2019-08-06 NOTE — PLAN OF CARE
[facility] visiting patient currently.  All are in agreement for patient to go home on hospice.       08/06/19 1004   Post-Acute Status   Post-Acute Authorization Home Health/Hospice   Post-Acute Placement Status Set-up Complete

## 2019-08-06 NOTE — SUBJECTIVE & OBJECTIVE
Interval History:     8/5/19: patient denies any SOB or pain. He is using urinal for urine collection.     8/6/19: patient without pain, SOB, LE edema.             Review of patient's allergies indicates:   Allergen Reactions    Influenza virus vaccines Other (See Comments)     Red streaks up arm    Isosorbide (solution)      Headache    Penicillins Swelling     Current Facility-Administered Medications   Medication Frequency    albuterol-ipratropium 2.5 mg-0.5 mg/3 mL nebulizer solution 3 mL Q6H    amLODIPine tablet 10 mg Daily    aspirin EC tablet 81 mg Daily    budesonide nebulizer solution 0.5 mg BID    clopidogrel tablet 75 mg Daily    dextrose 50% injection 12.5 g PRN    dextrose 50% injection 25 g PRN    DOBUtamine 1000 mg in D5W 250 mL infusion (premix non-titrating) Continuous    furosemide injection 80 mg TID    glucagon (human recombinant) injection 1 mg PRN    glucose chewable tablet 16 g PRN    glucose chewable tablet 24 g PRN    hydrALAZINE tablet 100 mg BID    insulin aspart U-100 pen 1-10 Units QID (AC + HS) PRN    insulin detemir U-100 pen 20 Units BID    isosorbide dinitrate tablet 20 mg TID    metoprolol succinate (TOPROL-XL) 24 hr tablet 50 mg Daily    pantoprazole EC tablet 40 mg Daily    prazosin capsule 1 mg BID    ramelteon tablet 8 mg Nightly PRN    rosuvastatin tablet 40 mg QHS    sertraline tablet 50 mg Daily       Objective:     Vital Signs (Most Recent):  Temp: 98.3 °F (36.8 °C) (08/06/19 1107)  Pulse: 77 (08/06/19 1107)  Resp: 17 (08/06/19 1107)  BP: 137/65 (08/06/19 1107)  SpO2: 97 % (08/06/19 1107)  O2 Device (Oxygen Therapy): High Flow nasal Cannula (08/06/19 0810) Vital Signs (24h Range):  Temp:  [97.6 °F (36.4 °C)-98.3 °F (36.8 °C)] 98.3 °F (36.8 °C)  Pulse:  [] 77  Resp:  [16-20] 17  SpO2:  [90 %-99 %] 97 %  BP: (135-162)/(63-71) 137/65     Weight: 90.8 kg (200 lb 2.8 oz) (08/06/19 0600)  Body mass index is 29.56 kg/m².  Body surface area is 2.1  meters squared.    I/O last 3 completed shifts:  In: 924.3 [P.O.:320; I.V.:604.3]  Out: 2975 [Urine:2975]    Physical Exam   Constitutional: He is oriented to person, place, and time. He appears well-developed. He is cooperative.   HENT:   Head: Normocephalic.   Nose: No rhinorrhea.   Mouth/Throat: Mucous membranes are normal. No oropharyngeal exudate.   Eyes: Pupils are equal, round, and reactive to light.   Neck: No thyroid mass present.   Cardiovascular: Normal rate, regular rhythm, S1 normal, S2 normal and intact distal pulses.   Pulmonary/Chest: Effort normal. No respiratory distress. He has no wheezes.   Abdominal: Soft. Bowel sounds are normal. He exhibits no distension. There is no tenderness. No hernia.   Lymphadenopathy:     He has no cervical adenopathy.   Neurological: He is alert and oriented to person, place, and time.   Skin: Skin is warm and dry.       Significant Labs:  Lab Results   Component Value Date    CREATININE 3.5 (H) 08/06/2019    BUN 48 (H) 08/06/2019     08/06/2019    K 3.7 08/06/2019     08/06/2019    CO2 28 08/06/2019     Lab Results   Component Value Date    CALCIUM 9.2 08/06/2019    PHOS 3.2 08/03/2019     Lab Results   Component Value Date    ALBUMIN 2.7 (L) 08/03/2019     Lab Results   Component Value Date    WBC 12.41 08/06/2019    HGB 9.6 (L) 08/06/2019    HCT 29.2 (L) 08/06/2019    MCV 90 08/06/2019     08/06/2019     Recent Labs   Lab 08/03/19  1636   MG 1.8     Recent Labs   Lab 08/03/19  2203   TROPONINI 0.321*         Significant Imaging:  Imaging Results          X-Ray Chest AP Portable (Final result)  Result time 08/03/19 11:57:16    Final result by Morales Walsh MD (08/03/19 11:57:16)                 Impression:      Cardiomegaly with pulmonary edema.  Bilateral pleural fluid collections left greater than right with atelectasis or consolidation left lower lobe.      Electronically signed by: Morales Walsh  Date:    08/03/2019  Time:    11:57              Narrative:    EXAMINATION:  XR CHEST AP PORTABLE    CLINICAL HISTORY:  fatigue;    COMPARISON:  05/28/2019    FINDINGS:  Cardiomegaly with by lateral pulmonary congestion.  There is some consolidation atelectatic change in the left lung base with bilateral pleural fluid collections.

## 2019-08-06 NOTE — PROGRESS NOTES
[facility] -   Nephrology  Progress Note    Patient Name: Carlitos Ellington  MRN: [id number]  Admission Date: 8/3/2019  Hospital Length of Stay: 2 days  Attending Provider: Luis Garibay MD   Primary Care Physician: Raza Uribe MD  Principal Problem:Hypoxemia    Subjective:     HPI: 78 year old male with AS, CKD 4, CHF, CAD, DM2, GERD, h/o CVA [place] [facility] [place]. Wor-up revealed ADHF, pneumonia, elevated troponin and SNEHA on CKD 4.     Nephrology was consulted to help with patient's renal care while he is [place] [facility]. I saw and examined patient in his hospital room. Patient reports that he woke up with fever on of admission. He also reports generalized weakness and chronic SOB that is exacerbated by ambulation/exertion. He denies any chest pain, abdominal pain, nausea/vomiting, diarrhea, LE edema, dysuria. He also denies NSAID use, has been using tylenol only. He uses Lasix at home (1 pill per day).     Chart review revealed that patient's baseline creatinine is about 2.5 to 2.7. Patient is currently not seen by nephrology.     Interval History:     8/5/19: patient denies any SOB or pain. He is using urinal for urine collection.     8/6/19: patient without pain, SOB, LE edema.             Review of patient's allergies indicates:   Allergen Reactions    Influenza virus vaccines Other (See Comments)     Red streaks up arm    Isosorbide (solution)      Headache    Penicillins Swelling     Current Facility-Administered Medications   Medication Frequency    albuterol-ipratropium 2.5 mg-0.5 mg/3 mL nebulizer solution 3 mL Q6H    amLODIPine tablet 10 mg Daily    aspirin EC tablet 81 mg Daily    budesonide nebulizer solution 0.5 mg BID    clopidogrel tablet 75 mg Daily    dextrose 50% injection 12.5 g PRN    dextrose 50% injection 25 g PRN    DOBUtamine 1000 mg in D5W 250 mL infusion (premix non-titrating) Continuous    furosemide injection 80 mg TID    glucagon (human  recombinant) injection 1 mg PRN    glucose chewable tablet 16 g PRN    glucose chewable tablet 24 g PRN    hydrALAZINE tablet 100 mg BID    insulin aspart U-100 pen 1-10 Units QID (AC + HS) PRN    insulin detemir U-100 pen 20 Units BID    isosorbide dinitrate tablet 20 mg TID    metoprolol succinate (TOPROL-XL) 24 hr tablet 50 mg Daily    pantoprazole EC tablet 40 mg Daily    prazosin capsule 1 mg BID    ramelteon tablet 8 mg Nightly PRN    rosuvastatin tablet 40 mg QHS    sertraline tablet 50 mg Daily       Objective:     Vital Signs (Most Recent):  Temp: 98.3 °F (36.8 °C) (08/06/19 1107)  Pulse: 77 (08/06/19 1107)  Resp: 17 (08/06/19 1107)  BP: 137/65 (08/06/19 1107)  SpO2: 97 % (08/06/19 1107)  O2 Device (Oxygen Therapy): High Flow nasal Cannula (08/06/19 0810) Vital Signs (24h Range):  Temp:  [97.6 °F (36.4 °C)-98.3 °F (36.8 °C)] 98.3 °F (36.8 °C)  Pulse:  [] 77  Resp:  [16-20] 17  SpO2:  [90 %-99 %] 97 %  BP: (135-162)/(63-71) 137/65     Weight: 90.8 kg (200 lb 2.8 oz) (08/06/19 0600)  Body mass index is 29.56 kg/m².  Body surface area is 2.1 meters squared.    I/O last 3 completed shifts:  In: 924.3 [P.O.:320; I.V.:604.3]  Out: 2975 [Urine:2975]    Physical Exam   Constitutional: He is oriented to person, place, and time. He appears well-developed. He is cooperative.   HENT:   Head: Normocephalic.   Nose: No rhinorrhea.   Mouth/Throat: Mucous membranes are normal. No oropharyngeal exudate.   Eyes: Pupils are equal, round, and reactive to light.   Neck: No thyroid mass present.   Cardiovascular: Normal rate, regular rhythm, S1 normal, S2 normal and intact distal pulses.   Pulmonary/Chest: Effort normal. No respiratory distress. He has no wheezes.   Abdominal: Soft. Bowel sounds are normal. He exhibits no distension. There is no tenderness. No hernia.   Lymphadenopathy:     He has no cervical adenopathy.   Neurological: He is alert and oriented to person, place, and time.   Skin: Skin is warm  and dry.       Significant Labs:  Lab Results   Component Value Date    CREATININE 3.5 (H) 08/06/2019    BUN 48 (H) 08/06/2019     08/06/2019    K 3.7 08/06/2019     08/06/2019    CO2 28 08/06/2019     Lab Results   Component Value Date    CALCIUM 9.2 08/06/2019    PHOS 3.2 08/03/2019     Lab Results   Component Value Date    ALBUMIN 2.7 (L) 08/03/2019     Lab Results   Component Value Date    WBC 12.41 08/06/2019    HGB 9.6 (L) 08/06/2019    HCT 29.2 (L) 08/06/2019    MCV 90 08/06/2019     08/06/2019     Recent Labs   Lab 08/03/19  1636   MG 1.8     Recent Labs   Lab 08/03/19  2203   TROPONINI 0.321*         Significant Imaging:  Imaging Results          X-Ray Chest AP Portable (Final result)  Result time 08/03/19 11:57:16    Final result by Morales Walsh MD (08/03/19 11:57:16)                 Impression:      Cardiomegaly with pulmonary edema.  Bilateral pleural fluid collections left greater than right with atelectasis or consolidation left lower lobe.      Electronically signed by: Morales Walsh  Date:    08/03/2019  Time:    11:57             Narrative:    EXAMINATION:  XR CHEST AP PORTABLE    CLINICAL HISTORY:  fatigue;    COMPARISON:  05/28/2019    FINDINGS:  Cardiomegaly with by lateral pulmonary congestion.  There is some consolidation atelectatic change in the left lung base with bilateral pleural fluid collections.                                  Assessment/Plan:     Elevated troponin  As per Cardiology.    Acute on chronic combined systolic and diastolic heart failure  Continue Lasix diuresis.     Chronic kidney disease, stage IV (severe)  Patient has h/o CKD 4 with baseline creatinine of about 2.5 to 2.7. Creatinine has now increased to 3.5 on 8/5/19. SNEHA likely multifactorial and related to infection (pneumonia) and mild ADHF (pulmonary edema on CXR). Will treat pneumonia and continue Lasix dose to 80 mg IV tid. UOP of about 1.8 liters over past 24 hours.   No need for dialysis at  present. Urinalysis shows chronic proteinuria, no hematuria or increased WBC. Renal US shoes no signs of obstruction. Avoid NSAIDs, ARB, ACE-I.         Thank you for your consult. I will follow-up with patient. Please contact us if you have any additional questions.    Raj Acharya MD  Nephrology  [facility]

## 2019-08-06 NOTE — SUBJECTIVE & OBJECTIVE
Interval History:   Review of Systems   Constitutional: Positive for malaise/fatigue.   HENT: Negative for nosebleeds.    Eyes: Negative for discharge and redness.   Cardiovascular: Positive for leg swelling.   Gastrointestinal: Negative for abdominal pain.   Genitourinary: Negative for hematuria.   Musculoskeletal: Positive for back pain and joint pain.   Skin: Negative for rash.   Neurological: Negative for seizures.   Endo/Heme/Allergies: Bruises/bleeds easily.   Psychiatric/Behavioral: Negative for substance abuse.         Objective:     Vital Signs (Most Recent):  Temp: 98.3 °F (36.8 °C) (08/06/19 1107)  Pulse: 74 (08/06/19 1329)  Resp: 18 (08/06/19 1300)  BP: 137/65 (08/06/19 1107)  SpO2: 99 % (08/06/19 1300) Vital Signs (24h Range):  Temp:  [97.6 °F (36.4 °C)-98.3 °F (36.8 °C)] 98.3 °F (36.8 °C)  Pulse:  [73-96] 74  Resp:  [16-20] 18  SpO2:  [90 %-99 %] 99 %  BP: (135-162)/(63-71) 137/65     Weight: 90.8 kg (200 lb 2.8 oz)  Body mass index is 29.56 kg/m².      Intake/Output Summary (Last 24 hours) at 8/6/2019 1426  Last data filed at 8/6/2019 1300  Gross per 24 hour   Intake 1457.41 ml   Output 2800 ml   Net -1342.59 ml       Physical Exam   Constitutional: He is oriented to person, place, and time. He appears well-developed and well-nourished. No distress.   HENT:   Head: Normocephalic and atraumatic.   Eyes: EOM are normal.   Neck: Neck supple.   Cardiovascular: Normal rate.   Murmur heard.  Pulmonary/Chest: Effort normal. No respiratory distress. He has rales.   Abdominal: Soft. There is no tenderness.   Musculoskeletal: He exhibits edema.   Neurological: He is alert and oriented to person, place, and time.   Skin: Rash noted.   Psychiatric: He has a normal mood and affect.   Nursing note and vitals reviewed.      Vents:  Oxygen Concentration (%): 40 (08/06/19 1300)    Lines/Drains/Airways          None          Significant Labs:    CBC/Anemia Profile:  Recent Labs   Lab 08/05/19  0452 08/06/19  0549   WBC  6.28 12.41   HGB 9.4* 9.6*   HCT 28.3* 29.2*    248   MCV 89 90   RDW 14.7* 15.0*        Chemistries:  Recent Labs   Lab 08/05/19  0452 08/06/19  0549    140   K 3.7 3.7    100   CO2 25 28   BUN 43* 48*   CREATININE 3.4* 3.5*   CALCIUM 9.0 9.2     Significant Imaging:  CT chest   Emphysematous changes with questionable pulmonary congestion.  Bibasilar atelectatic change with bilateral pleural fluid collections.  Suspect small loculated component of pleural fluid along the left lateral chest wall.  8 mm pulmonary nodule right upper lobe      V/Q scan August 3, 2019 negative for pulmonary embolism     2D echo August 3, 2019  Emphysematous changes with questionable pulmonary congestion.  Bibasilar atelectatic change with bilateral pleural fluid collections.  Suspect small loculated component of pleural fluid along the left lateral chest wall.  8 mm pulmonary nodule right upper lobe

## 2019-08-06 NOTE — NURSING
Patien'ts IV taken out. Heart monitor off and returned to tech.   Discharge instructions given and reviewed per float nurse.   Oxygen delivered to bedside.  Patient discharged with hospice.   Wife/transportation at bedside.    Sada Andrea RN

## 2019-08-07 NOTE — PLAN OF CARE
08/07/19 0714   Final Note   Assessment Type Final Discharge Note   [facility]   Right Care Referral Info   Post Acute Recommendation Other   Facility Name [place], [place]

## 2019-08-07 NOTE — PHYSICIAN QUERY
PT Name: Carlitos Ellington  MR #: [id number]     Physician Query Form - Documentation Clarification      CDS/: Kylie Wood               Contact information:[email or web address]    This form is a permanent document in the medical record.     Query Date: August 7, 2019    By submitting this query, we are merely seeking further clarification of documentation. Please utilize your independent clinical judgment when addressing the question(s) below.    The Medical record reflects the following:    Supporting Clinical Findings Location in Medical Record   Cardiology Consulted - On dobutamine GTT - NSTEMI    8/5   Likely Demand Ischemia   NSTEMI POA   Heparin Gtt       Acute on chronic combined systolic   and diastolic heart failure    No acute EKG changes    Elevated troponin likely due to demand ischemia/renal failure and CHF      HM PN 8/5              H&P 8/3     Troponin = Troponin = 0.242, 0.358, 0.321    Elevated troponin   Cont hep drip   Likely sec to demand ischemia   May need repeat ischemic workup if none recently but discuss palliative approach with pt and fam     NSTEMI - ruled in   Labs 8/3      Cards CN 8/4              Physician Query 8/6                                                                            Doctor, Please clarify type of NSTEMI associated with above clinical findings.    Provider Use Only      [  x ]  NSTEMI 2/2 demand ischemia    [   ]  NSTEMI type 1    [   ]  NSTEMI, unspecified    [   ]  Other explanations with details___________________________________                                                                                                               [  ] Clinically Undetermined

## 2019-08-08 LAB
BACTERIA BLD CULT: NORMAL
BACTERIA BLD CULT: NORMAL

## 2021-03-10 ENCOUNTER — PATIENT OUTREACH (OUTPATIENT)
Dept: ADMINISTRATIVE | Facility: HOSPITAL | Age: 81
End: 2021-03-10

## 2023-05-09 NOTE — ED NOTES
5/9/2023        RE: Carlie Hodgkins. [employer]          To Whom It May Concern,      Terrance Flowers may return to work 5/9/2023. Please reach out with any concerns if necessary.      Thanks,   Brendon Toledo., RN Patient reports he was feeling too weak to stand up this morning, and called 911. Patient's wife reports increased falls over the past week, and diarrhea starting last night.    Patient moved to ED room 14, patient assisted onto stretcher and changed into a gown. Patient placed on cardiac monitor, continuous pulse oximetry and automatic blood pressure cuff. Bed placed in low locked position, side rails up x 2, call light is within reach of patient or family, orientation to room and explanation of wait provided to family and patient, alarms set and turned on for monitor and pulse ox, awaiting MD evaluation and orders, will continue to monitor.    Patient identifies self as Carlitos E Chandana.      LOC: The patient is awake, alert and aware of environment with an appropriate affect, the patient is oriented x 3 and speaking appropriately.  APPEARANCE: Patient resting comfortably and in no acute distress, patient is clean and well groomed, patient's clothing is properly fastened.  SKIN: The skin is warm and dry, color consistent with ethnicity, patient has normal skin turgor and moist mucus membranes, skin intact, no breakdown or bruising noted.  MUSCULOSKELETAL: Patient moving all extremities well, no obvious swelling or deformities noted.  RESPIRATORY: Airway is open and patent, respirations are spontaneous, patient has a normal effort and rate, no accessory muscle use noted.  CARDIAC: Patient has a normal rate and rhythm, no periphreal edema noted, capillary refill < 3 seconds.  ABDOMEN: Soft and non tender to palpation, no distention noted.  NEUROLOGIC: PERRL, 3 mm bilaterally, eyes open spontaneously, behavior appropriate to situation, follows commands, facial expression symmetrical, bilateral hand grasp equal and even, purposeful motor response noted, normal sensation in all extremities when touched with a finger.
